# Patient Record
Sex: MALE | Race: WHITE | NOT HISPANIC OR LATINO | Employment: PART TIME | URBAN - METROPOLITAN AREA
[De-identification: names, ages, dates, MRNs, and addresses within clinical notes are randomized per-mention and may not be internally consistent; named-entity substitution may affect disease eponyms.]

---

## 2017-01-28 ENCOUNTER — APPOINTMENT (EMERGENCY)
Dept: RADIOLOGY | Facility: HOSPITAL | Age: 22
End: 2017-01-28
Payer: COMMERCIAL

## 2017-01-28 ENCOUNTER — GENERIC CONVERSION - ENCOUNTER (OUTPATIENT)
Dept: OTHER | Facility: OTHER | Age: 22
End: 2017-01-28

## 2017-01-28 ENCOUNTER — HOSPITAL ENCOUNTER (EMERGENCY)
Facility: HOSPITAL | Age: 22
Discharge: HOME | End: 2017-01-28
Attending: EMERGENCY MEDICINE
Payer: COMMERCIAL

## 2017-01-28 VITALS
HEART RATE: 78 BPM | WEIGHT: 129.8 LBS | SYSTOLIC BLOOD PRESSURE: 122 MMHG | RESPIRATION RATE: 18 BRPM | DIASTOLIC BLOOD PRESSURE: 64 MMHG | OXYGEN SATURATION: 100 % | TEMPERATURE: 98.1 F

## 2017-01-28 DIAGNOSIS — R00.2 INTERMITTENT PALPITATIONS: ICD-10-CM

## 2017-01-28 DIAGNOSIS — R00.2 HEART PALPITATIONS: Primary | ICD-10-CM

## 2017-01-28 LAB
ANION GAP SERPL CALCULATED.3IONS-SCNC: 8 MMOL/L (ref 4–13)
APTT PPP: 26 SECONDS (ref 24–36)
ATRIAL RATE: 118 BPM
BASOPHILS # BLD AUTO: 0.01 THOUSANDS/ΜL (ref 0–0.1)
BASOPHILS NFR BLD AUTO: 0 % (ref 0–1)
BUN SERPL-MCNC: 21 MG/DL (ref 5–25)
CALCIUM SERPL-MCNC: 9.2 MG/DL (ref 8.3–10.1)
CHLORIDE SERPL-SCNC: 106 MMOL/L (ref 100–108)
CO2 SERPL-SCNC: 28 MMOL/L (ref 21–32)
CREAT SERPL-MCNC: 1.11 MG/DL (ref 0.6–1.3)
DEPRECATED D DIMER PPP: <220 NG/ML (FEU) (ref 0–424)
EOSINOPHIL # BLD AUTO: 0.03 THOUSAND/ΜL (ref 0–0.61)
EOSINOPHIL NFR BLD AUTO: 1 % (ref 0–6)
ERYTHROCYTE [DISTWIDTH] IN BLOOD BY AUTOMATED COUNT: 13 % (ref 11.6–15.1)
GFR SERPL CREATININE-BSD FRML MDRD: >60 ML/MIN/1.73SQ M
GLUCOSE SERPL-MCNC: 125 MG/DL (ref 65–140)
HCT VFR BLD AUTO: 45.3 % (ref 36.5–49.3)
HGB BLD-MCNC: 15.9 G/DL (ref 12–17)
HOLD SPECIMEN: NORMAL
INR PPP: 0.96 (ref 0.86–1.16)
LYMPHOCYTES # BLD AUTO: 1.16 THOUSANDS/ΜL (ref 0.6–4.47)
LYMPHOCYTES NFR BLD AUTO: 28 % (ref 14–44)
MAGNESIUM SERPL-MCNC: 2.1 MG/DL (ref 1.6–2.6)
MCH RBC QN AUTO: 31 PG (ref 26.8–34.3)
MCHC RBC AUTO-ENTMCNC: 35.1 G/DL (ref 31.4–37.4)
MCV RBC AUTO: 88 FL (ref 82–98)
MONOCYTES # BLD AUTO: 0.33 THOUSAND/ΜL (ref 0.17–1.22)
MONOCYTES NFR BLD AUTO: 8 % (ref 4–12)
NEUTROPHILS # BLD AUTO: 2.57 THOUSANDS/ΜL (ref 1.85–7.62)
NEUTS SEG NFR BLD AUTO: 63 % (ref 43–75)
NRBC BLD AUTO-RTO: 0 /100 WBCS
P AXIS: 71 DEGREES
PLATELET # BLD AUTO: 156 THOUSANDS/UL (ref 149–390)
PMV BLD AUTO: 10.3 FL (ref 8.9–12.7)
POTASSIUM SERPL-SCNC: 3.9 MMOL/L (ref 3.5–5.3)
PR INTERVAL: 150 MS
PROTHROMBIN TIME: 12.9 SECONDS (ref 12–14.3)
QRS AXIS: 104 DEGREES
QRSD INTERVAL: 80 MS
QT INTERVAL: 296 MS
QTC INTERVAL: 414 MS
RBC # BLD AUTO: 5.13 MILLION/UL (ref 3.88–5.62)
SODIUM SERPL-SCNC: 142 MMOL/L (ref 136–145)
SPECIMEN SOURCE: NORMAL
T WAVE AXIS: 67 DEGREES
TROPONIN I BLD-MCNC: 0 NG/ML (ref 0–0.08)
TSH SERPL DL<=0.05 MIU/L-ACNC: 1.92 UIU/ML (ref 0.36–3.74)
VENTRICULAR RATE: 118 BPM
WBC # BLD AUTO: 4.1 THOUSAND/UL (ref 4.31–10.16)

## 2017-01-28 PROCEDURE — 85610 PROTHROMBIN TIME: CPT | Performed by: EMERGENCY MEDICINE

## 2017-01-28 PROCEDURE — 71020 HB CHEST X-RAY 2VW FRONTAL&LATL: CPT

## 2017-01-28 PROCEDURE — 83735 ASSAY OF MAGNESIUM: CPT | Performed by: EMERGENCY MEDICINE

## 2017-01-28 PROCEDURE — 93005 ELECTROCARDIOGRAM TRACING: CPT | Performed by: EMERGENCY MEDICINE

## 2017-01-28 PROCEDURE — 85025 COMPLETE CBC W/AUTO DIFF WBC: CPT | Performed by: EMERGENCY MEDICINE

## 2017-01-28 PROCEDURE — 84484 ASSAY OF TROPONIN QUANT: CPT

## 2017-01-28 PROCEDURE — 36415 COLL VENOUS BLD VENIPUNCTURE: CPT | Performed by: EMERGENCY MEDICINE

## 2017-01-28 PROCEDURE — 80048 BASIC METABOLIC PNL TOTAL CA: CPT | Performed by: EMERGENCY MEDICINE

## 2017-01-28 PROCEDURE — 85379 FIBRIN DEGRADATION QUANT: CPT | Performed by: EMERGENCY MEDICINE

## 2017-01-28 PROCEDURE — 84443 ASSAY THYROID STIM HORMONE: CPT | Performed by: EMERGENCY MEDICINE

## 2017-01-28 PROCEDURE — 96361 HYDRATE IV INFUSION ADD-ON: CPT

## 2017-01-28 PROCEDURE — 99285 EMERGENCY DEPT VISIT HI MDM: CPT

## 2017-01-28 PROCEDURE — 85730 THROMBOPLASTIN TIME PARTIAL: CPT | Performed by: EMERGENCY MEDICINE

## 2017-01-28 PROCEDURE — 96360 HYDRATION IV INFUSION INIT: CPT

## 2017-01-28 RX ADMIN — SODIUM CHLORIDE 1000 ML: 0.9 INJECTION, SOLUTION INTRAVENOUS at 10:33

## 2017-02-13 ENCOUNTER — ALLSCRIPTS OFFICE VISIT (OUTPATIENT)
Dept: OTHER | Facility: OTHER | Age: 22
End: 2017-02-13

## 2017-02-13 DIAGNOSIS — R00.2 PALPITATIONS: ICD-10-CM

## 2017-02-13 DIAGNOSIS — R94.01 ABNORMAL ELECTROENCEPHALOGRAM: ICD-10-CM

## 2017-02-13 DIAGNOSIS — M54.2 CERVICALGIA: ICD-10-CM

## 2017-02-16 ENCOUNTER — TRANSCRIBE ORDERS (OUTPATIENT)
Dept: ADMINISTRATIVE | Facility: HOSPITAL | Age: 22
End: 2017-02-16

## 2017-02-16 ENCOUNTER — APPOINTMENT (OUTPATIENT)
Dept: LAB | Facility: HOSPITAL | Age: 22
End: 2017-02-16
Attending: INTERNAL MEDICINE
Payer: COMMERCIAL

## 2017-02-16 DIAGNOSIS — R00.2 PALPITATIONS: ICD-10-CM

## 2017-02-16 DIAGNOSIS — M54.2 CERVICALGIA: ICD-10-CM

## 2017-02-16 LAB
T4 FREE SERPL-MCNC: 0.91 NG/DL (ref 0.76–1.46)
TSH SERPL DL<=0.05 MIU/L-ACNC: 1.61 UIU/ML (ref 0.36–3.74)

## 2017-02-16 PROCEDURE — 84443 ASSAY THYROID STIM HORMONE: CPT

## 2017-02-16 PROCEDURE — 36415 COLL VENOUS BLD VENIPUNCTURE: CPT

## 2017-02-16 PROCEDURE — 84439 ASSAY OF FREE THYROXINE: CPT

## 2017-02-16 PROCEDURE — 84479 ASSAY OF THYROID (T3 OR T4): CPT

## 2017-02-17 LAB — T3RU NFR SERPL: 31 % (ref 24–39)

## 2017-02-22 ENCOUNTER — GENERIC CONVERSION - ENCOUNTER (OUTPATIENT)
Dept: OTHER | Facility: OTHER | Age: 22
End: 2017-02-22

## 2017-02-24 ENCOUNTER — HOSPITAL ENCOUNTER (OUTPATIENT)
Dept: NON INVASIVE DIAGNOSTICS | Facility: HOSPITAL | Age: 22
Discharge: HOME | End: 2017-02-24
Attending: INTERNAL MEDICINE
Payer: COMMERCIAL

## 2017-02-24 ENCOUNTER — HOSPITAL ENCOUNTER (OUTPATIENT)
Dept: ULTRASOUND IMAGING | Facility: HOSPITAL | Age: 22
Discharge: HOME | End: 2017-02-24
Attending: INTERNAL MEDICINE
Payer: COMMERCIAL

## 2017-02-24 DIAGNOSIS — R00.2 PALPITATIONS: ICD-10-CM

## 2017-02-24 DIAGNOSIS — M54.2 CERVICALGIA: ICD-10-CM

## 2017-02-24 PROCEDURE — 93306 TTE W/DOPPLER COMPLETE: CPT

## 2017-02-24 PROCEDURE — 76536 US EXAM OF HEAD AND NECK: CPT

## 2017-02-24 PROCEDURE — 93225 XTRNL ECG REC<48 HRS REC: CPT

## 2017-02-24 PROCEDURE — 93226 XTRNL ECG REC<48 HR SCAN A/R: CPT

## 2017-02-27 ENCOUNTER — GENERIC CONVERSION - ENCOUNTER (OUTPATIENT)
Dept: OTHER | Facility: OTHER | Age: 22
End: 2017-02-27

## 2017-03-09 ENCOUNTER — ALLSCRIPTS OFFICE VISIT (OUTPATIENT)
Dept: OTHER | Facility: OTHER | Age: 22
End: 2017-03-09

## 2017-03-15 ENCOUNTER — TRANSCRIBE ORDERS (OUTPATIENT)
Dept: ADMINISTRATIVE | Facility: HOSPITAL | Age: 22
End: 2017-03-15

## 2017-03-15 ENCOUNTER — ALLSCRIPTS OFFICE VISIT (OUTPATIENT)
Dept: OTHER | Facility: OTHER | Age: 22
End: 2017-03-15

## 2017-03-27 ENCOUNTER — HOSPITAL ENCOUNTER (OUTPATIENT)
Dept: NON INVASIVE DIAGNOSTICS | Facility: CLINIC | Age: 22
Discharge: HOME | End: 2017-03-27
Payer: COMMERCIAL

## 2017-03-27 DIAGNOSIS — R94.01 ABNORMAL ELECTROENCEPHALOGRAM: ICD-10-CM

## 2017-03-27 PROCEDURE — 93017 CV STRESS TEST TRACING ONLY: CPT

## 2017-03-28 ENCOUNTER — HOSPITAL ENCOUNTER (OUTPATIENT)
Dept: RADIOLOGY | Facility: HOSPITAL | Age: 22
Discharge: HOME | End: 2017-03-28
Attending: INTERNAL MEDICINE
Payer: COMMERCIAL

## 2017-03-28 ENCOUNTER — TRANSCRIBE ORDERS (OUTPATIENT)
Dept: ADMINISTRATIVE | Facility: HOSPITAL | Age: 22
End: 2017-03-28

## 2017-03-28 ENCOUNTER — ALLSCRIPTS OFFICE VISIT (OUTPATIENT)
Dept: OTHER | Facility: OTHER | Age: 22
End: 2017-03-28

## 2017-03-28 DIAGNOSIS — M54.2 CERVICALGIA: ICD-10-CM

## 2017-03-28 DIAGNOSIS — S06.0X9A CONCUSSION WITH LOSS OF CONSCIOUSNESS: ICD-10-CM

## 2017-03-28 PROCEDURE — 70360 X-RAY EXAM OF NECK: CPT

## 2017-03-28 PROCEDURE — 70110 X-RAY EXAM OF JAW 4/> VIEWS: CPT

## 2017-03-28 PROCEDURE — 72052 X-RAY EXAM NECK SPINE 6/>VWS: CPT

## 2017-03-29 ENCOUNTER — GENERIC CONVERSION - ENCOUNTER (OUTPATIENT)
Dept: OTHER | Facility: OTHER | Age: 22
End: 2017-03-29

## 2017-03-30 LAB
CHEST PAIN STATEMENT: NORMAL
MAX DIASTOLIC BP: 62 MMHG
MAX HEART RATE: 193 BPM
MAX PREDICTED HEART RATE: 198 BPM
MAX. SYSTOLIC BP: 141 MMHG
PROTOCOL NAME: NORMAL
REASON FOR TERMINATION: NORMAL
TARGET HR FORMULA: NORMAL
TEST INDICATION: NORMAL
TIME IN EXERCISE PHASE: 950 S

## 2017-04-04 ENCOUNTER — HOSPITAL ENCOUNTER (OUTPATIENT)
Dept: CT IMAGING | Facility: HOSPITAL | Age: 22
Discharge: HOME | End: 2017-04-04
Attending: INTERNAL MEDICINE
Payer: COMMERCIAL

## 2017-04-04 DIAGNOSIS — S06.0X9A CONCUSSION WITH LOSS OF CONSCIOUSNESS: ICD-10-CM

## 2017-04-04 PROCEDURE — 70450 CT HEAD/BRAIN W/O DYE: CPT

## 2017-04-05 ENCOUNTER — GENERIC CONVERSION - ENCOUNTER (OUTPATIENT)
Dept: OTHER | Facility: OTHER | Age: 22
End: 2017-04-05

## 2017-05-02 DIAGNOSIS — S06.0X9A CONCUSSION WITH LOSS OF CONSCIOUSNESS: ICD-10-CM

## 2017-05-03 ENCOUNTER — APPOINTMENT (OUTPATIENT)
Dept: PHYSICAL THERAPY | Facility: CLINIC | Age: 22
End: 2017-05-03
Payer: COMMERCIAL

## 2017-05-03 DIAGNOSIS — S06.0X9A CONCUSSION WITH LOSS OF CONSCIOUSNESS: ICD-10-CM

## 2017-05-03 PROCEDURE — 97162 PT EVAL MOD COMPLEX 30 MIN: CPT

## 2017-05-08 ENCOUNTER — APPOINTMENT (OUTPATIENT)
Dept: PHYSICAL THERAPY | Facility: CLINIC | Age: 22
End: 2017-05-08
Payer: COMMERCIAL

## 2017-05-08 PROCEDURE — 97014 ELECTRIC STIMULATION THERAPY: CPT

## 2017-05-08 PROCEDURE — 97140 MANUAL THERAPY 1/> REGIONS: CPT

## 2017-05-08 PROCEDURE — 97110 THERAPEUTIC EXERCISES: CPT

## 2017-05-11 ENCOUNTER — APPOINTMENT (OUTPATIENT)
Dept: PHYSICAL THERAPY | Facility: CLINIC | Age: 22
End: 2017-05-11
Payer: COMMERCIAL

## 2017-05-11 PROCEDURE — 97110 THERAPEUTIC EXERCISES: CPT

## 2017-05-11 PROCEDURE — 97140 MANUAL THERAPY 1/> REGIONS: CPT

## 2017-05-11 PROCEDURE — 97014 ELECTRIC STIMULATION THERAPY: CPT

## 2017-05-15 ENCOUNTER — APPOINTMENT (OUTPATIENT)
Dept: PHYSICAL THERAPY | Facility: CLINIC | Age: 22
End: 2017-05-15
Payer: COMMERCIAL

## 2017-05-15 PROCEDURE — 97140 MANUAL THERAPY 1/> REGIONS: CPT

## 2017-05-15 PROCEDURE — 97014 ELECTRIC STIMULATION THERAPY: CPT

## 2017-05-16 ENCOUNTER — GENERIC CONVERSION - ENCOUNTER (OUTPATIENT)
Dept: OTHER | Facility: OTHER | Age: 22
End: 2017-05-16

## 2017-05-16 ENCOUNTER — APPOINTMENT (OUTPATIENT)
Dept: OCCUPATIONAL MEDICINE | Facility: CLINIC | Age: 22
End: 2017-05-16
Payer: OTHER MISCELLANEOUS

## 2017-05-16 PROCEDURE — 99203 OFFICE O/P NEW LOW 30 MIN: CPT

## 2017-05-18 ENCOUNTER — APPOINTMENT (OUTPATIENT)
Dept: PHYSICAL THERAPY | Facility: CLINIC | Age: 22
End: 2017-05-18
Payer: COMMERCIAL

## 2017-05-18 PROCEDURE — 97110 THERAPEUTIC EXERCISES: CPT

## 2017-05-18 PROCEDURE — 97140 MANUAL THERAPY 1/> REGIONS: CPT

## 2017-05-22 ENCOUNTER — APPOINTMENT (OUTPATIENT)
Dept: PHYSICAL THERAPY | Facility: CLINIC | Age: 22
End: 2017-05-22
Payer: COMMERCIAL

## 2017-05-22 PROCEDURE — 97140 MANUAL THERAPY 1/> REGIONS: CPT

## 2017-05-22 PROCEDURE — 97110 THERAPEUTIC EXERCISES: CPT

## 2017-05-24 ENCOUNTER — APPOINTMENT (OUTPATIENT)
Dept: PHYSICAL THERAPY | Facility: CLINIC | Age: 22
End: 2017-05-24
Payer: COMMERCIAL

## 2017-05-24 PROCEDURE — 97012 MECHANICAL TRACTION THERAPY: CPT

## 2017-05-24 PROCEDURE — 97140 MANUAL THERAPY 1/> REGIONS: CPT

## 2017-05-30 ENCOUNTER — APPOINTMENT (OUTPATIENT)
Dept: PHYSICAL THERAPY | Facility: CLINIC | Age: 22
End: 2017-05-30
Payer: COMMERCIAL

## 2017-05-30 PROCEDURE — 97110 THERAPEUTIC EXERCISES: CPT

## 2017-05-30 PROCEDURE — 97140 MANUAL THERAPY 1/> REGIONS: CPT

## 2017-05-31 ENCOUNTER — APPOINTMENT (OUTPATIENT)
Dept: PHYSICAL THERAPY | Facility: CLINIC | Age: 22
End: 2017-05-31
Payer: COMMERCIAL

## 2017-05-31 PROCEDURE — 97140 MANUAL THERAPY 1/> REGIONS: CPT

## 2017-05-31 PROCEDURE — 97010 HOT OR COLD PACKS THERAPY: CPT

## 2017-06-26 ENCOUNTER — GENERIC CONVERSION - ENCOUNTER (OUTPATIENT)
Dept: OTHER | Facility: OTHER | Age: 22
End: 2017-06-26

## 2017-07-31 ENCOUNTER — LAB CONVERSION - ENCOUNTER (OUTPATIENT)
Dept: OTHER | Facility: OTHER | Age: 22
End: 2017-07-31

## 2017-08-01 LAB
A/G RATIO (HISTORICAL): 2.1 (CALC) (ref 1–2.5)
ALBUMIN SERPL BCP-MCNC: 5 G/DL (ref 3.6–5.1)
ALP SERPL-CCNC: 63 U/L (ref 40–115)
AST SERPL W P-5'-P-CCNC: 18 U/L (ref 10–40)
BILIRUB SERPL-MCNC: 0.6 MG/DL (ref 0.2–1.2)
BUN SERPL-MCNC: 18 MG/DL (ref 7–25)
BUN/CREA RATIO (HISTORICAL): NORMAL (CALC) (ref 6–22)
CALCIUM SERPL-MCNC: 9.8 MG/DL (ref 8.6–10.3)
CHLORIDE SERPL-SCNC: 102 MMOL/L (ref 98–110)
CO2 SERPL-SCNC: 30 MMOL/L (ref 20–31)
CREAT SERPL-MCNC: 1.24 MG/DL (ref 0.6–1.35)
DEPRECATED RDW RBC AUTO: 12.3 % (ref 11–15)
EGFR AFRICAN AMERICAN (HISTORICAL): 95 ML/MIN/1.73M2
EGFR-AMERICAN CALC (HISTORICAL): 82 ML/MIN/1.73M2
GAMMA GLOBULIN (HISTORICAL): 2.4 G/DL (CALC) (ref 1.9–3.7)
GLUCOSE (HISTORICAL): 87 MG/DL (ref 65–99)
HCT VFR BLD AUTO: 47.4 % (ref 38.5–50)
HGB BLD-MCNC: 15.8 G/DL (ref 13.2–17.1)
LYME 18 KD IGG (HISTORICAL): REACTIVE
LYME 23 KD IGG (HISTORICAL): ABNORMAL
LYME 28 KD IGG (HISTORICAL): REACTIVE
LYME 30 KD IGG (HISTORICAL): REACTIVE
LYME 39 KD IGG (HISTORICAL): REACTIVE
LYME 41 KD IGG (HISTORICAL): REACTIVE
LYME 45 KD IGG (HISTORICAL): REACTIVE
LYME 58 KD IGG (HISTORICAL): REACTIVE
LYME 66 KD IGG (HISTORICAL): REACTIVE
LYME 93 KD IGG (HISTORICAL): REACTIVE
LYME IGG (HISTORICAL): POSITIVE
MCH RBC QN AUTO: 30 PG (ref 27–33)
MCHC RBC AUTO-ENTMCNC: 33.3 G/DL (ref 32–36)
MCV RBC AUTO: 90.1 FL (ref 80–100)
PLATELET # BLD AUTO: 186 THOUSAND/UL (ref 140–400)
PMV BLD AUTO: 10 FL (ref 7.5–12.5)
POTASSIUM SERPL-SCNC: 4.2 MMOL/L (ref 3.5–5.3)
RBC # BLD AUTO: 5.26 MILLION/UL (ref 4.2–5.8)
SODIUM SERPL-SCNC: 139 MMOL/L (ref 135–146)
TOTAL PROTEIN (HISTORICAL): 7.4 G/DL (ref 6.1–8.1)
TSH SERPL DL<=0.05 MIU/L-ACNC: 1.47 MIU/L (ref 0.4–4.5)
WBC # BLD AUTO: 7.1 THOUSAND/UL (ref 3.8–10.8)

## 2017-08-09 ENCOUNTER — ALLSCRIPTS OFFICE VISIT (OUTPATIENT)
Dept: OTHER | Facility: OTHER | Age: 22
End: 2017-08-09

## 2017-08-21 ENCOUNTER — ALLSCRIPTS OFFICE VISIT (OUTPATIENT)
Dept: OTHER | Facility: OTHER | Age: 22
End: 2017-08-21

## 2017-08-21 DIAGNOSIS — R59.0 LOCALIZED ENLARGED LYMPH NODES: ICD-10-CM

## 2017-08-22 ENCOUNTER — GENERIC CONVERSION - ENCOUNTER (OUTPATIENT)
Dept: OTHER | Facility: OTHER | Age: 22
End: 2017-08-22

## 2017-08-23 ENCOUNTER — GENERIC CONVERSION - ENCOUNTER (OUTPATIENT)
Dept: OTHER | Facility: OTHER | Age: 22
End: 2017-08-23

## 2017-08-24 ENCOUNTER — GENERIC CONVERSION - ENCOUNTER (OUTPATIENT)
Dept: OTHER | Facility: OTHER | Age: 22
End: 2017-08-24

## 2017-08-25 ENCOUNTER — HOSPITAL ENCOUNTER (OUTPATIENT)
Dept: CT IMAGING | Facility: HOSPITAL | Age: 22
Discharge: HOME | End: 2017-08-25
Attending: INTERNAL MEDICINE
Payer: COMMERCIAL

## 2017-08-25 DIAGNOSIS — R59.0 LOCALIZED ENLARGED LYMPH NODES: ICD-10-CM

## 2017-08-25 PROCEDURE — 70491 CT SOFT TISSUE NECK W/DYE: CPT

## 2017-08-25 RX ADMIN — IOHEXOL 85 ML: 350 INJECTION, SOLUTION INTRAVENOUS at 19:20

## 2017-08-28 ENCOUNTER — GENERIC CONVERSION - ENCOUNTER (OUTPATIENT)
Dept: OTHER | Facility: OTHER | Age: 22
End: 2017-08-28

## 2017-09-06 ENCOUNTER — ALLSCRIPTS OFFICE VISIT (OUTPATIENT)
Dept: OTHER | Facility: OTHER | Age: 22
End: 2017-09-06

## 2017-09-11 ENCOUNTER — ALLSCRIPTS OFFICE VISIT (OUTPATIENT)
Dept: OTHER | Facility: OTHER | Age: 22
End: 2017-09-11

## 2017-09-19 ENCOUNTER — GENERIC CONVERSION - ENCOUNTER (OUTPATIENT)
Dept: OTHER | Facility: OTHER | Age: 22
End: 2017-09-19

## 2017-10-23 ENCOUNTER — APPOINTMENT (OUTPATIENT)
Dept: LAB | Facility: CLINIC | Age: 22
End: 2017-10-23
Payer: COMMERCIAL

## 2017-10-23 ENCOUNTER — ALLSCRIPTS OFFICE VISIT (OUTPATIENT)
Dept: OTHER | Facility: OTHER | Age: 22
End: 2017-10-23

## 2017-10-23 DIAGNOSIS — A69.20 LYME DISEASE: ICD-10-CM

## 2017-10-23 DIAGNOSIS — M54.2 CERVICALGIA: ICD-10-CM

## 2017-10-23 DIAGNOSIS — H02.409 PTOSIS OF EYELID: ICD-10-CM

## 2017-10-23 PROCEDURE — 86617 LYME DISEASE ANTIBODY: CPT

## 2017-10-23 PROCEDURE — 86618 LYME DISEASE ANTIBODY: CPT

## 2017-10-23 PROCEDURE — 36415 COLL VENOUS BLD VENIPUNCTURE: CPT

## 2017-10-24 LAB
B BURGDOR IGG SER IA-ACNC: 6.01
B BURGDOR IGM SER IA-ACNC: 1.14

## 2017-10-25 LAB
B BURGDOR IGG PATRN SER IB-IMP: POSITIVE
B BURGDOR IGM PATRN SER IB-IMP: NEGATIVE
B BURGDOR18KD IGG SER QL IB: PRESENT
B BURGDOR23KD IGG SER QL IB: PRESENT
B BURGDOR23KD IGM SER QL IB: PRESENT
B BURGDOR28KD IGG SER QL IB: PRESENT
B BURGDOR30KD IGG SER QL IB: PRESENT
B BURGDOR39KD IGG SER QL IB: PRESENT
B BURGDOR39KD IGM SER QL IB: ABNORMAL
B BURGDOR41KD IGG SER QL IB: PRESENT
B BURGDOR41KD IGM SER QL IB: ABNORMAL
B BURGDOR45KD IGG SER QL IB: PRESENT
B BURGDOR58KD IGG SER QL IB: PRESENT
B BURGDOR66KD IGG SER QL IB: PRESENT
B BURGDOR93KD IGG SER QL IB: PRESENT

## 2017-10-26 ENCOUNTER — TRANSCRIBE ORDERS (OUTPATIENT)
Dept: ADMINISTRATIVE | Facility: HOSPITAL | Age: 22
End: 2017-10-26

## 2017-10-26 ENCOUNTER — GENERIC CONVERSION - ENCOUNTER (OUTPATIENT)
Dept: OTHER | Facility: OTHER | Age: 22
End: 2017-10-26

## 2017-10-26 DIAGNOSIS — M54.2 CERVICALGIA: Primary | ICD-10-CM

## 2017-10-26 DIAGNOSIS — H02.409 PTOSIS OF EYELID, UNSPECIFIED LATERALITY: ICD-10-CM

## 2017-10-26 NOTE — CONSULTS
Assessment  1  Occipital neuralgia of right side (723 8) (M54 81)   2  Allergic rhinitis, unspecified chronicity, unspecified seasonality, unspecified trigger   (477 9) (J30 9)   3  Encounter for preventive health examination (V70 0) (Z00 00)   4  Headache disorder (784 0) (R51)   5  Heart palpitations (785 1) (R00 2)   6  Neck pain on left side (723 1) (M54 2)    Plan  Headache disorder, Lyme disease, Occipital neuralgia of right side    · Follow-up visit in 2 months Evaluation and Treatment  Follow-up  Status: Complete   Done: 2017   Ordered; For: Headache disorder, Lyme disease, Occipital neuralgia of right side; Ordered By: Mauro Veliz Performed:  Due: 13TPX7929; Last Updated By: Emily Gage; 10/23/2017 4:03:05 PM  Headache disorder, Occipital neuralgia of right side    · Diclofenac Sodium 50 MG Oral Tablet Delayed Release; Take 1 tab at headache  onset, can repeat q8h TID  Max 3 days/week   Rx By: Mauro Veliz; Dispense: 0 Days ; #:30 Tablet Delayed Release; Refill: 2;For: Headache disorder, Occipital neuralgia of right side; MY = N; F[facility]ed To: [id number]  Lyme disease    · (1) LYME ANTIBODY PROFILE W/REFLEX TO WESTERN BLOT; Status:Resulted -  Requires Verification;   Done: 68RJQ3839 04:10PM   [id number]; Ordered; For:Lyme disease; Ordered By:Hailey Hendricks;  Occipital neuralgia of right side    · TiZANidine HCl - 4 MG Oral Tablet; Take 1 tab nightly x 5 day, then 1 5 tabs nightly   Rx By: Mauro Veliz; Dispense: 0 Days ; #:45 Tablet;  Refill: 4;For: Occipital neuralgia of right side; MY = N; [facility]axed To: [id number]    Discussion/Summary  Discussion Summary:   Occipital neuralgiform cephalgia and left sided neck pain since injury with non focal neurologic examAbortive treatment: DiclofenacPreventative: TizanidineWith his severe neck pain I would like to rule out a potential dissection- he has had MRI And MRA done at an outside network and does not know results not has it with him  Will obtain these, if needed after review, will order further neuroimaging  radicular pain/ dysesthesias- intermittentWill follow up neuroimaging after I obtain his results from outside network  recent significantly abnormal Lyme disease blood work s/p doxycycline per PCPWill obtain repeat Lyme testing    Counseling Documentation With Imm: The patient was counseled regarding  Medication SE Review and Pt Understands Tx: Possible side effects of new medications were reviewed with the patient/guardian today  The treatment plan was reviewed with the patient/guardian  The patient/guardian understands and agrees with the treatment plan   Headache [facility]:   The patient was counseled regarding;   Discussed side effects of all medications prescribed today to the patient in detail  Patient education was completed today and we also discussed precautions for rebound headaches  --    When patient has a moderate to severe headache, they should seek rest, initiate relaxation and apply cold compresses to the head  Also recommended to the patient :  1  Maintain regular sleep schedule -- 2  Limit over the counter medications  (No more than 3 times a week)  -- 3  Maintain headache diary  -- 4  Limit caffeine to 1-2 cups a day or less  -- 5  Avoid dietary trigger  (list given to the patient and reviewed with them)  -- 6  Patient is to have regular frequent meals to prevent headache onset  Stroke Patient Family Education [facility]:   Patient/Family was also educated regarding: Signs And Symptoms Of Stroke/Call 911  Chief Complaint  Chief Complaint Free Text Note Form: Patient presents for a consultation for sharp pain on right side of the head  History of Present Illness  HPI: Mr Rachid Johnson is a 25 yo male, seen in consultation for left neck pain and right stabbing head pain  States constant since January 2017  me this started after a work injury where he was injured lifting heavy lumber material, states the 16 foot pipes hit his neck with force and he had swelling which lasted for a week, states he was okay for a month and then started having severe neck pain and right side (opposite to his left neck injury and left neck pain) stabbing head pain radiating to the vertex  States constant since then  States has not been pain free  activity or bending down worsens the severity  no photophobia- unless it is extremely bright light such as sunlight or head lights  States no nausea or vomiting  at times feels that his right eye is droopy- noted by friends and family also, usually with worse headache  Denies lacrimation, rhinorrhea, facial flushing  cognitive issuessince this neck injury in January he has numbness and tingling lasting for 10-20 mins (not correlated with activity) in his left UE, rarely on the right higher heart rate secondary to this  States cardiology saw him and said no significant abnormalities  other concussion about seven years ago, where he was hit when he was paint-ballingtell me occasional light headedness after the injury (prior to metoprolol being started ), with near syncopal events with bending down, standing up, but states no syncopal events  If he sits down this sensation resolves  family history of paternal grandfather with stroke  family history migraines/headaches  family history (or personal history) intracranial bleeds or aneurysms  seizures      Neurology [facility]:   Headache: On a scale of 0-10, the pain severity is a 6  the headaches started at age 23  He experienced the following accidents/injury prior to onset of headaches: neck injury with heavy lumber material   Headaches are occurring daily  headaches are continuously present  Currently the pain is on the entire right side-- and-- in the occipital region  Warning(s) prior to headache include none  Usual headache is described as shooting and stabbing  Associated symptoms include with darkness-- and-- ptosis (right)   states cannot be alone in a quiet dark room due to his work, but-- no photophobia,-- no phonophobia,-- no tinnitus,-- no lacrimation,-- no sensitivity to smell,-- no flushing,-- no blurred vision,-- no loss of appetite,-- no nausea,-- no vomiting-- and-- no runny or stuffed up nose  Headaches are made worse by coughing,-- running or exercising-- and-- bending over, but-- not while sneezing,-- not while moving bowel,-- not while walking-- and-- not while climbing stairs  Headaches are triggered by not by fatigue,-- not stress/tension,-- not by changes in the weather,-- not eating certain foods,-- not with certain medication,-- not by coughing,-- not while oversleeping-- and-- not when lying down  Review of Systems  Neurological ROS:   Constitutional: no fever, no chills, no recent weight gain, no recent weight loss, no complaints of feeling tired, no changes in appetite  HEENT: as noted in HPI  Cardiovascular: as noted in HPI  Respiratory: as noted in HPI  Hematologic/Lymphatic:  no unusual bleeding, no tendency for easy bruising, no clotting skin or lumps  Neurological General: as noted in HPI  Neurological Mental Status: as noted in HPI  Neurological Cranial Nerves: as noted in HPI  Neurological Motor findings include: as noted in HPI  Neurological Coordination: as noted in HPI  Neurological Sensory: as noted in HPI  Neurological Gait: as noted in HPI  ROS Reviewed:   ROS reviewed  Active Problems  1  Allergic rhinitis, unspecified chronicity, unspecified seasonality, unspecified trigger   (477 9) (J30 9)   2  Cervical lymphadenopathy (785 6) (R59 0)   3  Headache disorder (784 0) (R51)   4  Heart palpitations (785 1) (R00 2)   5  Denied: History of Mental health disorder   6  Neck pain on left side (723 1) (M54 2)   7  No advance directives (V49 89) (Z78 9)   8  Occipital neuralgia of right side (723 8) (M54 81)   9  Denied: History of Substance abuse    Past Medical History  1   History of concussion (V15 52) (Z99 820)   2  Denied: History of Mental health disorder   3  Denied: History of Substance abuse  Active Problems And Past Medical History Reviewed: The active problems and past medical history were reviewed and updated today  Family History  Mother    1  No family history of mental disorder   2  Denied: Family history of Substance abuse in family  Father    3  No family history of mental disorder   4  Denied: Family history of Substance abuse in family  Family History Reviewed: The family history was reviewed and updated today  Social History   · Dental care, regularly   · Former smoker (V36 56) (P80 404)   · Lives with family   · Living Situation: Supportive and safe   · No advance directives (V49 89) (Z78 9)  Social History Reviewed: The social history was reviewed and updated today  Current Meds   1  Fluticasone Propionate 50 MCG/ACT Nasal Suspension; use 2 sprays in each nostril   once daily; Therapy: 27Wsg6283 to (Evaluate:06Sep2018)  Requested for: 25Bls1100; Last   Rx:99Hsq7017 Ordered   2  Metoprolol Succinate ER 25 MG Oral Tablet Extended Release 24 Hour; take 1 tablet by   mouth every day; Therapy: 20Sei5106 to (Evaluate:19Mar2018)  Requested for: 61Nbe2580; Last   Rx:42Pcg8291 Ordered  Medication List Reviewed: The medication list was reviewed and updated today  Allergies  1  No Known Drug Allergies    Vitals  Signs   Recorded: 16HCV4308 03:15PM   Heart Rate: 80  Respiration: 14  Systolic: 753  Diastolic: 70  Height: 5 ft 6 in  Weight: 135 lb   BMI Calculated: 21 79  BSA Calculated: 1 69  O2 Saturation: 97    Physical Exam    Constitutional   General Appearance: Appears appropriate for age, healthy, well developed, appropriately groomed and appropriately dressed    Eyes   Ophthalmoscopic examination: Vision is grossly normal  Gross visual field testing by confrontation shows no abnormalities  EOMI in both eyes  Conjunctivae clear   Eyelids normal palpebral fissures equal  Orbits exhibit normal position  No discharge from the eyes  PERRL  External inspection of ears and nose: Normal       Neck   Neck and thyroid: Abnormal  -- left neck pain and right occipital notch tenderness to mild palpation  Cardiovascular   Auscultation of heart: Rate is regular  Rhythm is regular  Peripheral vascular exam: Normal pulses throughout, no tenderness, erythema or swelling  Musculoskeletal   Gait and Station: Walks with normal gait  Tandem walk test is normal  Romberg's test is negative  Muscle strength: Normal strength throughout  Muscle tone: No atrophy, abnormal movements, flaccidity, cogwheeling or spasticity  Range of motion: Normal     Stability: Normal     Inspection of temporomandibular joint appears normal     Neurologic   Orientation to person, place, and time: Normal     Attention span and concentration: Normal thought process and attention span  Language: Names objects, able to repeat phrases and speaks spontaneously  Fund of knowledge: Normal vocabulary with appropriate knowledge of current events and past history  Sensation: Intact sensation to pinprick, temperature, vibration, and proprioception in all four extremities  Reflexes: DTR's are normal and symmetric bilaterally  Babinski's reflex is negative bilaterally  No pathologic ankle clonus  Coordination: Cerebellum function intact  No involuntary movement or psychomotor activity  Motor System: No pronator drift  Upper Extremities: Normal to inspection  No tenderness over the upper extremities bilaterally  No instability bilaterally  Strength: Motor strength is 5/5 bilaterally  Normal muscle tone bilaterally  Muscle bulk: Muscle bulk is normal bilaterally  Full ROM bilaterally  Lower Extremities: Normal to inspection and palpation  No tenderness of the lower extremities bilaterally  Exhibits no instability bilaterally  Strength: Motor strength is 5/5 bilaterally   Normal muscle tone bilaterally  Muscle Bulk: Muscle bulk is normal bilaterally  Full ROM bilaterally  Cranial Nerve Exam   II: Normal with no deficit  III,IV, VI: Normal with no deficit  V: Normal with no deficit  VII: Normal with no deficit  VIII: Normal with no deficit  IX: Normal with no deficit  X: Normal with no deficit  XI: Normal with no deficit  XII: Normal with no deficit  Recent and remote memory: Intact  Mood and affect: Normal        Results/Data  (1) LYME ANTIBODY PROFILE W/REFLEX TO WESTERN BLOT 23Oct2017 04:10PM Yogesh Hendricks [facility] Order Number: [id number]     Test Name Result Flag Reference   LYME IGG 6 01 H 0 00-0 79   POSITIVE(> or = 1 20)-Presence of Borrelia IgG antibodies  Current testing guidelines recommend that all positive samples be supplemented by further testing  Sample forwarded to reference lab for Western blot assay  LYME IGM 1 14 H 0 00-0 79   EQUIVOCAL (0 80-1 19) - Current testing guidelines recommend that all equivocal samples be supplemented by further testing  Sample forwarded to reference lab for Western blot assay         Future Appointments    Date/Time Provider Specialty Site   11/03/2017 01:45 PM Angie Butler DO Internal Medicine [facility] INTERNAL MED   12/29/2017 11:30 AM Judie Sweet, [facility]     Signatures   Electronically signed by : Elizabeth Najera MD; Oct 25 2017  6:47PM EST                       (Author)

## 2017-11-03 ENCOUNTER — GENERIC CONVERSION - ENCOUNTER (OUTPATIENT)
Dept: OTHER | Facility: OTHER | Age: 22
End: 2017-11-03

## 2017-11-03 ENCOUNTER — ALLSCRIPTS OFFICE VISIT (OUTPATIENT)
Dept: OTHER | Facility: OTHER | Age: 22
End: 2017-11-03

## 2017-11-09 ENCOUNTER — HOSPITAL ENCOUNTER (OUTPATIENT)
Dept: CT IMAGING | Facility: HOSPITAL | Age: 22
Discharge: HOME | End: 2017-11-09
Payer: COMMERCIAL

## 2017-11-09 DIAGNOSIS — M54.2 CERVICALGIA: ICD-10-CM

## 2017-11-09 DIAGNOSIS — H02.409 PTOSIS OF EYELID: ICD-10-CM

## 2017-11-09 PROCEDURE — 70496 CT ANGIOGRAPHY HEAD: CPT

## 2017-11-09 PROCEDURE — 70498 CT ANGIOGRAPHY NECK: CPT

## 2017-11-09 RX ADMIN — IOHEXOL 85 ML: 350 INJECTION, SOLUTION INTRAVENOUS at 13:30

## 2017-11-16 ENCOUNTER — GENERIC CONVERSION - ENCOUNTER (OUTPATIENT)
Dept: OTHER | Facility: OTHER | Age: 22
End: 2017-11-16

## 2017-11-28 ENCOUNTER — ALLSCRIPTS OFFICE VISIT (OUTPATIENT)
Dept: OTHER | Facility: OTHER | Age: 22
End: 2017-11-28

## 2017-12-14 ENCOUNTER — ALLSCRIPTS OFFICE VISIT (OUTPATIENT)
Dept: OTHER | Facility: OTHER | Age: 22
End: 2017-12-14

## 2017-12-20 NOTE — PROGRESS NOTES
Assessment  1  Lyme disease (478 81) (A69 20)  2  Postconcussive syndrome (310 2) (F07 81)    Plan   Occipital neuralgia of right side    · Start: Gabapentin 100 MG Oral Capsule; 1 tab qHS x 5 days, then 2 tabs qHS x 5 days,then 3 qHS  Rx By: Shona Holbrook; Dispense: 30 Days ; #:90 Capsule; Refill: 2;For: Occipital neuralgia of right side; MY = N; Verified Transmission to [facility]# [id number]; Last Updated By: System, SureScripts; 12/14/2017 9:54:36 AM   · Start: MethylPREDNISolone 4 MG Oral Tablet Therapy Pack (Medrol); take as directed onpackaging insert, with food, in AM  Rx By: Shona Holbrook; Dispense: 0 Days ; #:1 Tablet Therapy Pack; Refill: 0;For: Occipital neuralgia of right side; MY = N; Verified Transmission to [facility]# [id number]; Last Updated By: System, SureScripts; 12/14/2017 9:54:36 AM  Follow-up visit in 1 month Evaluation and Treatment  Follow-up  Status: Hold For - Scheduling  Requested for: 55LQM9966 Ordered; For: Lyme disease, Postconcussive syndrome;  Ordered By: Shona Holbrook  Performed:   Due: 74YZY5456   Discussion/Summary  Discussion Summary:   Will try gabapentin for headaches, ?occipital neuralgia and medrol dose pack to break cycle  will continue to follow him for this and lyme, will consider LP and repeat brain MRI moving forward if sxs do not improve with meds  will consider EMG and BW (autoimmune, etc)  pt getting new insurance in january which will cover more diagnostics and tests, therefore will order the additional tests at that time  The patient will follow up in 1-2 months or earlier if needed  The patient was encouraged to call in the meantime with any questions or concerns  The patient was told to call 911 or report to the nearest ER with any new or worsening neurological symptoms  He expressed understanding of the plan and was appreciative  Patient's Capacity to Self-Care: Patient is able to Self-Care     Medication SE Review and Pt Understands Tx: Possible side effects of new medications were reviewed with the patient/guardian today  The treatment plan was reviewed with the patient/guardian  The patient/guardian understands and agrees with the treatment plan   Patient Guardian understands agrees: The treatment plan was reviewed with the patient/guardian  The patient/guardian understands and agrees with the treatment plan      Chief Complaint  Chief Complaint Free Text Note Form: Patient presents for a follow up of rt sided head and left sided neck pain      History of Present Illness  HPI: Mr Crow Rouse is a 19 yo LH maleWho is following up in the Neurology office for head and neck pain status post head injuries (first on top of head in January 2016 and second in summer 2016 in the left neck/ jaw)  Since last seen the patient has tried tizanidine for his comfort and denies any improvement he  He continues to have head pain in the occipital regions bilaterally and radiating into the right parietal, temporal and frontal region  He denies eye pain, vision changes or numbness and tingling in the face or head  He reports pain in the left anterior neck region, around the left SCM  Denies neck weakness or radiating pain into the arms  He does sometimes feel a tension in the back of the neck, and âknots,â but no persistent spasm  Also report persistent palpitations and increased heart rate, intermittent but occurring daily  Entire cardiac eval unremarkable  States metoprolol helped, but he still gets breakthrough palpitations  Had infectious disease eval with no recs by them  He continues to have seropositive lyme disease despite being treated appropriately  Neuroburreliosis was not suspected by ID and LP was not recommended  Review of systems, Past medical history, Surgical history, Family history, Social history and Medication history were all reviewed today  Neurology HPI Mika Moon:  Headache: On a scale of 0-10, the pain severity is a 6  the headaches started at age 23    He experienced the following accidents/injury prior to onset of headaches: neck injury with heavy lumber material   Headaches are occurring daily  headaches are continuously present  Currently the pain is on the entire right side-- and-- in the occipital region  Warning(s) prior to headache include none  Usual headache is described as shooting and stabbing  Associated symptoms include with darkness-- and-- ptosis (right)  states cannot be alone in a quiet dark room due to his work, but-- no photophobia,-- no phonophobia,-- no tinnitus,-- no lacrimation,-- no sensitivity to smell,-- no flushing,-- no blurred vision,-- no loss of appetite,-- no nausea,-- no vomiting-- and-- no runny or stuffed up nose  Headaches are made worse by coughing,-- running or exercising-- and-- bending over, but-- not while sneezing,-- not while moving bowel,-- not while walking-- and-- not while climbing stairs  Headaches are triggered by not by fatigue,-- not stress/tension,-- not by changes in the weather,-- not eating certain foods,-- not with certain medication,-- not by coughing,-- not while oversleeping-- and-- not when lying down  Review of Systems  Neurological ROS:  Constitutional: no fever, no chills, no recent weight gain, no recent weight loss, no complaints of feeling tired, no changes in appetite  HEENT:  no sinus problems, not feeling congested, no blurred vision, no dryness of the eyes, no eye pain, no hearing loss, no tinnitus, no mouth sores, no sore throat, no hoarseness, no dysphagia, no masses, no bleeding  Cardiovascular:  no chest pain or pressure, no palpitations present, the heart rate was not rapid or irregular, no swelling in the arms or legs, no poor circulation  Respiratory:  no unusual or persistant cough, no shortness of breath with or without exertion  Gastrointestinal: changes in bowel habits    Genitourinary:  no incontinence, no feelings of urinary urgency, no increase in frequency, no urinary hesitancy, no dysuria, no hematuria  Musculoskeletal: head/neck/back pain  Integumentary  no masses, no rash, no skin lesions, no livedo reticularis  Psychiatric:  no anxiety, no depression, no mood swings, no psychiatric hospitalizations, no sleep problems  Endocrine  no unusual weight loss or gain, no excessive urination, no excessive thirst, no hair loss or gain, no hot or cold intolerance, no menstrual period change or irregularity, no loss of sexual ability or drive, no erection difficulty, no nipple discharge  Hematologic/Lymphatic:  no unusual bleeding, no tendency for easy bruising, no clotting skin or lumps  Neurological General:  no headache, no nausea or vomiting, no lightheadedness, no convulsions, no blackouts, no syncope, no trauma, no photopsia, no increased sleepiness, no trouble falling asleep, no snoring, no awakening at night  Neurological Mental Status:  no confusion, no mood swings, no alteration or loss of consciousness, no difficulty expressing/understanding speech, no memory problems  Neurological Cranial Nerves:  no blurry or double vision, no loss of vision, no face drooping, no facial numbness or weakness, no taste or smell loss/changes, no hearing loss or ringing, no vertigo or dizziness, no dysphagia, no slurred speech  Neurological Motor findings include:  no tremor, no twitching, no cramping(pre/post exercise), no atrophy  Neurological Coordination:  no unsteadiness, no vertigo or dizziness, no clumsiness, no problems reaching for objects  Neurological Sensory:  no numbness, no pain, no tingling, does not fall when eyes closed or taking a shower  Neurological Gait:  no difficulty walking, not falling to one side, no sensation of being pushed, has not had falls  ROS Reviewed:   ROS reviewed  Active Problems  1  Allergic rhinitis, unspecified chronicity, unspecified seasonality, unspecified trigger (477 9) (J30 9)  2  Headache disorder (784 0) (R51)  3   Heart palpitations (785 1) (R00 2)  4  Lyme disease (088 81) (A69 20)  5  Denied: History of Mental health disorder  6  Neck pain on left side (723 1) (M54 2)  7  No advance directives (V49 89) (Z78 9)  8  Occipital neuralgia of right side (723 8) (M54 81)  9  Ptosis of eyelid (374 30) (H02 409)  10  Denied: History of Substance abuse    Past Medical History  1  History of concussion (V15 52) (Z87 820)  2  Denied: History of Mental health disorder  3  Denied: History of Substance abuse    Family History  Mother   1  No family history of mental disorder  2  Denied: Family history of Substance abuse in family  Father   3  No family history of mental disorder  4  Denied: Family history of Substance abuse in family    Social History   · Dental care, regularly   · Former smoker (V15 82) (U38 644)   · Lives with family   · Living Situation: Supportive and safe   · No advance directives (V49 89) (Z78 9)    Current Meds  1  Fluticasone Propionate 50 MCG/ACT Nasal Suspension; use 2 sprays in each nostril once daily; Therapy: 05Oql7345 to (Evaluate:80Dyx3720)  Requested for: 72Bvv5631; Last Rx:86Xqd7299 Ordered  2  Metoprolol Succinate ER 25 MG Oral Tablet Extended Release 24 Hour; TAKE 1/2 TABLET DAILY; Therapy: 50Zxz1551 to (Evaluate:29Oct2018)  Requested for: 49CDO8802; Last Rx:03Nov2017 Ordered  3  TiZANidine HCl - 4 MG Oral Tablet; Take 1 tab nightly x 5 day, then 1 5 tabs nightly; Therapy: 97VAE0359 to (Last Rx:23Oct2017) Ordered    Allergies  1  No Known Drug Allergies    Vitals  Signs   Recorded: 35VHO5221 46:46YE   Systolic: 943  Diastolic: 62  Height: 5 ft 6 in  Weight: 139 lb   BMI Calculated: 22 44  BSA Calculated: 1 71    Physical Exam   Constitutional  General Appearance: Appears appropriate for age, healthy, well developed, appropriately groomed and appropriately dressed   Eyes  Ophthalmoscopic examination: Vision is grossly normal  Gross visual field testing by confrontation shows no abnormalities   EOMI in both eyes  Conjunctivae clear  Eyelids normal palpebral fissures equal  Orbits exhibit normal position  No discharge from the eyes  PERRL  External inspection of ears and nose: Normal      Neck  Neck and thyroid: Abnormal  -- left neck pain and right occipital notch tenderness to mild palpation  Cardiovascular  Auscultation of heart: Rate is regular  Rhythm is regular  Peripheral vascular exam: Normal pulses throughout, no tenderness, erythema or swelling  Musculoskeletal  Gait and Station: Walks with normal gait  Tandem walk test is normal  Romberg's test is negative  Muscle strength: Normal strength throughout  Muscle tone: No atrophy, abnormal movements, flaccidity, cogwheeling or spasticity  Range of motion: Normal     Stability: Normal     Inspection of temporomandibular joint appears normal    Neurologic  Orientation to person, place, and time: Normal    Attention span and concentration: Normal thought process and attention span  Language: Names objects, able to repeat phrases and speaks spontaneously  Fund of knowledge: Normal vocabulary with appropriate knowledge of current events and past history  Sensation: Intact sensation to pinprick, temperature, vibration, and proprioception in all four extremities  Reflexes: DTR's are normal and symmetric bilaterally  Babinski's reflex is negative bilaterally  No pathologic ankle clonus  Deep tendon reflexes: 1+ right ankle jerk-- and-- 1+ left ankle jerk2+ right biceps,-- 2+ left biceps,-- 2+ right brachioradialis,-- 2+ left brachioradialis,-- 2+ right patella-- and-- 2+ left patella  Coordination: Cerebellum function intact  No involuntary movement or psychomotor activity  Motor System: No pronator drift  Cranial Nerve Exam  II: Normal with no deficit  III,IV, VI: Normal with no deficit  V: Normal with no deficit  VII: Normal with no deficit  VIII: Normal with no deficit  IX: Normal with no deficit  X: Normal with no deficit     XI: Normal with no deficit  XII: Normal with no deficit  Recent and remote memory: Intact  Mood and affect: Normal        Future Appointments    Date/Time Provider Specialty Site   01/10/2018 10:15 AM Ya Alvarez  Internal Medicine [facility] INTERNAL MED   01/04/2018 09:45 AM Yony Lazo, [facility] Neurology  [id number]     Signatures   Electronically signed by :  Perry Dejesus, [facility]; Dec 14 2017  6:06PM EST                       (Author)    Electronically signed by : Michelle Bagley MD; Dec 19 2017  2:51PM EST                       (Author)    Electronically signed by : Michelle Bagley MD; Dec 19 2017  2:52PM EST                       (Author)

## 2018-01-04 ENCOUNTER — ALLSCRIPTS OFFICE VISIT (OUTPATIENT)
Dept: OTHER | Facility: OTHER | Age: 23
End: 2018-01-04

## 2018-01-04 ENCOUNTER — GENERIC CONVERSION - ENCOUNTER (OUTPATIENT)
Dept: OTHER | Facility: OTHER | Age: 23
End: 2018-01-04

## 2018-01-04 DIAGNOSIS — G43.709 CHRONIC MIGRAINE WITHOUT AURA WITHOUT STATUS MIGRAINOSUS, NOT INTRACTABLE: ICD-10-CM

## 2018-01-04 DIAGNOSIS — M54.81 OCCIPITAL NEURALGIA: ICD-10-CM

## 2018-01-04 DIAGNOSIS — A69.20 LYME DISEASE: ICD-10-CM

## 2018-01-10 ENCOUNTER — GENERIC CONVERSION - ENCOUNTER (OUTPATIENT)
Dept: OTHER | Facility: OTHER | Age: 23
End: 2018-01-10

## 2018-01-10 ENCOUNTER — ALLSCRIPTS OFFICE VISIT (OUTPATIENT)
Dept: OTHER | Facility: OTHER | Age: 23
End: 2018-01-10

## 2018-01-11 NOTE — MISCELLANEOUS
Message  Return to work or school:   Thiago Merchant is under my professional care   He was seen in my office on 08/21/2017   He is able to return to work on  08/23/2017 NO LIFTING HEAVIER THAN 25 LBS FOR 1 WK THEN MAY RESUME WITH NO LIMITATIONS            Signatures   Electronically signed by : MARTHA Patton ; Aug 24 2017  7:45AM EST

## 2018-01-11 NOTE — MISCELLANEOUS
Message  Return to work or school:   Ilia Mitchell is under my professional care  He was seen in my office on 08/22/2017   He is able to return to work on  immediately    He is able to perform activities of daily living without limitations  , He can perform work without limitations  This patient is released to return to normal duties at his job  [employer] DO [place]        Signatures   Electronically signed by : Nura Stallings DO; Aug 23 2017  5:08PM EST                       (Author)

## 2018-01-11 NOTE — RESULT NOTES
Verified Results  * CT HEAD WO CONTRAST [id number] 05:57PM Karlene Hanna Order Number: [id number]    - Patient Instructions: To schedule this appointment, please contact Central Scheduling at [phone]  Test Name Result Flag Reference   CT HEAD WO CONTRAST (Report)     CT BRAIN - WITHOUT CONTRAST     INDICATION: Pain in head for one month  COMPARISON: None  TECHNIQUE: CT examination of the brain was performed  In addition to axial images, coronal reformatted images were created and submitted for interpretation  Radiation dose length product (DLP) for this visit: 1064 57 mGy-cm   This examination, like all CT scans performed in the [facility], was performed utilizing techniques to minimize radiation dose exposure, including the use of    iterative reconstruction and automated exposure control  IMAGE QUALITY: Diagnostic  FINDINGS:      PARENCHYMA: No intracranial mass, mass effect or midline shift  No CT signs of acute infarction  There is no parenchymal hemorrhage  VENTRICLES AND EXTRA-AXIAL SPACES: Normal for patient's age  VISUALIZED ORBITS AND PARANASAL SINUSES: Unremarkable  CALVARIUM AND EXTRACRANIAL SOFT TISSUES:  Normal        IMPRESSION:     No acute intracranial abnormality         Workstation performed: [id number]     Signed by:   Sindhu 6DO   4/5/17

## 2018-01-12 VITALS
SYSTOLIC BLOOD PRESSURE: 128 MMHG | HEART RATE: 68 BPM | WEIGHT: 134.5 LBS | DIASTOLIC BLOOD PRESSURE: 78 MMHG | HEIGHT: 66 IN | BODY MASS INDEX: 21.62 KG/M2

## 2018-01-12 NOTE — PROGRESS NOTES
Assessment    1  Heart palpitations (785 1) (R00 2)    Discussion/Summary  Discussion Summary:   C/o of some symtoms of a one sided headache  tyllenol can help for headaches  Goals and Barriers: The patient has the current Goals: Above  The patent has the current Barriers: None  Medication SE Review and Pt Understands Tx: Possible side effects of new medications were reviewed with the patient/guardian today  Counseling Documentation With Imm: The patient was counseled regarding instructions for management  Self Referrals:   Self Referrals: No      Chief Complaint  Chief Complaint Free Text Note Form: Pt here for f/u today and to go over test results  Everything in chart  Has Card appt on 3/15/17  dk    nsr since last here  Chief Complaint Chronic Condition [facility]: Patient is here today for follow up of chronic conditions described in HPI  History of Present Illness  HPI: follow up of testing to eval palpitations and heart racing  Review of Systems  Complete-Male:   Constitutional: No fever or chills, feels well, no tiredness, no recent weight gain or weight loss  Eyes: No complaints of eye pain, no red eyes, no discharge from eyes, no itchy eyes  ENT: no complaints of earache, no hearing loss, no nosebleeds, no nasal discharge, no sore throat, no hoarseness  Cardiovascular: No complaints of slow heart rate, no fast heart rate, no chest pain, no palpitations, no leg claudication, no lower extremity  Respiratory: No complaints of shortness of breath, no wheezing, no cough, no SOB on exertion, no orthopnea or PND  Gastrointestinal: No complaints of abdominal pain, no constipation, no nausea or vomiting, no diarrhea or bloody stools  Genitourinary: No complaints of dysuria, no incontinence, no hesitancy, no nocturia, no genital lesion, no testicular pain  Musculoskeletal: No complaints of arthralgia, no myalgias, no joint swelling or stiffness, no limb pain or swelling  Integumentary: No complaints of skin rash or skin lesions, no itching, no skin wound, no dry skin  Neurological: No compliants of headache, no confusion, no convulsions, no numbness or tingling, no dizziness or fainting, no limb weakness, no difficulty walking  Psychiatric: Is not suicidal, no sleep disturbances, no anxiety or depression, no change in personality, no emotional problems  Endocrine: No complaints of proptosis, no hot flashes, no muscle weakness, no erectile dysfunction, no deepening of the voice, no feelings of weakness  Hematologic/Lymphatic: No complaints of swollen glands, no swollen glands in the neck, does not bleed easily, no easy bruising  Active Problems    1  Heart palpitations (785 1) (R00 2)   2  Neck pain (723 1) (M54 2)    Current Meds   1  No Reported Medications Recorded    Allergies    1  No Known Drug Allergies    Vitals  Vital Signs    Recorded: 61SSU4297 84:18PW   Systolic 325, LUE, Sitting   Diastolic 64, LUE, Sitting   Height 5 ft 6 in   Weight 135 lb    BMI Calculated 21 79   BSA Calculated 1 69     Physical Exam    Constitutional   General appearance: No acute distress, well appearing and well nourished  Eyes   Conjunctiva and lids: No swelling, erythema, or discharge  Pupils and irises: Equal, round and reactive to light  Ears, Nose, Mouth, and Throat   External inspection of ears and nose: Normal     Otoscopic examination: Tympanic membrance translucent with normal light reflex  Canals patent without erythema  Nasal mucosa, septum, and turbinates: Normal without edema or erythema  Oropharynx: Normal with no erythema, edema, exudate or lesions  Pulmonary   Respiratory effort: No increased work of breathing or signs of respiratory distress  Auscultation of lungs: Clear to auscultation, equal breath sounds bilaterally, no wheezes, no rales, no rhonci  Cardiovascular   Palpation of heart: Normal PMI, no thrills      Auscultation of heart: Normal rate and rhythm, normal S1 and S2, without murmurs  Examination of extremities for edema and/or varicosities: Normal     Carotid pulses: Normal     Abdomen   Abdomen: Non-tender, no masses  Liver and spleen: No hepatomegaly or splenomegaly  Lymphatic   Palpation of lymph nodes in neck: No lymphadenopathy  Musculoskeletal   Gait and station: Normal     Digits and nails: Normal without clubbing or cyanosis  Inspection/palpation of joints, bones, and muscles: Normal     Skin   Skin and subcutaneous tissue: Normal without rashes or lesions  Neurologic   Cranial nerves: Cranial nerves 2-12 intact  Reflexes: 2+ and symmetric  Sensation: No sensory loss  Psychiatric   Orientation to person, place and time: Normal     Mood and affect: Normal          Future Appointments    Date/Time Provider Specialty Site   03/15/2017 02:20 PM MARTHA Ha   Cardiology [address] [id number] [address]      Signatures   Electronically signed by : MARTHA Shaw ; Mar  9 2017  3:56PM EST                       (Author)

## 2018-01-13 VITALS
DIASTOLIC BLOOD PRESSURE: 72 MMHG | HEART RATE: 91 BPM | BODY MASS INDEX: 22.02 KG/M2 | HEIGHT: 66 IN | SYSTOLIC BLOOD PRESSURE: 110 MMHG | WEIGHT: 137.03 LBS

## 2018-01-13 VITALS
SYSTOLIC BLOOD PRESSURE: 140 MMHG | TEMPERATURE: 98.6 F | HEART RATE: 90 BPM | BODY MASS INDEX: 21.26 KG/M2 | WEIGHT: 132.31 LBS | HEIGHT: 66 IN | DIASTOLIC BLOOD PRESSURE: 82 MMHG

## 2018-01-13 VITALS
BODY MASS INDEX: 22.06 KG/M2 | SYSTOLIC BLOOD PRESSURE: 132 MMHG | HEIGHT: 66 IN | DIASTOLIC BLOOD PRESSURE: 70 MMHG | HEART RATE: 111 BPM | WEIGHT: 137.25 LBS

## 2018-01-13 VITALS
RESPIRATION RATE: 14 BRPM | BODY MASS INDEX: 21.69 KG/M2 | HEART RATE: 80 BPM | HEIGHT: 66 IN | OXYGEN SATURATION: 97 % | SYSTOLIC BLOOD PRESSURE: 110 MMHG | WEIGHT: 135 LBS | DIASTOLIC BLOOD PRESSURE: 70 MMHG

## 2018-01-13 NOTE — RESULT NOTES
Verified Results  XR NECK SOFT TISSUE [id number] 04:22PM Rene Laser   TW Order Number: [id number]     Test Name Result Flag Reference   XR NECK SOFT TISSUE (Report)     NECK SOFT TISSUE EXAMINATION     INDICATION: Generalized neck tightness and swelling     COMPARISON: None     VIEWS: Lateral     IMAGES: 1     FINDINGS:     The epiglottis and aryepiglottic folds are unremarkable in appearance  No radiopaque foreign bodies are appreciated  There are no osseous abnormalities  IMPRESSION:     Unremarkable study  Workstation performed: [id number]     Signed by:    Tadeo Thompson MD   3/29/17

## 2018-01-13 NOTE — RESULT NOTES
Verified Results  US THYROID [id number] 12:35PM Ti Fernandez Order Number: [id number]    - Patient Instructions: To schedule this appointment, please contact Central Scheduling at [phone]  Test Name Result Flag Reference   US THYROID (Report)     THYROID ULTRASOUND     INDICATION: M54 2: Cervicalgia  History taken directly from the electronic ordering system  COMPARISON: None  TECHNIQUE:  Ultrasound of the thyroid was performed with a high frequency linear transducer in transverse and sagittal planes including volumetric imaging sweeps as well as traditional still imaging technique  FINDINGS:   Normal homogeneous smooth echotexture  Right gland: 3 56 x 1 07 x 1 34 cm  No dominant nodules  Small 2 mm colloid cyst seen in the right lobe of no clinical significance  Left gland: 3 77 x 0 90 x 1 43 cm  No dominant nodules  Isthmus: The isthmus is 0 14 cm in AP dimension  IMPRESSION:      Normal thyroid ultrasound  No discrete or suspicious mass         Workstation performed: [id number]     Signed by:   Frandy Sanon MD   2/27/17

## 2018-01-14 VITALS
RESPIRATION RATE: 16 BRPM | BODY MASS INDEX: 21.53 KG/M2 | WEIGHT: 134 LBS | HEIGHT: 66 IN | DIASTOLIC BLOOD PRESSURE: 78 MMHG | SYSTOLIC BLOOD PRESSURE: 132 MMHG | HEART RATE: 84 BPM

## 2018-01-14 VITALS
DIASTOLIC BLOOD PRESSURE: 80 MMHG | BODY MASS INDEX: 21.69 KG/M2 | HEIGHT: 66 IN | WEIGHT: 135 LBS | SYSTOLIC BLOOD PRESSURE: 130 MMHG | HEART RATE: 80 BPM

## 2018-01-14 NOTE — RESULT NOTES
Verified Results  * CT SOFT TISSUE NECK W CONTRAST [id number] 06:41PM Hay Talbot Order Number: [id number]    - Patient Instructions: To schedule this appointment, please contact Central Scheduling at [phone]  Test Name Result Flag Reference   CT SOFT TISSUE NECK W CONTRAST (Report)     CT NECK WITH CONTRAST     INDICATION: Localized adenopathy     COMPARISON: CT of the brain 4/4/2017     TECHNIQUE: Contiguous 2 5 mm images were obtained through the neck after administration of intravenous contrast       Radiation dose length product (DLP) for this visit: 528 mGy-cm   This examination, like all CT scans performed in the [facility], was performed utilizing techniques to minimize radiation dose exposure, including the use of iterative    reconstruction and automated exposure control  IV Contrast: 85 mL of iohexol (OMNIPAQUE)        IMAGE QUALITY: Diagnostic  FINDINGS:     VISUALIZED BRAIN PARENCHYMA: No acute intracranial pathology of the visualized brain parenchyma  VISUALIZED ORBITS AND PARANASAL SINUSES: Normal      NASAL CAVITY AND NASOPHARYNX: Normal      SUPRAHYOID NECK: Normal oral cavity, tongue base, tonsillar fossa and epiglottis  Prevertebral soft tissues are normal  Parapharyngeal, , sublingual and submandibular spaces are normal      INFRAHYOID NECK: Aryepiglottic folds, laryngeal tissues, and piriform sinuses are normal         THYROID GLAND:   Normal      PAROTID AND SUBMANDIBULAR GLANDS: Normal      LYMPH NODES: No pathologic or enlarged adenopathy  VASCULAR STRUCTURES: Normal enhancement of the cervical vasculature  THORACIC INLET: Lung apices and upper mediastinum are unremarkable  BONY STRUCTURES: Normal        IMPRESSION:     Normal enhanced CT of the neck  Minor reactive adenopathy, none of which, based on size criteria is pathologic  Workstation performed: [id number]     Signed by:    Jannette Cole MD   8/28/17

## 2018-01-14 NOTE — MISCELLANEOUS
Message  I have received his MRI brain without contrast done 6/2017 (Injury) 1/2017  This did not show any significant structural abnormalities  Per radiology whom we received these records from, no other neuroimaging  Would like to obtain CTA head and neck for further evaluation especially due to right eye ptosis noted with severe headaches reported by friends and family as well (he did not have this on my exam today)  Will also refer to ID, as his repeat Lyme testing is significantly abnormal as well (despite recent antibiotic treatment per his primary care physician)  Called patient to notify above and obtained voicemail, will attempt again to contact patient  Hailey Eladio DO      Plan  Lyme disease    · (1) COMPREHENSIVE METABOLIC PANEL; Status:Active; Requested [facility]:[id number];   Neck pain on left side    · 1 - Aldea DO, [facility]  *  Status: Hold For - Scheduling   Requested for: [id number]  Care Summary provided  : Yes  Neck pain on left side, Ptosis of eyelid    · CTA HEAD AND NECK W WO CONTRAST; Status:Need Information - Financial  Authorization;  Requested EEN:[id number];     Signatures   Electronically signed by : [address], [place]; Oct 26 2017 10:54AM EST                       (Author)

## 2018-01-14 NOTE — MISCELLANEOUS
Message  Return to work or school:   Lynne May is under my professional care  He was seen in my office on 08/21/2017   He is able to return to work on  08/23/2017  25LB   LIFTING LIMITATIONS UNITL MONDAY 9/4/17 THEN RESUME WITH NO LIMITATIONS            Signatures   Electronically signed by : MARTHA Chávez ; Aug 24 2017  7:45AM EST

## 2018-01-16 NOTE — RESULT NOTES
Verified Results  (1) TSH [id number] 08:13AM Neris Ricci   TW Order Number: [id number]     Test Name Result Flag Reference   TSH 1 615 uIU/mL  0 358-3 740   - Patient Instructions: This bloodwork is non-fasting  Please drink two glasses of water morning of bloodwork  - Patient Instructions: This bloodwork is non-fasting  Please drink two glasses of water morning of bloodwork  Patients undergoing fluorescein dye angiography may retain small amounts of fluorescein in the body for 48-72 hours post procedure  Samples containing fluorescein can produce falsely depressed TSH values  If the patient had this procedure,a specimen should be resubmitted post fluorescein clearance

## 2018-01-17 NOTE — MISCELLANEOUS
Message  Return to work or school:      He is able to work with limitations (for next 10 days)  [employer] has a diagnosis of mild concussion, he is to be on light duty with no heavy lifting for the next 10 days          Signatures   Electronically signed by : Gosia Gomez, ; May 16 2017  2:19PM EST                       (/Recorder)

## 2018-01-18 NOTE — PROGRESS NOTES
History of Present Illness  HPI: Patient is a 54-year-old male referred to me for input regarding positive Lyme test  Patient states that he developed symptoms left anterior neck pain and right sided forehead pain in January 2017  This started after he sustained an injury at work where he was hit in the neck by piece of lumbar in December 2016  he felt fine until about 1 month later when he developed severe anterior left neck pain and stabbing right forehead pain which comes and goes  Sometimes it is so severe that it is associated with photophobia in his right eye  No nausea or vomiting  No fevers or chills  No URI symptoms  No numbness or tingling  No cognitive issues  She also developed episodes of daily palpitations for which she was seen by Cardiology and had extensive workup including a Holter monitor and echocardiogram, which were negative  He was started on metoprolol for his palpitations, but says he still gets she daily palpitations  No chest pain, Shortness of breath, or cough  Denies any rash or joint pain  Does not ever recall being bitten by a tick or having a circular lesion on his skin  Denies any posterior neck pain or neck stiffness  patient received treatment for out positive Lyme Western blot IgG with doxycycline for 4 weeks from August to September, but this did not provide any alleviation of the symptoms  Review of Systems  Complete-Male:   Constitutional: no fever and no chills  Eyes: no eye pain and eyes not red  ENT: no earache and no hearing loss  Cardiovascular: palpitations, but no chest pain and no extremity edema  Respiratory: no shortness of breath, no cough, no wheezing and no shortness of breath during exertion  Gastrointestinal: no abdominal pain, no nausea, no constipation and no diarrhea  Genitourinary: no dysuria  Musculoskeletal: no arthralgias, no joint swelling, no myalgias and no joint stiffness     Integumentary: no rashes, no itching, no skin lesions and no skin wound  Neurological: headache, but no numbness, no tingling, no confusion, no dizziness, no limb weakness, no convulsions, no fainting and no difficulty walking  Psychiatric: no anxiety  Endocrine: no muscle weakness  Hematologic/Lymphatic: no swollen glands  ROS Reviewed:   ROS reviewed  Active Problems    1  Allergic rhinitis, unspecified chronicity, unspecified seasonality, unspecified trigger   (477 9) (J30 9)   2  Headache disorder (784 0) (R51)   3  Heart palpitations (785 1) (R00 2)   4  Lyme disease (088 81) (A69 20)   5  Denied: History of Mental health disorder   6  Neck pain on left side (723 1) (M54 2)   7  No advance directives (V48 89) (Z78 9)   8  Occipital neuralgia of right side (723 8) (M54 81)   9  Ptosis of eyelid (374 30) (H02 409)   10  Denied: History of Substance abuse    Past Medical History    1  History of concussion (V15 52) (Z87 820)   2  Denied: History of Mental health disorder   3  Denied: History of Substance abuse  Active Problems And Past Medical History Reviewed: The active problems and past medical history were reviewed and updated today  Surgical History  Surgical History Reviewed: The surgical history was reviewed and updated today  Family History    1  No family history of mental disorder   2  Denied: Family history of Substance abuse in family    3  No family history of mental disorder   4  Denied: Family history of Substance abuse in family  Family History Reviewed: The family history was reviewed and updated today  Social History    · Dental care, regularly   · Former smoker (Q09 45) (N96 436)   · Lives with family   · Living Situation: Supportive and safe   · No advance directives (V41 89) (Z78 9)  Social History Reviewed: The social history was reviewed and updated today  Current Meds   1  Fluticasone Propionate 50 MCG/ACT Nasal Suspension; use 2 sprays in each nostril   once daily;    Therapy: 27UND4261 to (Evaluate:71Nqr6059)  Requested for: 18Vvs7324; Last   Rx:61Fom7369 Ordered   2  Metoprolol Succinate ER 25 MG Oral Tablet Extended Release 24 Hour; TAKE 1/2   TABLET DAILY; Therapy: 92Zle8583 to (Evaluate:29Oct2018)  Requested for: 18FTX5286; Last   Rx:03Nov2017 Ordered   3  TiZANidine HCl - 4 MG Oral Tablet; Take 1 tab nightly x 5 day, then 1 5 tabs nightly; Therapy: 54LFM6160 to (Last Rx:23Oct2017) Ordered  Medication List Reviewed: The medication list was reviewed and updated today  Allergies    1  No Known Drug Allergies    Vitals  Signs   Recorded: 54AIC3963 03:23PM   Temperature: 97 4 F  Heart Rate: 102  Respiration: 14  Systolic: 326  Diastolic: 66  Height: 5 ft 6 in  Weight: 135 lb 9 6 oz  BMI Calculated: 21 89  BSA Calculated: 1 7  O2 Saturation: 97    Physical Exam    Constitutional   General appearance: No acute distress, well appearing and well nourished  Eyes   Conjunctiva and lids: No swelling, erythema, or discharge  Ears, Nose, Mouth, and Throat   Nasal mucosa, septum, and turbinates: Normal without edema or erythema  Oropharynx: Normal with no erythema, edema, exudate or lesions  Pulmonary   Respiratory effort: No increased work of breathing or signs of respiratory distress  Auscultation of lungs: Clear to auscultation, equal breath sounds bilaterally, no wheezes, no rales, no rhonci  Cardiovascular   Auscultation of heart: Normal rate and rhythm, normal S1 and S2, without murmurs  Abdomen   Abdomen: Non-tender, no masses  Lymphatic   Palpation of lymph nodes in neck: No lymphadenopathy  Musculoskeletal   Gait and station: Normal     Digits and nails: Normal without clubbing or cyanosis  Inspection/palpation of joints, bones, and muscles: Normal     Skin   Skin and subcutaneous tissue: Normal without rashes or lesions  Psychiatric   Orientation to person, place and time: Normal     Mood and affect: Normal          Assessment    1   Lyme disease (192 66) (A69 20)   2  Heart palpitations (785 1) (R00 2)   3  Occipital neuralgia of right side (723 8) (M54 81)    Discussion/Summary  Discussion Summary:   18-year-old male with history of chronic left anterior neck pain and right forehead pain since January 2017 after sustaining a traumatic injury to his neck  1  Positive Lyme IgG  Testing for Lyme revealed a Western blot positive for IgG  Patient received 4 weeks of doxycycline from August to September 2017 with no improvement in his symptoms  I have a low suspicion that patient's symptoms are related to Lyme disease  He has no symptoms to suggest aseptic meningitis  He may have radiculitis, but even if this was related to Lyme, patient has already received an extended course of doxycycline for 4 weeks  No cranial nerve palsies on exam  Denies ever having erythema migrans, joint swelling or pain  I do not see an indication to retreat at this point  2  Left anterior neck and right forehead pain  Unclear etiology  CTA of head and neck and of soft tissue neck were normal  May be related to traumatic injury causing ongoing neuropathic pain  I have low suspicion that this is a sequela of Lyme disease, but even if it were, patient has received appropriate treatment  If there is any concern for neuroborreliosis, the next step would be a lumbar puncture to check for Lyme PCR, cell count, protein, glucose, culture  At this point, I do not see any abnormalities on physical exam and patient's symptoms are not suggestive of underlying Lyme disease, so I will hold off on LP  Advised patient to follow up with Neurology  3  Palpitations  Patient had a negative Holter monitor done earlier this year  He has been evaluated by Cardiology on two occasions and has been in normal sinus rhythm  Echocardiogram was negative  No signs of heart block on testing to suggest cardiac Lyme disease  Discussed assessment with patient and his father in detail         Counseling Documentation With Imm: The patient, patient's family was counseled regarding diagnostic results, instructions for management, prognosis, patient and family education, risks and benefits of treatment options, importance of compliance with treatment  Medication SE Review and Pt Understands Tx: The treatment plan was reviewed with the patient/guardian  The patient/guardian understands and agrees with the treatment plan      Future Appointments    Date/Time Provider Specialty Site   12/15/2017 01:30 PM Juany Warren DO Internal Medicine [facility] INTERNAL MED   12/28/2017 09:30 AM Juany Warren DO Internal Medicine [facility] INTERNAL MED   12/29/2017 11:30 AM Antwon Ryan, [facility] Neurology  [id number]     Signatures   Electronically signed by :  Morelia Packer DO; Nov 28 2017  4:23PM EST                       (Author)

## 2018-01-18 NOTE — RESULT NOTES
Verified Results  ECHO COMPLETE WITH CONTRAST IF INDICATED [id number] 12:36PM Ja Shi Order Number: [id number]    - Patient Instructions: To schedule this appointment, please contact Central Scheduling at [phone]  Test Name Result Flag Reference   ECHO COMPLETE WITH CONTRAST IF INDICATED (Report)     [address] [facility] [address] [facility]   [phone]     Transthoracic Echocardiogram   2D, M-mode, Doppler, and Color Doppler     Study date: 2017     Patient: Faiza Wilkerson   MR number: [id number]   Account number: [de-identified]   : 1995   Age: 24 years   Gender: Male   Status: Outpatient   Location: Echo lab   Height: 66 in   Weight: 132 lb   BP: 140/ 82 mmHg     Indications: Palpitations  Diagnoses: R00 2 - Palpitations     Sonographer: Marcelle Quinteros BS, RCS   Primary Physician: Odalis Hanna MD   Referring Physician: Odalis Hanna MD   Group: [facility]   Interpreting Physician: Kailash Garcia MD     SUMMARY     LEFT VENTRICLE:   Systolic function was normal  Ejection fraction was estimated to be 65 %  There were no regional wall motion abnormalities  MITRAL VALVE:   There was trace regurgitation  TRICUSPID VALVE:   There was trace regurgitation  Pulmonary artery systolic pressure was within the normal range  HISTORY: PRIOR HISTORY: Patient has no history of cardiovascular disease  PROCEDURE: The procedure was performed in the echo lab  This was a routine study  The transthoracic approach was used  The study included complete 2D imaging, M-mode, complete spectral Doppler, and color Doppler  Images were obtained from   the parasternal, apical, subcostal, and suprasternal notch acoustic windows  Image quality was good  LEFT VENTRICLE: Size was normal  Systolic function was normal  Ejection fraction was estimated to be 65 %  There were no regional wall motion abnormalities  Wall thickness was normal  DOPPLER: Left ventricular diastolic function parameters   were normal      RIGHT VENTRICLE: The size was normal  Systolic function was normal  Wall thickness was normal      LEFT ATRIUM: Size was normal      RIGHT ATRIUM: Size was normal      MITRAL VALVE: Valve structure was normal  There was normal leaflet separation  DOPPLER: The transmitral velocity was within the normal range  There was no evidence for stenosis  There was trace regurgitation  AORTIC VALVE: The valve was trileaflet  Leaflets exhibited normal thickness and normal cuspal separation  DOPPLER: Transaortic velocity was within the normal range  There was no evidence for stenosis  There was no significant   regurgitation  TRICUSPID VALVE: The valve structure was normal  There was normal leaflet separation  DOPPLER: The transtricuspid velocity was within the normal range  There was no evidence for stenosis  There was trace regurgitation  Pulmonary artery   systolic pressure was within the normal range  PULMONIC VALVE: Leaflets exhibited normal thickness, no calcification, and normal cuspal separation  DOPPLER: The transpulmonic velocity was within the normal range  There was no significant regurgitation  PERICARDIUM: There was no pericardial effusion  The pericardium was normal in appearance  AORTA: The root exhibited normal size  SYSTEMIC VEINS: IVC: The inferior vena cava was normal in size  PULMONARY VEINS: DOPPLER: Doppler flow pattern was normal in the pulmonary vein(s)       SYSTEM MEASUREMENT TABLES     2D   %FS: 33 82 %   Ao Diam: 2 68 cm   EDV(Teich): 94 83 ml   EF(Teich): 62 78 %   ESV(Cube): 27 28 ml   ESV(Teich): 35 3 ml   IVSd: 0 76 cm   LA Area: 14 29 cm2   LA Diam: 3 06 cm   LVEDV MOD A4C: 87 38 ml   LVEF MOD A4C: 60 14 %   LVESV MOD A4C: 34 83 ml   LVIDd: 4 55 cm   LVIDs: 3 01 cm   LVLd A4C: 7 84 cm   LVLs A4C: 6 18 cm   LVOT Diam: 1 97 cm   LVPWd: 0 91 cm   RA Area: 13 35 cm2   RV Diam : 3 47 cm   SV MOD A4C: 52 55 ml   SV(Cube): 66 85 ml   SV(Teich): 59 53 ml     CW   TR Vmax: 2 11 m/s   TR maxP 75 mmHg     MM   TAPSE: 2 04 cm     PW   E': 0 15 m/s   E/E': 5 49   MV A Hadley: 0 74 m/s   MV Dec Scurry: 4 1 m/s2   MV DecT: 206 54 ms   MV E Hadley: 0 85 m/s   MV E/A Ratio: 1 14     [employer] Accredited Echocardiography Laboratory     Prepared and electronically signed by     Carmen Russell MD   Signed [id number] 15:49:31

## 2018-01-22 VITALS
WEIGHT: 135 LBS | BODY MASS INDEX: 21.69 KG/M2 | HEIGHT: 66 IN | DIASTOLIC BLOOD PRESSURE: 64 MMHG | SYSTOLIC BLOOD PRESSURE: 122 MMHG

## 2018-01-22 VITALS
TEMPERATURE: 97.4 F | HEART RATE: 102 BPM | HEIGHT: 66 IN | DIASTOLIC BLOOD PRESSURE: 66 MMHG | OXYGEN SATURATION: 97 % | WEIGHT: 135.6 LBS | RESPIRATION RATE: 14 BRPM | BODY MASS INDEX: 21.79 KG/M2 | SYSTOLIC BLOOD PRESSURE: 104 MMHG

## 2018-01-22 VITALS
DIASTOLIC BLOOD PRESSURE: 70 MMHG | HEART RATE: 64 BPM | WEIGHT: 137 LBS | HEIGHT: 66 IN | SYSTOLIC BLOOD PRESSURE: 110 MMHG | TEMPERATURE: 96.8 F | BODY MASS INDEX: 22.02 KG/M2

## 2018-01-22 VITALS
BODY MASS INDEX: 21.75 KG/M2 | DIASTOLIC BLOOD PRESSURE: 88 MMHG | TEMPERATURE: 97.6 F | HEIGHT: 66 IN | SYSTOLIC BLOOD PRESSURE: 130 MMHG | HEART RATE: 80 BPM | WEIGHT: 135.31 LBS

## 2018-01-22 VITALS
WEIGHT: 139 LBS | BODY MASS INDEX: 22.34 KG/M2 | HEIGHT: 66 IN | DIASTOLIC BLOOD PRESSURE: 62 MMHG | SYSTOLIC BLOOD PRESSURE: 118 MMHG

## 2018-01-22 VITALS
BODY MASS INDEX: 21.86 KG/M2 | SYSTOLIC BLOOD PRESSURE: 90 MMHG | HEIGHT: 66 IN | DIASTOLIC BLOOD PRESSURE: 58 MMHG | WEIGHT: 136 LBS | HEART RATE: 78 BPM

## 2018-01-23 NOTE — PROGRESS NOTES
Assessment   1  Chronic migraine (346 70) (G43 709)   2  Lyme disease (088 81) (A69 20)   3  Neck pain on left side (723 1) (M54 2)   4  Occipital neuralgia of right side (723 8) (M54 81)   5  Postconcussive syndrome (310 2) (F07 81)    Plan    Chronic migraine    · Indomethacin 25 MG Oral Capsule; 1-2 capsule TID prn headache with food and    water  Do not take with advil   Rx By: Germania Landin; Dispense: 0 Days ; #:20 Capsule; Refill: 1;For: Chronic migraine; MY = N; Verified Transmission to [facility]/PHARMACY# [id number]; Last Updated By: System, [employer]; 1/4/2018 10:41:22 AM  Chronic migraine, Lyme disease    · (1) LYME ANTIBODY PROFILE W/REFLEX TO WESTERN BLOT; Status:Active; Requested    DET:[id number]; Perform:[facility]; MCA:[id number];HAEXOOM; For:Chronic migraine, Lyme disease; Ordered By:Miriam Crews;      Follow-up visit in 2 months Evaluation and Treatment  Follow-up  Status: Hold For - Scheduling  Requested for: 82DLD3412     Ordered; For: Chronic migraine, Lyme disease, Occipital neuralgia of right side, Postconcussive syndrome;  Ordered By: Germania Landin  Performed:   Due: 20FTV6703      Occipital neuralgia of right side (723 8) (M54 81)               Discussion/Summary   Discussion Summary:    Gabapentin was initially helpful for the headaches and sleep, but now it may be wearing off or is less effective  He is agreeable to trying nortriptyline is at this time which may be more helpful for sleep and muscle relaxation  The etiology of his left anterior head neck and chest pain is likely myofascial, and we will consider another soft tissue MRI after discussion with attending  I will need to call Infectious Disease; I have ordered repeat Lyme blood work to assess IgM  At the onset of an occipital neuralgia episode he will try indomethacin with food  Common side effects of all medications were reviewed in detail today     patient was encouraged to call the office with any questions or concerns  1-2 months  Patient's Capacity to Self-Care: Patient is able to Self-Care  Medication SE Review and Pt Understands Tx: Possible side effects of new medications were reviewed with the patient/guardian today  The treatment plan was reviewed with the patient/guardian  The patient/guardian understands and agrees with the treatment plan    Patient Guardian understands agrees: The treatment plan was reviewed with the patient/guardian  The patient/guardian understands and agrees with the treatment plan      Chief Complaint   Chief Complaint Free Text Note Form: Patient presents for a postconcussive syndrome  History of Present Illness   HPI: Mr Rosario Reyes is a 21 yo LH male who is following up in the Neurology office for head and neck pain status post head injuries (first on top of head in January 2016 and second in summer 2016 in the left neck/ jaw)  father is present today  last seen the patient has tried tizanidine for his comfort and denies any improvements  Felt gabapentin helped initially with headaches and sleep, but now may be wearing off    continues to have head pain in the occipital regions bilaterally and radiating into the right parietal, temporal and frontal region  He denies eye pain, vision changes or numbness and tingling in the face or head  He reports pain in the left anterior neck region, around the left SCM, and radiating into the left chest region  Denies neck weakness or radiating pain into the arms  He does sometimes feel a tension in the back of the neck, and âknots,â but no persistent spasm  report persistent palpitations and increased heart rate, intermittent but occurring daily  Entire cardiac eval unremarkable  States metoprolol helped, but he still gets breakthrough palpitations  infectious disease eval with no recs by them  He continues to have seropositive lyme disease despite being treated appropriately   Neuroborreliosis was not suspected by ID and LP was not recommended  MRI wo 6/6/17 unremarkable  MRI wo 6/6/17 Straightening of the upper cervical spine secondary to positioning or muscle spasm no compression deformity or spondylolisthesis mild disc desiccation from C2-C3 through C5-C6     10/23/2017: Positive Lyme IgG and IgM  7/31/2017 also shows positive Lyme disease antibody testing  of systems, Past medical history, Surgical history, Family history, Social history and Medication history were all reviewed today  Neurology HPI Miguel Wells:      Headache: On a scale of 0-10, the pain severity is a 6  the headaches started at age 23  He experienced the following accidents/injury prior to onset of headaches: neck injury with heavy lumber material   Headaches are occurring daily  headaches are continuously present  Currently the pain is on the entire right side-- and-- in the occipital region  Warning(s) prior to headache include none  Usual headache is described as shooting and stabbing  Associated symptoms include with darkness-- and-- ptosis (right)  states cannot be alone in a quiet dark room due to his work, but-- no photophobia,-- no phonophobia,-- no tinnitus,-- no lacrimation,-- no sensitivity to smell,-- no flushing,-- no blurred vision,-- no loss of appetite,-- no nausea,-- no vomiting-- and-- no runny or stuffed up nose  Headaches are made worse by coughing,-- running or exercising-- and-- bending over, but-- not while sneezing,-- not while moving bowel,-- not while walking-- and-- not while climbing stairs  Headaches are triggered by not by fatigue,-- not stress/tension,-- not by changes in the weather,-- not eating certain foods,-- not with certain medication,-- not by coughing,-- not while oversleeping-- and-- not when lying down        Review of Systems   Neurological ROS:      Constitutional: no fever, no chills, no recent weight gain, no recent weight loss, no complaints of feeling tired, no changes in appetite  HEENT:  no sinus problems, not feeling congested, no blurred vision, no dryness of the eyes, no eye pain, no hearing loss, no tinnitus, no mouth sores, no sore throat, no hoarseness, no dysphagia, no masses, no bleeding  Cardiovascular: chest pain or pressure-- and-- palpitations present   Respiratory:  no unusual or persistant cough, no shortness of breath with or without exertion  Gastrointestinal:  no nausea, no vomiting, no diarrhea, no abdominal pain, no changes in bowel habits, no melena, no loss of bowel control  Genitourinary:  no incontinence, no feelings of urinary urgency, no increase in frequency, no urinary hesitancy, no dysuria, no hematuria  Musculoskeletal:  no arthralgias, no myalgias, no immobility or loss of function, no head/neck/back pain, no pain while walking  Integumentary  no masses, no rash, no skin lesions, no livedo reticularis  Endocrine  no unusual weight loss or gain, no excessive urination, no excessive thirst, no hair loss or gain, no hot or cold intolerance, no menstrual period change or irregularity, no loss of sexual ability or drive, no erection difficulty, no nipple discharge  Hematologic/Lymphatic:  no unusual bleeding, no tendency for easy bruising, no clotting skin or lumps  Neurological General: headache,-- increased sleepiness,-- trouble falling asleep-- and-- waking up at night  Neurological Mental Status:  no confusion, no mood swings, no alteration or loss of consciousness, no difficulty expressing/understanding speech, no memory problems  Neurological Cranial Nerves: vertigo or dizziness  Neurological Motor findings include:  no tremor, no twitching, no cramping(pre/post exercise), no atrophy  Neurological Coordination: balance difficulties  Neurological Sensory:  no numbness, no pain, no tingling, does not fall when eyes closed or taking a shower        Neurological Gait:  no difficulty walking, not falling to one side, no sensation of being pushed, has not had falls  ROS Reviewed:    ROS reviewed  Active Problems    1  Allergic rhinitis, unspecified chronicity, unspecified seasonality, unspecified trigger     (477 9) (J30 9)   2  Heart palpitations (785 1) (R00 2)   3  Lyme disease (088 81) (A69 20)   4  Denied: History of Mental health disorder   5  No advance directives (V49 89) (Z78 9)   6  Denied: History of Substance abuse      Neck pain on left side (723 1) (M54 2)             Headache disorder (784 0) (R51)             Occipital neuralgia of right side (723 8) (M54 81)             Ptosis of eyelid (374 30) (H02 409)        - transient- with severe headaches             Postconcussive syndrome (310 2) (F07 81)               Past Medical History   1  History of concussion (V15 52) (Z87 820)   2  Denied: History of Mental health disorder   3  Denied: History of Substance abuse    Family History   Mother    1  No family history of mental disorder   2  Denied: Family history of Substance abuse in family  Father    3  No family history of mental disorder   4  Denied: Family history of Substance abuse in family    Social History    · Dental care, regularly   · Former smoker (V15 82) (Z34 317)   · Lives with family   · Living Situation: Supportive and safe   · No advance directives (V49 89) (Z78 9)    Current Meds    1  Fluticasone Propionate 50 MCG/ACT Nasal Suspension; use 2 sprays in each nostril     once daily; Therapy: 14Qju2580 to (Evaluate:64Rnq6159)  Requested for: 45Mjf1956; Last     Rx:91Mmy3092 Ordered   2  Metoprolol Succinate ER 25 MG Oral Tablet Extended Release 24 Hour; TAKE 1/2     TABLET DAILY; Therapy: 61Ydu5988 to (Evaluate:29Oct2018)  Requested for: 66HGX5565; Last     Rx:03Nov2017 Ordered    Allergies   1   No Known Drug Allergies    Vitals   Signs   Recorded: 30NVT1255 10:05AM   Heart Rate: 78  Respiration: 14  Systolic: 394  Diastolic: 68  Height: 5 ft 6 in  Weight: 139 lb   BMI Calculated: 22 44  BSA Calculated: 1 71  O2 Saturation: 97    Physical Exam        Constitutional      General Appearance: Appears appropriate for age, healthy, well developed, appropriately groomed and appropriately dressed           Neck      Neck and thyroid: Abnormal  -- left neck pain and right occipital notch tenderness to mild palpation  Musculoskeletal      Gait and Station: Walks with normal gait  Tandem walk test is normal  Romberg's test is negative  Gait evaluation demonstrated a normal gait  Muscle strength: Normal strength throughout  Muscle tone: No atrophy, abnormal movements, flaccidity, cogwheeling or spasticity  Range of motion: Normal      Stability: Normal          Neurologic      Mental Status: Mood is normal  Affect is normal  Memory is intact  (Concentration) No apparent agnosia present  Thought process appears clear and appropriate  -- (pleasant)      Attention span and concentration: Normal thought process and attention span  Language: Names objects, able to repeat phrases and speaks spontaneously  Fund of knowledge: Normal vocabulary with appropriate knowledge of current events and past history  Sensation: Intact sensation to pinprick, temperature, vibration, and proprioception in all four extremities  Reflexes: DTR's are normal and symmetric bilaterally  Babinski's reflex is negative bilaterally  No pathologic ankle clonus  Deep tendon reflexes: 1+ right ankle jerk-- and-- 1+ left ankle jerk2+ right biceps,-- 2+ left biceps,-- 2+ right brachioradialis,-- 2+ left brachioradialis,-- 2+ right patella-- and-- 2+ left patella  Coordination: Cerebellum function intact  No involuntary movement or psychomotor activity  Motor System: No pronator drift  Cranial Nerve Exam      II: Normal with no deficit  III,IV, VI: Normal with no deficit  V: Normal with no deficit  VII: Normal with no deficit  VIII: Normal with no deficit  IX: Normal with no deficit  X: Normal with no deficit  XI: Normal with no deficit  XII: Normal with no deficit  Recent and remote memory: Intact  Mood and affect: Normal        Attending Note   Collaborating Physician Note: Collaborating Note: I supervised the Advanced Practitioner-- and-- I agree with the Advanced Practitioner note  Future Appointments      Date/Time Provider Specialty Site   02/22/2018 08:30 AM Debra Ortiz DO Internal Medicine [facility] INTERNAL MED   02/28/2018 11:15 AM Azeem Fuller [facility]     Signatures    Electronically signed by :  Abebe Hartman [facility]; Jan 7 2018  3:46PM EST                       (Author)     Electronically signed by : Leida Hughes MD; Jan 23 2018  4:06PM EST                       (Author)

## 2018-01-24 ENCOUNTER — TRANSCRIBE ORDERS (OUTPATIENT)
Dept: FAMILY MEDICINE CLINIC | Facility: HOSPITAL | Age: 23
End: 2018-01-24

## 2018-01-24 VITALS
HEIGHT: 66 IN | SYSTOLIC BLOOD PRESSURE: 134 MMHG | WEIGHT: 135 LBS | HEART RATE: 108 BPM | RESPIRATION RATE: 16 BRPM | BODY MASS INDEX: 21.69 KG/M2 | DIASTOLIC BLOOD PRESSURE: 78 MMHG

## 2018-01-24 VITALS
SYSTOLIC BLOOD PRESSURE: 110 MMHG | BODY MASS INDEX: 22.34 KG/M2 | DIASTOLIC BLOOD PRESSURE: 68 MMHG | HEART RATE: 78 BPM | RESPIRATION RATE: 14 BRPM | HEIGHT: 66 IN | OXYGEN SATURATION: 97 % | WEIGHT: 139 LBS

## 2018-01-24 DIAGNOSIS — M54.81 OCCIPITAL NEURALGIA OF RIGHT SIDE: Primary | ICD-10-CM

## 2018-01-29 ENCOUNTER — TELEPHONE (OUTPATIENT)
Dept: NEUROLOGY | Facility: CLINIC | Age: 23
End: 2018-01-29

## 2018-01-29 DIAGNOSIS — S09.90XS HEAD INJURY, SEQUELA: ICD-10-CM

## 2018-01-29 DIAGNOSIS — M54.81 OCCIPITAL NEURALGIA, UNSPECIFIED LATERALITY: ICD-10-CM

## 2018-01-29 DIAGNOSIS — A69.20 ACUTE LYME DISEASE: Primary | ICD-10-CM

## 2018-01-29 NOTE — TELEPHONE ENCOUNTER
See "medication problem,"    I am not sure if he means indocin or nortriptyline not effective  Please ask if he completed bw for lyme disease test yet, as discussed in office  Thanks

## 2018-01-31 NOTE — TELEPHONE ENCOUNTER
Patient reports he does not feel as though either medication is helping  He notices no difference with being on the nortriptyline for almost a month and the indocin is not beneficial when he takes it   He reports labs were completed almost 3 weeks ago through [address] [place] [address]  He is also questioning if you were able to review the MRI he dropped off on Monday

## 2018-01-31 NOTE — TELEPHONE ENCOUNTER
I am sorry we did not get that blood work forwarded to us  Mei Rutherford- could you please find this lab harish lyme test for me? Thanks

## 2018-02-02 NOTE — TELEPHONE ENCOUNTER
Lyme still positive in terms of Igg and Igm  Dr Deshawn Aden to [place] repeat brain MRI W and WO contrast to assess for CSF lyme  We may need to order LP  We will be in touch after the MRI  Also please reschedule him with DR VELA and cancel with me on 2/20/18  Just transfer his appt from my schedule to Dr Bryan Esparza  Thanks

## 2018-02-06 NOTE — TELEPHONE ENCOUNTER
Patient made aware  Please enter order for MRI and have  contact him  Did you want the patient seen at the same time on the same day with Dr Suzie Rivero? Please have  switch this over when the call to schedule MRI

## 2018-02-07 NOTE — TELEPHONE ENCOUNTER
Please call patient and schedule MRI Brain  Please also try to reschedule his apt with Dr VELA same day and time if she can   If not reschedule closer to th eappt date

## 2018-02-07 NOTE — TELEPHONE ENCOUNTER
Please schedule jarod mri  Please reschedule with Dr Oneal Medina same day/ time if available, if not can reschedule on closest day  Thanks

## 2018-02-07 NOTE — TELEPHONE ENCOUNTER
Spoke with patient  The MRI is scheduled for 2-21-18 at 8:30pm at [facility]   The pt  Is also scheduled with Dr Gabrielle Gonzalez on 3-13-18 at 11:30am   The pt  Did have questions concerning a spinal tap

## 2018-02-21 ENCOUNTER — HOSPITAL ENCOUNTER (OUTPATIENT)
Dept: MRI IMAGING | Facility: HOSPITAL | Age: 23
Discharge: HOME | End: 2018-02-21
Payer: COMMERCIAL

## 2018-02-21 DIAGNOSIS — S09.90XS HEAD INJURY, SEQUELA: ICD-10-CM

## 2018-02-21 DIAGNOSIS — A69.20 ACUTE LYME DISEASE: ICD-10-CM

## 2018-02-21 DIAGNOSIS — M54.81 OCCIPITAL NEURALGIA, UNSPECIFIED LATERALITY: ICD-10-CM

## 2018-02-21 PROCEDURE — 70553 MRI BRAIN STEM W/O & W/DYE: CPT

## 2018-02-21 PROCEDURE — A9585 GADOBUTROL INJECTION: HCPCS | Performed by: PHYSICIAN ASSISTANT

## 2018-02-21 RX ADMIN — GADOBUTROL 6 ML: 604.72 INJECTION INTRAVENOUS at 07:35

## 2018-02-22 ENCOUNTER — OFFICE VISIT (OUTPATIENT)
Dept: FAMILY MEDICINE CLINIC | Facility: HOSPITAL | Age: 23
End: 2018-02-22
Payer: COMMERCIAL

## 2018-02-22 VITALS
HEIGHT: 66 IN | WEIGHT: 136 LBS | OXYGEN SATURATION: 95 % | SYSTOLIC BLOOD PRESSURE: 110 MMHG | DIASTOLIC BLOOD PRESSURE: 78 MMHG | BODY MASS INDEX: 21.86 KG/M2 | HEART RATE: 99 BPM

## 2018-02-22 DIAGNOSIS — R51.9 HEADACHE DISORDER: Primary | ICD-10-CM

## 2018-02-22 DIAGNOSIS — J30.89 CHRONIC NON-SEASONAL ALLERGIC RHINITIS, UNSPECIFIED TRIGGER: ICD-10-CM

## 2018-02-22 DIAGNOSIS — F07.81 POSTCONCUSSIVE SYNDROME: ICD-10-CM

## 2018-02-22 DIAGNOSIS — A69.20 LYME DISEASE: ICD-10-CM

## 2018-02-22 PROBLEM — J30.9 ALLERGIC RHINITIS: Status: ACTIVE | Noted: 2017-09-11

## 2018-02-22 PROBLEM — R00.2 PALPITATION: Chronic | Status: ACTIVE | Noted: 2018-02-22

## 2018-02-22 PROBLEM — M54.81 OCCIPITAL NEURALGIA OF RIGHT SIDE: Status: ACTIVE | Noted: 2017-09-19

## 2018-02-22 PROBLEM — H02.409 PTOSIS OF EYELID: Status: ACTIVE | Noted: 2017-10-26

## 2018-02-22 PROBLEM — IMO0002 CHRONIC MIGRAINE: Status: ACTIVE | Noted: 2018-01-04

## 2018-02-22 PROCEDURE — 99214 OFFICE O/P EST MOD 30 MIN: CPT | Performed by: INTERNAL MEDICINE

## 2018-02-22 RX ORDER — INDOMETHACIN 25 MG/1
25 CAPSULE ORAL DAILY
COMMUNITY
Start: 2018-01-04 | End: 2018-03-13 | Stop reason: ALTCHOICE

## 2018-02-22 RX ORDER — NORTRIPTYLINE HYDROCHLORIDE 10 MG/1
10 CAPSULE ORAL DAILY
COMMUNITY
Start: 2018-01-04 | End: 2018-03-13 | Stop reason: ALTCHOICE

## 2018-02-22 RX ORDER — METOPROLOL SUCCINATE 25 MG/1
0.5 TABLET, EXTENDED RELEASE ORAL DAILY
COMMUNITY
Start: 2017-08-21 | End: 2018-07-19

## 2018-02-22 NOTE — ASSESSMENT & PLAN NOTE
Had recent pulse rates to 120 at rest-lasted 10-15 minutes- denies  Feeling anxious  Had walked to mailbox before that without symptoms     on low dose metoprolol-25 mg- 1/2 tab dialy- whole tab had dropped bp in past   Previously see n by cardiology and had holter

## 2018-02-22 NOTE — ASSESSMENT & PLAN NOTE
Had mri of brain yesterday- reading is normal- some consideration on their notes for possible spinal tap for lyme if indicated

## 2018-02-22 NOTE — PATIENT INSTRUCTIONS
followup with neurology about mri and possible spinal tap need   if you have palpitations again- call and will consult with cardiology- had month long event monitor

## 2018-02-22 NOTE — PROGRESS NOTES
Assessment/Plan:    Headache disorder  Had mri of brain yesterday- reading is normal- some consideration on their notes for possible spinal tap for lyme if indicated    Allergic rhinitis  No recent flare of symptoms- uses prn antihistamines    Palpitation  Had recent pulse rates to 120 at rest-lasted 10-15 minutes- denies  Feeling anxious  Had walked to mailbox before that without symptoms  on low dose metoprolol-25 mg- 1/2 tab dialy- whole tab had dropped bp in past   Previously see n by cardiology and had holter    Postconcussive syndrome  Still some concentration problems with constant pain  Reports fairly good sleep 7 hours    Lyme disease  Had seen infectious disease  Nov 2017         Problem List Items Addressed This Visit        Respiratory    Allergic rhinitis     No recent flare of symptoms- uses prn antihistamines            Nervous and Auditory    Postconcussive syndrome     Still some concentration problems with constant pain  Reports fairly good sleep 7 hours         Relevant Medications    nortriptyline (PAMELOR) 10 mg capsule       Other    Lyme disease (Chronic)     Had seen infectious disease  Nov 2017         Headache disorder - Primary     Had mri of brain yesterday- reading is normal- some consideration on their notes for possible spinal tap for lyme if indicated         Relevant Medications    indomethacin (INDOCIN) 25 mg capsule    metoprolol succinate (TOPROL-XL) 25 mg 24 hr tablet    nortriptyline (PAMELOR) 10 mg capsule            Subjective:      Patient ID: Vilma Alberto  is a 20 y o  male  1 ongoing issues with  Headache, facial pain and occipital pain- being seen also by neurology- working part time- works 16- 20 hours per week- has applied for social security- worse symptoms with heavy lifting or bending over  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 so unable to do this job-- had tried for a couple of months but unsuccessful     Other issues see vianca notes above        The following portions of the patient's history were reviewed and updated as appropriate: past family history, past social history and past surgical history  Review of Systems   Constitutional: Negative for chills and fever  Cardiovascular: Positive for palpitations  All other systems reviewed and are negative  Objective:      /78 (BP Location: Left arm, Patient Position: Sitting, Cuff Size: Adult)   Pulse 99   Ht 5' 6" (1 676 m)   Wt 61 7 kg (136 lb)   SpO2 95%   BMI 21 95 kg/m²          Physical Exam   Constitutional: He appears well-developed and well-nourished  HENT:   Head: Normocephalic and atraumatic  Eyes: Right eye exhibits no discharge  Left eye exhibits no discharge  No scleral icterus  Neck: Normal range of motion  No JVD present  Cardiovascular: Exam reveals no friction rub  No murmur heard  Pulmonary/Chest: No stridor  No respiratory distress  He has no wheezes  Abdominal: Soft  Bowel sounds are normal    Musculoskeletal: He exhibits no edema  Having some tenderness in anterior left neck   Neurological: He is alert  Tenderness at right occipital base and edge of left lower jaw up to side of left ear- no rashes or erythema   Skin: Skin is warm and dry  No erythema  Nursing note and vitals reviewed

## 2018-03-13 ENCOUNTER — OFFICE VISIT (OUTPATIENT)
Dept: NEUROLOGY | Facility: CLINIC | Age: 23
End: 2018-03-13
Payer: COMMERCIAL

## 2018-03-13 VITALS
HEART RATE: 80 BPM | HEIGHT: 66 IN | SYSTOLIC BLOOD PRESSURE: 110 MMHG | BODY MASS INDEX: 21.86 KG/M2 | DIASTOLIC BLOOD PRESSURE: 80 MMHG | WEIGHT: 136 LBS | RESPIRATION RATE: 14 BRPM

## 2018-03-13 DIAGNOSIS — G44.86 CERVICOGENIC HEADACHE: Primary | ICD-10-CM

## 2018-03-13 DIAGNOSIS — M54.81 OCCIPITAL NEURALGIA OF RIGHT SIDE: ICD-10-CM

## 2018-03-13 PROCEDURE — 99214 OFFICE O/P EST MOD 30 MIN: CPT | Performed by: PSYCHIATRY & NEUROLOGY

## 2018-03-13 RX ORDER — KETOROLAC TROMETHAMINE 10 MG/1
TABLET, FILM COATED ORAL
Qty: 10 TABLET | Refills: 1 | Status: SHIPPED | OUTPATIENT
Start: 2018-03-13 | End: 2018-07-19

## 2018-03-13 RX ORDER — DULOXETIN HYDROCHLORIDE 20 MG/1
CAPSULE, DELAYED RELEASE ORAL
Qty: 60 CAPSULE | Refills: 5 | Status: SHIPPED | OUTPATIENT
Start: 2018-03-13 | End: 2018-07-19

## 2018-03-13 NOTE — PROGRESS NOTES
Patient ID: Jeremiah Jasso  is a 20 y o  male  Assessment/Plan:    No problem-specific Assessment & Plan notes found for this encounter  Diagnoses and all orders for this visit:    Cervicogenic headache- since work injury to neck  -     ketorolac (TORADOL) 10 mg tablet; Take 1 tabs at onset of severe headache, can repeat X1 in 6-8 hours  Take with food/milk/antacid  -     DULoxetine (CYMBALTA) 20 mg capsule; Take 1 tab qhs x 1 week, then 1 tab BID- increase as tolerated  -     CT spine cervical wo contrast; Future  - CTA head/neck and soft tissue neck negative    Occipital neuralgia of right side  -     DULoxetine (CYMBALTA) 20 mg capsule; Take 1 tab qhs x 1 week, then 1 tab BID- increase as tolerated  -     CT spine cervical wo contrast; Future    Reviewed MRI brain w/w/o contrast in lieu of history Lyme disease s/p antibiotic treatment- MRI brain was negative with no enhancement and neurologic exam non focal thus do not suspect CNS lyme at this time  He has seen ID and cleared from their standpoint as well  Subjective:    HPI     Mr Gabby Rivers is seen in follow up for right occipital neuralgia and left sided neck pain since 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  He tells me he still has the pain daily- with some days worse than the other  States today 5-6/10 and yesterday 8/10  States if he is resting it will drop down to a moderate severity  No photophobia, phonophobia, nausea, emesis, vertigo, vision or speech or cognition changes with headache/neck pain  States activity of any type makes it worse- states heavy lifting makes it worse  States he lifts in excess of a 100 lbs  He continues to work with no weakness or sensory loss noted  States his hearing in his left ear is more muffled- has seen an ENT with no problems noted per them  Medications tried and failed: tizanidine, nortriptyline, gabapentin- initially helped but wore off- no a/e  He has failed indocin as well      He is trying olive leaf extract per his chiropractor and he tells me this helps with his neck pain  He states the severity of his pain is reduced since starting this  He is also taking Magnesium now  States anxiety stable  States heart palpitations stable on metoprolol    The following portions of the patient's history were reviewed and updated as appropriate: allergies, current medications, past family history, past medical history, past social history, past surgical history and problem list          Objective:    Blood pressure 110/80, pulse 80, resp  rate 14, height 5' 6" (1 676 m), weight 61 7 kg (136 lb)  Physical Exam   Constitutional: He appears well-developed and well-nourished  HENT:   Head: Normocephalic and atraumatic  Eyes: Conjunctivae and EOM are normal  Pupils are equal, round, and reactive to light  Neck: Normal range of motion  Neck supple  Cardiovascular: Normal rate and regular rhythm  Pulmonary/Chest: Effort normal    Musculoskeletal: Normal range of motion  Neurological: He has normal strength and normal reflexes  Gait and coordination normal    Nursing note and vitals reviewed  Neurological Exam    Mental Status  The patient is alert and oriented to person, place, time, and situation  His recent and remote memory are normal  He has no dysarthria  He is able to name object, read and repeat  He has normal attention span and concentration  He follows multi-step commands  He has a normal fund of knowledge  Cranial Nerves    CN II: The patient's visual acuity and visual fields are normal   CN III, IV, VI: The patient's pupils are equally round and reactive to light and ocular movements are normal   CN V: The patient has normal facial sensation  CN VII:  The patient has symmetric facial movement  CN VIII:  The patient's hearing is normal   CN IX, X: The patient has symmetric palate movement and normal gag reflex    CN XI: The patient's shoulder shrug strength is normal   CN XII: The patient's tongue is midline without atrophy or fasciculations  Motor  The patient has normal muscle bulk throughout  His overall muscle tone is normal throughout  His strength is 5/5 throughout all four extremities  Sensory  The patient's sensation is normal in all four extremities to light touch, temperature and vibration  He has no right-sided and no left-sided hemispatial neglect  Reflexes  Deep tendon reflexes are 2+ and symmetric in all four extremities with downgoing toes bilaterally  Gait and Coordination  The patient has normal gait and station  He has normal tandem gait  Romberg's sign is negative  He has normal coordination bilaterally  ROS:    Review of Systems   Constitutional: Negative  HENT: Negative  Eyes: Negative  Respiratory: Negative  Cardiovascular: Negative  Gastrointestinal: Negative  Endocrine: Negative  Genitourinary: Negative  Musculoskeletal: Negative  Skin: Negative  Allergic/Immunologic: Negative  Neurological: Positive for numbness and headaches  Hematological: Negative  Psychiatric/Behavioral: Negative

## 2018-04-17 ENCOUNTER — TELEPHONE (OUTPATIENT)
Dept: FAMILY MEDICINE CLINIC | Facility: HOSPITAL | Age: 23
End: 2018-04-17

## 2018-04-19 ENCOUNTER — TELEPHONE (OUTPATIENT)
Dept: NEUROLOGY | Facility: CLINIC | Age: 23
End: 2018-04-19

## 2018-04-19 NOTE — TELEPHONE ENCOUNTER
Patient is requesting a work note with restrictions on how many hours he works  States his job has him working 10 hour shifts on the weekends, and is causing increased neck and head pain  States he is working 3/4 weekends per month  States his job says that since there is no work note they are able to force patient to work long hours on the weekends  He says he only works 4-5 hours at a time during the week and he is okay with this  Requesting for note to have him only work 4-5 hours at a time max       He stated he asked his PCP and they told him to contact neurology for request

## 2018-04-19 NOTE — LETTER
April 24, 2018    Patient: Wilhelminia Osler  YOB: 1995   Date of Visit: 4/19/2018       To Whom It May Concern: It is my medical opinion that La Gage may return to light duty immediately with the following restrictions: he may work for up to or fewer than 5 hours in a 24 hour period  If you have any questions or concerns, please don't hesitate to call           Sincerely,        Paurl Roger RN    CC: No Recipients

## 2018-04-19 NOTE — TELEPHONE ENCOUNTER
Okay to generate letter working 4-5 hours maximum at a time in a day  Please generate the letter and Hilario Cueto okay to sign, if needed CC me and I can sign as well      Thanks

## 2018-04-19 NOTE — LETTER
April 24, 2018     Patient: Bere Corral  YOB: 1995   Date of Visit: 4/19/2018       To Whom It May Concern: It is my medical opinion that Mak Hernandez may return to light duty immediately with the following restrictions: he may work for up to or fewer than 5 hours in a 24 hour period  If you have any questions or concerns, please don't hesitate to call  Sincerely,        Miracle Crews PA-C    CC: No Recipients

## 2018-04-23 NOTE — TELEPHONE ENCOUNTER
Patient called, inquiring about the letter for work, he is requesting to  letter at [place] office, please call him when he can

## 2018-04-24 NOTE — TELEPHONE ENCOUNTER
Printed & signed, letter placed up front for pt to  at the [facility]

## 2018-04-24 NOTE — TELEPHONE ENCOUNTER
Called patient to inform him the letter is ready  States he will  tomorrow at [facility]

## 2018-04-25 NOTE — TELEPHONE ENCOUNTER
Pt  Picked up work note at the front window at the formerly [facility] location  Pt  Signed copy of photo id with date and time of   It was then sent to centralized faxing

## 2018-04-27 NOTE — TELEPHONE ENCOUNTER
mc from pt's [employer] called to clarify if pt is ok to lift and drive  ok to give vebal to employeer      [phone]

## 2018-06-27 ENCOUNTER — OFFICE VISIT (OUTPATIENT)
Dept: FAMILY MEDICINE CLINIC | Facility: HOSPITAL | Age: 23
End: 2018-06-27
Payer: COMMERCIAL

## 2018-06-27 VITALS
OXYGEN SATURATION: 99 % | WEIGHT: 138.5 LBS | BODY MASS INDEX: 22.26 KG/M2 | SYSTOLIC BLOOD PRESSURE: 120 MMHG | HEART RATE: 78 BPM | DIASTOLIC BLOOD PRESSURE: 70 MMHG | TEMPERATURE: 96.4 F | HEIGHT: 66 IN

## 2018-06-27 DIAGNOSIS — M54.81 OCCIPITAL NEURALGIA OF RIGHT SIDE: ICD-10-CM

## 2018-06-27 DIAGNOSIS — IMO0002 CHRONIC MIGRAINE: Primary | ICD-10-CM

## 2018-06-27 DIAGNOSIS — F07.81 POSTCONCUSSIVE SYNDROME: ICD-10-CM

## 2018-06-27 PROCEDURE — 99214 OFFICE O/P EST MOD 30 MIN: CPT | Performed by: INTERNAL MEDICINE

## 2018-06-27 NOTE — PATIENT INSTRUCTIONS
See neurology   see pain management - Dr Gail Gonzalez  Continue daily stretches for neck  And shoulders

## 2018-06-27 NOTE — PROGRESS NOTES
Assessment/Plan:  Await repeat neurology evaluation- consider for cognitive therapy given perisistent symptoms of memory, concentration issues  Will refer back to Dr Karen Murphy - pain management for ? Of trigger  point injections  Consider seeking  opportunities at [facility] for training  Problem List Items Addressed This Visit        Cardiovascular and Mediastinum    Chronic migraine - Primary     Has occasional vision issues- wears sunglasses to drive which has helped somewhat  Feels chronic puffy feeling on left ear and top of head on left and extending to neck on left  To see neurology - Dr Enid Suazo from [facility]-again  next month to discuss            Nervous and Auditory    Postconcussive syndrome     Having some memory issues - needs to be reminded at times of what he was to do next- no real change in past year  Will take pictures of things to remind him of things to do  Never had cognitive therapy  Other    Occipital neuralgia of right side     Being followed by neurology for this                 Subjective:      Patient ID: Radha Rodriguez  is a 20 y o  male    1  Post concussion- hx of 2 head related injuries at work- initially hit by material then saw stars- had second injury where he hit head on bar  Now only working 4 hours per day and feels fatigued with that  He was denied by work and ssi disability  Still feels foggy in head and had pain intermittently severe- may last all day at times  Did not notice help with cymbalta and toradol but no real change after 2 1/2 months of treatment so he stopped the medication  Had used Tylenol inpast but no real help  The following portions of the patient's history were reviewed and updated as appropriate: allergies, current medications and problem list      Review of Systems   Constitutional: Positive for fatigue  Negative for fever  Neurological: Positive for tremors          Feels shaky at times- had some difficulty writing a some times - is left handed   All other systems reviewed and are negative  Objective:      Current Outpatient Prescriptions:     metoprolol succinate (TOPROL-XL) 25 mg 24 hr tablet, Take 0 5 tablets by mouth daily, Disp: , Rfl:     DULoxetine (CYMBALTA) 20 mg capsule, Take 1 tab qhs x 1 week, then 1 tab BID- increase as tolerated, Disp: 60 capsule, Rfl: 5    ketorolac (TORADOL) 10 mg tablet, Take 1 tabs at onset of severe headache, can repeat X1 in 6-8 hours  Take with food/milk/antacid , Disp: 10 tablet, Rfl: 1         Physical Exam   Constitutional: He appears well-developed and well-nourished  HENT:   Head: Normocephalic and atraumatic  Right Ear: External ear normal    Left Ear: External ear normal    Mouth/Throat: Oropharynx is clear and moist  No oropharyngeal exudate  Eyes: EOM are normal  Pupils are equal, round, and reactive to light  Right eye exhibits no discharge  Left eye exhibits no discharge  Neck: Neck supple  Some trigger point tenderness on right occipital base and left shoulder and clavicular region   Cardiovascular: Normal rate and regular rhythm  Exam reveals no gallop  No murmur heard  Pulmonary/Chest: Effort normal and breath sounds normal  No respiratory distress  He has no wheezes  Abdominal: Bowel sounds are normal    Musculoskeletal: He exhibits no edema or deformity  Neurological: He is alert  No cranial nerve deficit  He exhibits normal muscle tone  Skin: Skin is warm and dry  Nursing note and vitals reviewed

## 2018-06-27 NOTE — ASSESSMENT & PLAN NOTE
Having some memory issues - needs to be reminded at times of what he was to do next- no real change in past year  Will take pictures of things to remind him of things to do  Never had cognitive therapy

## 2018-06-27 NOTE — ASSESSMENT & PLAN NOTE
Has occasional vision issues- wears sunglasses to drive which has helped somewhat  Feels chronic puffy feeling on left ear and top of head on left and extending to neck on left   To see neurology - Dr Dang Link from [facility] [place] campus-again  next month to discuss

## 2018-07-11 ENCOUNTER — TRANSCRIBE ORDERS (OUTPATIENT)
Dept: NEUROLOGY | Facility: CLINIC | Age: 23
End: 2018-07-11

## 2018-07-11 DIAGNOSIS — R51.9 HEADACHE: Primary | ICD-10-CM

## 2018-07-11 DIAGNOSIS — M54.81 OCCIPITAL NEURALGIA: ICD-10-CM

## 2018-07-13 ENCOUNTER — OFFICE VISIT (OUTPATIENT)
Dept: NEUROLOGY | Facility: CLINIC | Age: 23
End: 2018-07-13
Payer: COMMERCIAL

## 2018-07-13 VITALS
SYSTOLIC BLOOD PRESSURE: 110 MMHG | DIASTOLIC BLOOD PRESSURE: 68 MMHG | HEIGHT: 66 IN | BODY MASS INDEX: 21.83 KG/M2 | RESPIRATION RATE: 16 BRPM | WEIGHT: 135.8 LBS | HEART RATE: 98 BPM

## 2018-07-13 DIAGNOSIS — A69.20 LYME DISEASE: ICD-10-CM

## 2018-07-13 DIAGNOSIS — R51.9 HEADACHE: ICD-10-CM

## 2018-07-13 DIAGNOSIS — M54.81 OCCIPITAL NEURALGIA: ICD-10-CM

## 2018-07-13 DIAGNOSIS — M79.18 MYOFASCIAL MUSCLE PAIN: Primary | ICD-10-CM

## 2018-07-13 PROCEDURE — 99213 OFFICE O/P EST LOW 20 MIN: CPT | Performed by: PHYSICIAN ASSISTANT

## 2018-07-13 RX ORDER — BACLOFEN 10 MG/1
TABLET ORAL
Qty: 30 TABLET | Refills: 2 | Status: SHIPPED | OUTPATIENT
Start: 2018-07-13 | End: 2018-09-25 | Stop reason: SDDI

## 2018-07-13 NOTE — PROGRESS NOTES
Patient ID: Noemy Dyer  is a 20 y o  male  Assessment/Plan:    No problem-specific Assessment & Plan notes found for this encounter  Diagnoses and all orders for this visit:    Myofascial muscle pain  -     baclofen 10 mg tablet; 1 qhs and up to TID as tolerated  -     Ambulatory referral to Physical Medicine Rehab; Future  -     Ambulatory referral to Infectious Disease; Future    Headache  -     Ambulatory referral to Neurology    Occipital neuralgia  -     Ambulatory referral to Neurology    Lyme disease  -     Ambulatory referral to Infectious Disease; Future          Negative for CNS Lyme, cleared by ID  He agreed to try baclofen, TPIs with Dr Gunjan Henderson  ?RSD, if this is the case we would not want him to have TPIs  Pt would like a second ID opinion  He plans to contact ID at [facility] since he has some connections there  Subjective:    HPI    Junie Jones  Presents for neurological f/u for headaches and myofascial pain s/p work related injury  He is currently interviewing for a different position, he is hoping the new job will not necessitate excessive lifting or lifting over 100 lbs  Pain is right occipital region: ?occipital neuralgia and left sided neck, which radiates into the left SCM and left anterior chest  Reports sometimes SOB or heavy breathing when lying flat  This is transient, goes away after a few seconds- minutes  Denies speech changes  Denies dysphagia  Work injury occurred with 16 foot lumber pipes; hit to the chin and whiplash injury of the neck, neck whipped backwards or extended  He tells me he still has the pain daily- with some days worse than the other  On average pain is 5-6/10  States if he is resting it will drop down to a moderate severity  No photophobia, phonophobia, nausea, emesis, vertigo, vision or speech or cognition changes with headache/neck pain  States activity of any type makes it worse- states heavy lifting makes it worse  States he lifts in excess of a 100 lbs  He continues to work with no weakness or sensory loss noted  States his hearing in his left ear is more muffled- has seen an ENT with no problems noted per them  Medications tried and failed: tizanidine, nortriptyline, gabapentin- initially helped but wore off- no a/e  He has failed indocin as well      He is trying olive leaf extract per his chiropractor and he tells me this helps with his neck pain  He states the severity of his pain is reduced since starting this  He is also taking Magnesium now  States anxiety stable  States heart palpitations stable on metoprolol  +Lyme found but no CNS Lyme per ID and cleared by ID   ---    The following portions of the patient's history were reviewed and updated as appropriate: He  has a past medical history of concussion; Hypertension; and Postconcussive syndrome (12/14/2017)  He   Patient Active Problem List    Diagnosis Date Noted    Myofascial muscle pain 07/13/2018    Palpitation 02/22/2018    Chronic migraine 01/04/2018    Postconcussive syndrome 12/14/2017    Ptosis of eyelid 10/26/2017    Occipital neuralgia of right side 09/19/2017    Headache disorder 09/19/2017    Allergic rhinitis 09/11/2017    Lyme disease 08/01/2017     He  has a past surgical history that includes Dental surgery  His family history includes COPD in his father  He  reports that he has quit smoking  His smoking use included Cigars  He has quit using smokeless tobacco  He reports that he does not drink alcohol or use drugs  Current Outpatient Prescriptions   Medication Sig Dispense Refill    baclofen 10 mg tablet 1 qhs and up to TID as tolerated 30 tablet 2     No current facility-administered medications for this visit  No current outpatient prescriptions on file prior to visit  No current facility-administered medications on file prior to visit  He has No Known Allergies            Objective:    Blood pressure 110/68, pulse 98, resp  rate 16, height 5' 6" (1 676 m), weight 61 6 kg (135 lb 12 8 oz)  Physical Exam   Constitutional: He is oriented to person, place, and time  He appears well-developed and well-nourished  HENT:   Head: Normocephalic and atraumatic  Neck: Normal range of motion  Neck supple  ttp left neck, ?cervical paraspinals and cervicis splenius region, b/l suboccipital regions   Musculoskeletal: Normal range of motion  5/5 t/o  No pronator drift  Neurological: He is alert and oriented to person, place, and time  He displays normal reflexes  No cranial nerve deficit  Coordination normal    Temperature sens is intact in all 4 extremities  Gait is steady  Psychiatric: He has a normal mood and affect  His behavior is normal  Judgment and thought content normal    Nursing note and vitals reviewed  Neurological Exam      ROS:    Review of Systems   Constitutional: Positive for fatigue  Negative for appetite change and fever  HENT: Negative  Negative for hearing loss, tinnitus, trouble swallowing and voice change  Eyes: Negative  Negative for photophobia and pain  Respiratory: Positive for shortness of breath  Cardiovascular: Positive for palpitations  Gastrointestinal: Negative  Negative for nausea and vomiting  Endocrine: Negative  Negative for cold intolerance and heat intolerance  Genitourinary: Negative  Negative for dysuria, frequency and urgency  Musculoskeletal: Positive for neck pain  Negative for myalgias  Skin: Negative  Negative for rash  Neurological: Positive for dizziness, light-headedness, numbness (Left arm and leg) and headaches  Negative for tremors, seizures, syncope, facial asymmetry, speech difficulty and weakness  Hematological: Negative  Does not bruise/bleed easily  Psychiatric/Behavioral: Positive for confusion and sleep disturbance  Negative for hallucinations       Review of systems, Past medical history, Surgical history, Family history, Social history and Medication history were reviewed and otherwise unremarkable from a neurological perspective

## 2018-07-18 ENCOUNTER — TRANSCRIBE ORDERS (OUTPATIENT)
Dept: FAMILY MEDICINE CLINIC | Facility: HOSPITAL | Age: 23
End: 2018-07-18

## 2018-07-18 DIAGNOSIS — M79.10 MYALGIA: Primary | ICD-10-CM

## 2018-07-19 ENCOUNTER — OFFICE VISIT (OUTPATIENT)
Dept: NEUROLOGY | Facility: CLINIC | Age: 23
End: 2018-07-19
Payer: COMMERCIAL

## 2018-07-19 VITALS
HEIGHT: 66 IN | RESPIRATION RATE: 12 BRPM | WEIGHT: 136.3 LBS | BODY MASS INDEX: 21.9 KG/M2 | DIASTOLIC BLOOD PRESSURE: 66 MMHG | SYSTOLIC BLOOD PRESSURE: 117 MMHG | HEART RATE: 73 BPM

## 2018-07-19 DIAGNOSIS — M79.18 MYOFASCIAL MUSCLE PAIN: Primary | ICD-10-CM

## 2018-07-19 PROCEDURE — 20553 NJX 1/MLT TRIGGER POINTS 3/>: CPT | Performed by: PHYSICAL MEDICINE & REHABILITATION

## 2018-07-19 RX ORDER — BUPIVACAINE HYDROCHLORIDE 2.5 MG/ML
5 INJECTION, SOLUTION INFILTRATION; PERINEURAL ONCE
Status: COMPLETED | OUTPATIENT
Start: 2018-07-19 | End: 2018-07-19

## 2018-07-19 RX ADMIN — BUPIVACAINE HYDROCHLORIDE 5 ML: 2.5 INJECTION, SOLUTION INFILTRATION; PERINEURAL at 12:40

## 2018-07-19 NOTE — PROGRESS NOTES
Inj Trigger Point Single/Multiple     Date/Time 7/19/2018 1:27 PM     Performed by  Itzel Whatley     Authorized by Itzel Whatley       Consent: Verbal consent obtained  Written consent obtained  Consent given by: patient  Patient understanding: patient states understanding of the procedure being performed  Patient consent: the patient's understanding of the procedure matches consent given  Site marked: the operative site was marked  Patient identity confirmed: verbally with patient      Preparation: Patient was prepped and draped in the usual sterile fashion  Site preparation: Betadine    Local anesthesia used: no     Anesthesia   Local anesthesia used: no     Sedation   Patient sedated: no      Specimen: no    Culture: no   Procedure Details   Procedure Notes: Procedure Note:     Procedure: Trigger point injections    Indication:  Right cervical myofascial pain syndrome, left sternocleidomastoid strain       Informed consent was obtained, risks/benefits of procedure and medication were reviewed with the patient, and consent signed by patient prior to procedure and placed in the chart        Trigger and tender points were palpated, identified, and marked in the right upper cervical paraspinal muscle, right trapezius, left sternocleidomastoid    Patient's skin was prepped and cleaned with Betadine and alcohol  Using a 27 gauge 1 inch needle, 0 5cc of 0 25% Marcaine was injected into each of 3 trigger/tender points in the posterior right neck marked muscles identified, and using a 27 gauge 0 5 in needle 0 25 cc of 0 25% Marcaine was injected into 3 areas carefully marked in the left sternocleidomastoid   No complications were experienced and patient tolerated procedure well        Post injection immediate reduction of pain: 40 %     Post procedure instructions were provided with patient understanding                 Briefly this is a 48-year-old right-handed male with past medical history significant for previous concussion, post concussive syndrome, headaches who presents today as a referral from Neurology for evaluation of his chronic posterior right-sided neck pain and left-sided anterior neck pain radiating into the chest   Imaging from last year and 2017 reviewed  Patient has undergone multiple treatments including physical therapy, topical creams, stretching, medications most recently baclofen without much relief  He has had an extensive cardiac workup through his primary care physician as well as his cardiologist additionally without abnormal findings  /66 (BP Location: Left arm, Patient Position: Sitting, Cuff Size: Standard)   Pulse 73   Resp 12   Ht 5' 6" (1 676 m)   Wt 61 8 kg (136 lb 4 8 oz)   BMI 22 00 kg/m²      Focused physical examination:  Cervical range of motion is functional however provocative of pain in rotation as well as lateral bending    Palpation of the right upper trapezius, upper paraspinal musculature cervical located at the cervical occipital junction with exact pain reproduction  Palpation of the left sternocleidomastoid mid muscle reproduces patient's exact pain    Assessment:  Myofascial pain syndrome, cervical and probable left sternocleidomastoid strain/trigger point:  -patient interested in trigger point injections today after verbal as well as written consent and explanation of procedure -trigger points were administered today see procedure note above  -return to clinic in 2 months for evaluation

## 2018-07-19 NOTE — PROGRESS NOTES
Physical Medicine & Rehabilitation New Patient Evaluation  Larry Ramirez  20 y o  male    Referred by:       ASSESSMENT/PLAN:     ***    There are no diagnoses linked to this encounter        Procedure Note:     Procedure: Trigger point injections    Indication: Cervical/Thoracic/Lumbar myofascial pain syndrome       Informed consent was obtained, risks/benefits of procedure and medication were reviewed with the patient, and consent signed by patient prior to procedure and placed in the chart        Trigger and tender points were palpated, identified, and marked (exact location and exact muscles)    Patient's skin was prepped and cleaned with (alcohol, povidone, chlorhexidine)   Using a 25 gauge __ inch needle, 0 5cc of 0 25% Marcaine/ 1% Lidocaine / Saline was injected into each of __ trigger/tender points marked in the muscles identified     No complications were experienced and patient tolerated procedure well        Post injection immediate reduction of pain: __ %     Post procedure instructions were provided with patient understanding  *I have spent *** minutes with {Patient /Family:04309} today in which greater than 50% of this time was spent in counseling/coordination of care regarding {AMB Counseling Topics:6000659969}  HPI:   Larry Ramirez  20 y o  male {RIGHT/LEFT:20294} handed, with  has a past medical history of concussion; Hypertension; and Postconcussive syndrome (12/14/2017)  Old records were reviewed personally  Patient reports pain left sided chest wall palpable pain  Symptoms     Expanded Social History:  Patient lives with {GSliveswith:06351} in a [address] [place] [address] Highway Type:[address]} with *** steps to enter  Works as a  and heavy lifting, part time currently  Driving:  Yes       Function:   Current Level of Function:   {GSFUNCTION:66727}    Prior to level of function:     Functional Goals: ***      SUBJECTIVE:  Patient presents today in the office with chief complaint of ***         Review of Systems:     Review of Systems     OBJECTIVE:   There were no vitals taken for this visit      Physical Exam  Physical Exam  Musculoskeletal: Passive, Active range of motion functional x 4   ***    Neuro:  AAOx3  CNII-XII grossly intact  Sensation to light touch intact  Reflexes:  Coordination:  Gait:   Motor Exam:   Right Left Site Right Left Site     S Ab:  Shoulder Abductors   HF:  Hip Flexors     EF:  Elbow Flexors   H Ab: Hip Abductors     EE:  Elbow Extensors   KF: Knee Flexors     WE:  Wrist Extensors   KE:  Knee Extensors     FF:  Finger Flexors   DR:  Dorsi Flexors     HI:  Hand Intrinsics   EHL: Ext Trevino Longus        PF:  Plantar Flexors       Imaging: I personally reviewed pertinent imaging  Past Medical History:   Diagnosis Date    Hx of concussion     last assessed 3/28/17    Hypertension     Postconcussive syndrome 12/14/2017       Patient Active Problem List    Diagnosis Date Noted    Myofascial muscle pain 07/13/2018    Palpitation 02/22/2018    Chronic migraine 01/04/2018    Postconcussive syndrome 12/14/2017    Ptosis of eyelid 10/26/2017    Occipital neuralgia of right side 09/19/2017    Headache disorder 09/19/2017    Allergic rhinitis 09/11/2017    Lyme disease 08/01/2017       Past Surgical History:   Procedure Laterality Date    DENTAL SURGERY         Family History   Problem Relation Age of Onset    COPD Father        Social History     No Known Allergies      Current Outpatient Prescriptions:     baclofen 10 mg tablet, 1 qhs and up to TID as tolerated, Disp: 30 tablet, Rfl: 2    DULoxetine (CYMBALTA) 20 mg capsule, Take 1 tab qhs x 1 week, then 1 tab BID- increase as tolerated, Disp: 60 capsule, Rfl: 5    ketorolac (TORADOL) 10 mg tablet, Take 1 tabs at onset of severe headache, can repeat X1 in 6-8 hours   Take with food/milk/antacid , Disp: 10 tablet, Rfl: 1    metoprolol succinate (TOPROL-XL) 25 mg 24 hr tablet, Take 0 5 tablets by mouth daily, Disp: , Rfl:

## 2018-07-30 ENCOUNTER — TELEPHONE (OUTPATIENT)
Dept: NEUROLOGY | Facility: CLINIC | Age: 23
End: 2018-07-30

## 2018-07-30 NOTE — TELEPHONE ENCOUNTER
Pt called letting you know that trigger point injections "did not help at all  I'm still having pain in left side of my neck and back of my head"  Not any worse but not better   Pls advise         [phone]

## 2018-07-31 NOTE — TELEPHONE ENCOUNTER
Returned phone call -  Patient continues to have the same pain characteristics with no improvement from trigger point injections  I advised patient to see Cardiology again for chest wall pain symptoms since it has been 1 year, then he should contact pain management for possible block of intercostals or ANGELO C-spine for chest/neck pain respectively  Patient verbalized understanding  We briefly discussed role of Botox injections for possible cervical dystonia with either Dr Chanda Duron or Dr Pedro Pablo Moeller    At this time patient would like to pursue Cardiology/Pain management appointments       -Dr Cedillo Centers

## 2018-07-31 NOTE — TELEPHONE ENCOUNTER
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

## 2018-08-02 NOTE — TELEPHONE ENCOUNTER
Patient called back asking for recommendations  Reviewed below notes, verbalizes understanding  Will follow up with pain management and cardiology

## 2018-08-02 NOTE — TELEPHONE ENCOUNTER
Ok can hold baclofen  Since TPIs and baclofen were not helpful, it may not be a muscle spasm problem  PLease ask him to follow Dr Servando Martinez as noted in this message as next step and keep in touch  Thanks Dr Evelena Eisenmenger!

## 2018-08-20 ENCOUNTER — TELEPHONE (OUTPATIENT)
Dept: NEUROLOGY | Facility: CLINIC | Age: 23
End: 2018-08-20

## 2018-08-20 NOTE — TELEPHONE ENCOUNTER
ILEANA received    This was faxed to [facility] by

## 2018-08-20 NOTE — TELEPHONE ENCOUNTER
Pt called and states that he has been trying to get records faxed to [facility] and [facility] for about a month  He spoke to [facility] and they have not received RAJESH  He will refax rajesh request to alt fax today around 3pm   He will also fax again to [facility]  He had a different fax number for [facility] than what I am aware of  Unsure if that number is correct or not    Will fax to [phone]

## 2018-08-30 ENCOUNTER — TELEPHONE (OUTPATIENT)
Dept: FAMILY MEDICINE CLINIC | Facility: HOSPITAL | Age: 23
End: 2018-08-30

## 2018-08-30 DIAGNOSIS — R68.89 SUSPECTED LYME DISEASE: Primary | ICD-10-CM

## 2018-08-31 LAB
B BURGDOR IGG PATRN SER IB-IMP: POSITIVE
B BURGDOR IGG+IGM SER-ACNC: 2.79 ISR (ref 0–0.9)
B BURGDOR IGM PATRN SER IB-IMP: NEGATIVE
B BURGDOR18KD IGG SER QL IB: PRESENT
B BURGDOR23KD IGG SER QL IB: ABNORMAL
B BURGDOR23KD IGM SER QL IB: PRESENT
B BURGDOR28KD IGG SER QL IB: PRESENT
B BURGDOR30KD IGG SER QL IB: PRESENT
B BURGDOR39KD IGG SER QL IB: PRESENT
B BURGDOR39KD IGM SER QL IB: ABNORMAL
B BURGDOR41KD IGG SER QL IB: PRESENT
B BURGDOR41KD IGM SER QL IB: ABNORMAL
B BURGDOR45KD IGG SER QL IB: PRESENT
B BURGDOR58KD IGG SER QL IB: PRESENT
B BURGDOR66KD IGG SER QL IB: ABNORMAL
B BURGDOR93KD IGG SER QL IB: PRESENT

## 2018-09-04 ENCOUNTER — TELEPHONE (OUTPATIENT)
Dept: FAMILY MEDICINE CLINIC | Facility: HOSPITAL | Age: 23
End: 2018-09-04

## 2018-09-13 ENCOUNTER — OFFICE VISIT (OUTPATIENT)
Dept: NEUROLOGY | Facility: CLINIC | Age: 23
End: 2018-09-13
Payer: COMMERCIAL

## 2018-09-13 VITALS
HEART RATE: 66 BPM | BODY MASS INDEX: 21.76 KG/M2 | WEIGHT: 135.4 LBS | SYSTOLIC BLOOD PRESSURE: 120 MMHG | DIASTOLIC BLOOD PRESSURE: 80 MMHG | HEIGHT: 66 IN

## 2018-09-13 DIAGNOSIS — R06.02 SHORTNESS OF BREATH: ICD-10-CM

## 2018-09-13 DIAGNOSIS — R22.1 NECK SWELLING: ICD-10-CM

## 2018-09-13 DIAGNOSIS — A69.20 LYME DISEASE: Primary | Chronic | ICD-10-CM

## 2018-09-13 DIAGNOSIS — H02.401 PTOSIS OF RIGHT EYELID: ICD-10-CM

## 2018-09-13 DIAGNOSIS — M79.18 MYOFASCIAL MUSCLE PAIN: ICD-10-CM

## 2018-09-13 DIAGNOSIS — M54.81 OCCIPITAL NEURALGIA OF RIGHT SIDE: ICD-10-CM

## 2018-09-13 DIAGNOSIS — R29.2 REFLEX ASYMMETRY: ICD-10-CM

## 2018-09-13 PROCEDURE — 99215 OFFICE O/P EST HI 40 MIN: CPT | Performed by: PHYSICIAN ASSISTANT

## 2018-09-13 RX ORDER — INFLUENZA A VIRUS A/MICHIGAN/45/2015 X-275 (H1N1) ANTIGEN (FORMALDEHYDE INACTIVATED), INFLUENZA A VIRUS A/HONG KONG/4801/2014 X-263B (H3N2) ANTIGEN (FORMALDEHYDE INACTIVATED), INFLUENZA B VIRUS B/PHUKET/3073/2013 ANTIGEN (FORMALDEHYDE INACTIVATED), AND INFLUENZA B VIRUS B/BRISBANE/60/2008 ANTIGEN (FORMALDEHYDE INACTIVATED) 15; 15; 15; 15 UG/.5ML; UG/.5ML; UG/.5ML; UG/.5ML
INJECTION, SUSPENSION INTRAMUSCULAR
Refills: 0 | COMMUNITY
Start: 2018-09-09 | End: 2018-09-25 | Stop reason: SDDI

## 2018-09-13 RX ORDER — PREGABALIN 75 MG/1
CAPSULE ORAL
Qty: 60 CAPSULE | Refills: 2 | Status: SHIPPED | OUTPATIENT
Start: 2018-09-13 | End: 2018-09-25 | Stop reason: SDDI

## 2018-09-13 NOTE — PROGRESS NOTES
Schedule pt for ct of chest abdomen and pelvis with contrast to r/o adenopathy- as per recommendation of neurology evaluation

## 2018-09-13 NOTE — PROGRESS NOTES
Patient ID: Stanford Correa  is a 20 y o  male  Assessment/Plan:    No problem-specific Assessment & Plan notes found for this encounter  Diagnoses and all orders for this visit:    Lyme disease    Myofascial muscle pain  -     pregabalin (LYRICA) 75 mg capsule; 1 tab qhs x 1 week, then BID if tolerated  Occipital neuralgia of right side  -     MRI cervical spine wo contrast; Future  -     pregabalin (LYRICA) 75 mg capsule; 1 tab qhs x 1 week, then BID if tolerated  Reflex asymmetry  -     MRI cervical spine wo contrast; Future  -     CTA head and neck w wo contrast; Future    Ptosis of right eyelid  -     Acetylcholine receptor, modulating; Future  -     Acetylcholine receptor, blocking; Future  -     Acetylcholine receptor, binding; Future  -     MUSK Antibody; Future  -     HORTENCIA Screen w/ Reflex to Titer/Pattern; Future  -     C-reactive protein; Future  -     Sedimentation rate, automated; Future  -     CBC and Platelet; Future  -     Comprehensive metabolic panel; Future  -     CTA head and neck w wo contrast; Future  -     Acetylcholine receptor, modulating  -     Acetylcholine receptor, blocking  -     Acetylcholine receptor, binding  -     MUSK Antibody  -     C-reactive protein  -     Sedimentation rate, automated  -     CBC and Platelet  -     Comprehensive metabolic panel    Neck swelling  -     MRI cervical spine wo contrast; Future  -     CTA head and neck w wo contrast; Future    Shortness of breath    Other orders  -     FLUZONE QUADRIVALENT 0 5 ML ANSELMO; TO BE ADMINISTERED BY PHARMACIST FOR IMMUNIZATION           R/o MG with sxs of SOB, chest pain, mild dysphagia, and I asked him to RT to PCP to make sure no other concerns ie  ?need of CT chest/ abdomen in light of the sxs  ?CRPS, will continue with Dr Dalton Black for repeat injections if appropriate per her and/ or botox injections   We could see him, but also suggested he see Dr Nacho Tate PM specialist who would do these as well     Considering left neck significant pain and swelling and ear pain, as noted in exam, I have ordered repeat CTA head and neck r/o dissection  Cervical MRI to ID cause for neck numbness, reflex asymetry, neck pain, as well as to r/o cord compression or degenerative process  No concern for myelopathy on exam nor subjectively  He has second opinion with [place] [facility] re: lyme infection  Last lyme test +Igg with multiple protein bands  Subjective:    HPI    Yanira Dietz  presents for neurological f/u for headaches and neck pain s/p work related injury      He is currently interviewing for a different position, he is hoping the new job will not necessitate excessive lifting or lifting over 100 lbs      Pain is right occipital region: ?occipital neuralgia and left sided neck, which radiates into the left SCM and left anterior chest  Reports sometimes SOB or heavy breathing when lying flat  This is transient, goes away after a few seconds- minutes  Denies speech changes  There is minimal dysphagia at times, and a sensation warmth when he swallows liquids  He feels that the symptoms are "internal" and not necessarily muscular or superficial    Last night he felt numbness at the C7 region over the vertebrae, and it is persistent today  He denies arm weakness were dropping things  He sometimes has numbness and tingling in the hands but not very frequent  He is most concerned about the  Left anterior neck swelling around the left SCM and this radiates up into the left ear and sometimes down into the left chest right below the clavicle  There is swelling, pain, sometimes spasm, and sometimes a sharp and shooting pain in this region  Overall slightly worse since it started  Trigger point injections did not significantly improve this pain    He has tried several medications as noted in the chart including indomethacin, muscle relaxants, Cymbalta, Toradol, nortriptyline and gabapentin, all which did not improve the pain        To review, he had a 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; hit to the chin and whiplash injury of the neck, neck whipped backwards or extended      He tells me he still has the pain daily- with some days worse than the other  On average pain is 5-6/10, and can reach a 7-8/10 on a daily basis for several hours, and worsens with activity such as lifting or moving the head or neck too much  States if he is resting it will drop down to a moderate severity  He denies photophobia, phonophobia, nausea, emesis, vertigo, vision or speech or cognition changes with headache/neck pain  States activity of any type makes it worse- states heavy lifting makes it worse  States he lifts in excess of a 100 lbs  He continues to work with no weakness or sensory loss noted  States his hearing in his left ear is more muffled- has seen an ENT with no problems noted per them  Medications tried and failed: tizanidine, nortriptyline, gabapentin- initially helped but wore off- no a/e, Cymbalta, Toradol, indomethacin, baclofen     He is trying olive leaf extract per his chiropractor and he tells me this helps with his neck pain  He states the severity of his pain is reduced since starting this  He is also taking Magnesium now  States anxiety stable  States heart palpitations stable on metoprolol      +Lyme found but no CNS Lyme per ID and cleared by ID  He will be seeing infectious disease for a 2nd opinion at [facility]   ---    The following portions of the patient's history were reviewed and updated as appropriate:   He  has a past medical history of concussion; Hypertension; and Postconcussive syndrome (12/14/2017)    He   Patient Active Problem List    Diagnosis Date Noted    Reflex asymmetry 09/13/2018    Neck swelling 09/13/2018    Myofascial muscle pain 07/13/2018    Palpitation 02/22/2018    Chronic migraine 01/04/2018    Postconcussive syndrome 12/14/2017    Ptosis of eyelid 10/26/2017    Occipital neuralgia of right side 09/19/2017    Headache disorder 09/19/2017    Allergic rhinitis 09/11/2017    Lyme disease 08/01/2017     He  has a past surgical history that includes Dental surgery  His family history includes COPD in his father  He  reports that he has quit smoking  His smoking use included Cigars  He has quit using smokeless tobacco  He reports that he does not drink alcohol or use drugs  Current Outpatient Prescriptions   Medication Sig Dispense Refill    FLUZONE QUADRIVALENT 0 5 ML ANSELMO TO BE ADMINISTERED BY PHARMACIST FOR IMMUNIZATION  0    baclofen 10 mg tablet 1 qhs and up to TID as tolerated (Patient not taking: Reported on 9/13/2018 ) 30 tablet 2    pregabalin (LYRICA) 75 mg capsule 1 tab qhs x 1 week, then BID if tolerated  60 capsule 2     No current facility-administered medications for this visit  Current Outpatient Prescriptions on File Prior to Visit   Medication Sig    baclofen 10 mg tablet 1 qhs and up to TID as tolerated (Patient not taking: Reported on 9/13/2018 )     No current facility-administered medications on file prior to visit  He has No Known Allergies            Objective:    Blood pressure 120/80, pulse 66, height 5' 6" (1 676 m), weight 61 4 kg (135 lb 6 4 oz)  Physical Exam    Neurological Exam  Vital signs reviewed  Well developed, well nourished  Head: Normocephalic, atraumatic  Neck: Neck flexors 5/5, exquisite tenderness to palpation of the left SCM and left anterior neck as well as the left clavicular region more midline just below the clavicle  There appears to be increased bulk and possibly swelling to a minimal degree on the left anterior neck but not the chest   He does not have tenderness to palpation of the cervical paraspinal muscles, suboccipital region or cervical vertebrae    CN 2-12: intact and symmetric, including EOMs which are normal b/l and PERRL  Fundi b/l are normal to crude ophthalmological examination  MSK: 5/5 t/o, except some mild weakness in the left finger abduction  ROM normal x all 4 extr  Sensation: Inact to LT x4 extr  Reflexes: 1+ in the left upper extremity at the biceps, otherwise 2+ throughout  Coordination: Nml x4 extr  Gait: Steady normal gait  ROS:    Review of Systems   Constitutional: Negative  Negative for appetite change and fever  HENT: Positive for trouble swallowing (Little bit)  Negative for hearing loss, tinnitus and voice change  Eyes: Negative  Negative for photophobia and pain  Respiratory: Positive for shortness of breath  Cardiovascular: Positive for palpitations  Gastrointestinal: Negative  Negative for nausea and vomiting  Endocrine: Negative  Negative for cold intolerance and heat intolerance  Genitourinary: Negative  Negative for dysuria, frequency and urgency  Musculoskeletal: Positive for neck pain  Negative for myalgias  Skin: Negative  Negative for rash  Neurological: Positive for dizziness (here and there), light-headedness (sometimes) and headaches  Negative for tremors, seizures, syncope, facial asymmetry, speech difficulty, weakness and numbness  Hematological: Negative  Does not bruise/bleed easily  Psychiatric/Behavioral: Positive for sleep disturbance (trouble staying asleep and staying asleep)  Negative for confusion and hallucinations  Review of systems, Past medical history, Surgical history, Family history, Social history and Medication history were reviewed and otherwise unremarkable from a neurological perspective

## 2018-09-18 ENCOUNTER — TRANSCRIBE ORDERS (OUTPATIENT)
Dept: FAMILY MEDICINE CLINIC | Facility: HOSPITAL | Age: 23
End: 2018-09-18

## 2018-09-18 DIAGNOSIS — R06.02 SHORTNESS OF BREATH: Primary | ICD-10-CM

## 2018-09-19 ENCOUNTER — TELEPHONE (OUTPATIENT)
Dept: NEUROLOGY | Facility: CLINIC | Age: 23
End: 2018-09-19

## 2018-09-20 ENCOUNTER — TELEPHONE (OUTPATIENT)
Dept: NEUROLOGY | Facility: CLINIC | Age: 23
End: 2018-09-20

## 2018-09-20 NOTE — TELEPHONE ENCOUNTER
Received fax from [facility], [place] for lyrica denied due to diagnosis not being covered

## 2018-09-22 LAB
ACHR BIND AB SER-SCNC: <0.03 NMOL/L (ref 0–0.24)
ACHR BLOCK AB/ACHR TOTAL SFR SER: 18 % (ref 0–25)
ACHR MOD AB/ACHR TOTAL SFR SER: <12 % (ref 0–20)
ALBUMIN SERPL-MCNC: 4.7 G/DL (ref 3.5–5.5)
ALBUMIN/GLOB SERPL: 2.1 {RATIO} (ref 1.2–2.2)
ALP SERPL-CCNC: 72 IU/L (ref 39–117)
ALT SERPL-CCNC: 15 IU/L (ref 0–44)
ANA NUCLEOLAR TITR SER: NORMAL {TITER}
ANA TITR SER IF: POSITIVE {TITER}
AST SERPL-CCNC: 21 IU/L (ref 0–40)
BILIRUB SERPL-MCNC: 0.3 MG/DL (ref 0–1.2)
BUN SERPL-MCNC: 14 MG/DL (ref 6–20)
BUN/CREAT SERPL: 13 (ref 9–20)
CALCIUM SERPL-MCNC: 9.6 MG/DL (ref 8.7–10.2)
CHLORIDE SERPL-SCNC: 100 MMOL/L (ref 96–106)
CO2 SERPL-SCNC: 25 MMOL/L (ref 20–29)
CREAT SERPL-MCNC: 1.05 MG/DL (ref 0.76–1.27)
CRP SERPL-MCNC: 0.3 MG/L (ref 0–4.9)
ERYTHROCYTE [DISTWIDTH] IN BLOOD BY AUTOMATED COUNT: 13.4 % (ref 12.3–15.4)
ERYTHROCYTE [SEDIMENTATION RATE] IN BLOOD BY WESTERGREN METHOD: 2 MM/HR (ref 0–15)
GLOBULIN SER-MCNC: 2.2 G/DL (ref 1.5–4.5)
GLUCOSE SERPL-MCNC: 85 MG/DL (ref 65–99)
HCT VFR BLD AUTO: 47.6 % (ref 37.5–51)
HGB BLD-MCNC: 15.9 G/DL (ref 13–17.7)
MCH RBC QN AUTO: 30.2 PG (ref 26.6–33)
MCHC RBC AUTO-ENTMCNC: 33.4 G/DL (ref 31.5–35.7)
MCV RBC AUTO: 90 FL (ref 79–97)
MUSK AB SER IA-ACNC: <1 U/ML
PLATELET # BLD AUTO: 155 X10E3/UL (ref 150–379)
POTASSIUM SERPL-SCNC: 4.6 MMOL/L (ref 3.5–5.2)
PROT SERPL-MCNC: 6.9 G/DL (ref 6–8.5)
RBC # BLD AUTO: 5.27 X10E6/UL (ref 4.14–5.8)
SL AMB EGFR AFRICAN AMERICAN: 115 ML/MIN/1.73
SL AMB EGFR NON AFRICAN AMERICAN: 100 ML/MIN/1.73
SL AMB NOTE:: NORMAL
SODIUM SERPL-SCNC: 142 MMOL/L (ref 134–144)
WBC # BLD AUTO: 4 X10E3/UL (ref 3.4–10.8)

## 2018-09-25 ENCOUNTER — OFFICE VISIT (OUTPATIENT)
Dept: CARDIOLOGY CLINIC | Facility: CLINIC | Age: 23
End: 2018-09-25
Payer: COMMERCIAL

## 2018-09-25 VITALS
BODY MASS INDEX: 21.95 KG/M2 | HEART RATE: 69 BPM | WEIGHT: 136.6 LBS | SYSTOLIC BLOOD PRESSURE: 102 MMHG | DIASTOLIC BLOOD PRESSURE: 60 MMHG | HEIGHT: 66 IN

## 2018-09-25 DIAGNOSIS — R06.02 SHORTNESS OF BREATH: ICD-10-CM

## 2018-09-25 PROCEDURE — 93000 ELECTROCARDIOGRAM COMPLETE: CPT | Performed by: INTERNAL MEDICINE

## 2018-09-25 PROCEDURE — 99213 OFFICE O/P EST LOW 20 MIN: CPT | Performed by: INTERNAL MEDICINE

## 2018-09-25 RX ORDER — DIPHENOXYLATE HYDROCHLORIDE AND ATROPINE SULFATE 2.5; .025 MG/1; MG/1
1 TABLET ORAL DAILY
COMMUNITY

## 2018-09-25 NOTE — PROGRESS NOTES
Cardiology Follow Up    Yanira Dietz   1995  [id number]  [address] [facility] [address] [address]  Shortness of breath  Ambulatory referral to Cardiology    POCT ECG       Discussion: Full evaluation in the past disclosed no evidence of cardiac pathology  His symptoms continue and her not cardiac in nature  I reassured him  No further workup is indicated  He can return as needed  Cardiovascular History:   History detailed disclosed constant chest pain and constant dyspnea, present for years  He was evaluated for this one year ago by Dr Lakeisha Lira  Echo was normal   There is no evidence of structural heart disease  Treadmill test was normal   He was able exercise for 15 minutes  Holter monitor showed no significant abnormalities  His physical exam is presently entirely benign  I reassured him that his symptoms were not cardiac in etiology  No other workup is recorded at the present time  Patient Active Problem List   Diagnosis    Occipital neuralgia of right side    Headache disorder    Lyme disease    Postconcussive syndrome    Ptosis of eyelid    Chronic migraine    Allergic rhinitis    Palpitation    Myofascial muscle pain    Reflex asymmetry    Neck swelling     Past Medical History:   Diagnosis Date    Hx of concussion     last assessed 3/28/17    Hypertension     Postconcussive syndrome 12/14/2017     Social History     Social History    Marital status: Single     Spouse name: N/A    Number of children: N/A    Years of education: N/A     Occupational History    Not on file       Social History Main Topics    Smoking status: Former Smoker     Types: Cigars    Smokeless tobacco: Former User      Comment: quit 2 yr    Alcohol use No    Drug use: No    Sexual activity: Not on file     Other Topics Concern    Not on file     Social History Narrative    Dental care regularly    Lives with family - Mom and Dad    Living situation - supportive and safe    No advance directives      Family History   Problem Relation Age of Onset    COPD Father      Past Surgical History:   Procedure Laterality Date    DENTAL SURGERY         Current Outpatient Prescriptions:     multivitamin (THERAGRAN) TABS, Take 1 tablet by mouth daily, Disp: , Rfl:   No Known Allergies    Labs:  Office Visit on 09/13/2018   Component Date Value    AChR Modulating Abs 09/13/2018 <12     AChR Blocking Abs, Serum 09/13/2018 18     AChR Binding Ab, Serum 09/13/2018 <0 03     MUSK ANTIBODY 09/13/2018 <1 0     C-Reactive Protein, Quant 09/13/2018 0 3     SL AMB SED RATE BY MODIF* 09/13/2018 2     White Blood Cell Count 09/13/2018 4 0     Red Blood Cell Count 09/13/2018 5 27     Hemoglobin 09/13/2018 15 9     HCT 09/13/2018 47 6     MCV 09/13/2018 90     MCH 09/13/2018 30 2     MCHC 09/13/2018 33 4     RDW 09/13/2018 13 4     Platelet Count 95/01/6845 155     Glucose 09/13/2018 85     BUN 09/13/2018 14     Creatinine 09/13/2018 1 05     eGFR Non African American 09/13/2018 100     SL AMB EGFR  AMER* 09/13/2018 115     SL AMB BUN/CREATININE RA* 09/13/2018 13     Sodium 09/13/2018 142     SL AMB POTASSIUM 09/13/2018 4 6     Chloride 09/13/2018 100     CO2 09/13/2018 25     CALCIUM 09/13/2018 9 6     SL AMB PROTEIN, TOTAL 09/13/2018 6 9     Albumin 09/13/2018 4 7     Globulin, Total 09/13/2018 2 2     SL AMB ALBUMIN/GLOBULIN * 09/13/2018 2 1     TOTAL BILIRUBIN 09/13/2018 0 3     Alk Phos Isoenzymes 09/13/2018 72     SL AMB AST 09/13/2018 21     SL AMB ALT 09/13/2018 15     Antinuclear Antibodies, * 09/13/2018 Positive*    Nucleolar Pattern 09/13/2018 1:80     Note: 09/13/2018 Comment    Orders Only on 08/30/2018   Component Date Value    Lyme IgG/IgM Ab 08/30/2018 2 79*    Lyme 93 kD IgG 08/30/2018 Present*    Lyme 66 kD IgG 08/30/2018 Absent  Lyme 58 kD IgG 08/30/2018 Present*    Lyme 45 kD IgG 08/30/2018 Present*    Lyme 41 kD IgG 08/30/2018 Present*    Lyme 39 kD IgG 08/30/2018 Present*    Lyme 30 kD IgG 08/30/2018 Present*    Lyme 28 kD IgG 08/30/2018 Present*    Lyme 23 kD IgG 08/30/2018 Absent     Lyme 18 kD IgG 08/30/2018 Present*    Lyme IgG WB Interp  08/30/2018 Positive*    Lyme 41 kD IgM 08/30/2018 Absent     Lyme 39 kD IgM 08/30/2018 Absent     Lyme 23 kD IgM 08/30/2018 Present*    Lyme IgM WB Interp  08/30/2018 Negative      Imaging: No results found  Review of Systems:  ROS    Physical Exam:  Physical Exam   Constitutional: He is oriented to person, place, and time  He appears well-developed and well-nourished  No distress  HENT:   Head: Normocephalic and atraumatic  Eyes: Conjunctivae and EOM are normal  No scleral icterus  Neck: Normal range of motion  Neck supple  No JVD present  No tracheal deviation present  Cardiovascular: Normal rate, regular rhythm, normal heart sounds and intact distal pulses  Exam reveals no gallop and no friction rub  No murmur heard  Pulmonary/Chest: Effort normal and breath sounds normal  No stridor  No respiratory distress  He has no wheezes  He has no rales  He exhibits no tenderness  Abdominal: Soft  Bowel sounds are normal  He exhibits no distension  There is no tenderness  Musculoskeletal: Normal range of motion  He exhibits no edema or tenderness  Neurological: He is alert and oriented to person, place, and time  No cranial nerve deficit  Coordination normal    Skin: Skin is warm and dry  He is not diaphoretic  No erythema  Psychiatric: He has a normal mood and affect  His behavior is normal  Judgment and thought content normal    Vitals reviewed        Alonzo Tony MD

## 2018-09-27 ENCOUNTER — APPOINTMENT (OUTPATIENT)
Dept: CT IMAGING | Facility: HOSPITAL | Age: 23
End: 2018-09-27
Payer: COMMERCIAL

## 2018-09-27 ENCOUNTER — HOSPITAL ENCOUNTER (OUTPATIENT)
Dept: MRI IMAGING | Facility: HOSPITAL | Age: 23
Discharge: HOME | End: 2018-09-27
Payer: COMMERCIAL

## 2018-09-27 DIAGNOSIS — M54.81 OCCIPITAL NEURALGIA OF RIGHT SIDE: ICD-10-CM

## 2018-09-27 DIAGNOSIS — R22.1 NECK SWELLING: ICD-10-CM

## 2018-09-27 DIAGNOSIS — R29.2 REFLEX ASYMMETRY: ICD-10-CM

## 2018-09-27 PROCEDURE — 72141 MRI NECK SPINE W/O DYE: CPT

## 2018-10-01 ENCOUNTER — TELEPHONE (OUTPATIENT)
Dept: NEUROLOGY | Facility: CLINIC | Age: 23
End: 2018-10-01

## 2018-10-01 DIAGNOSIS — R76.8 ANA POSITIVE: Primary | ICD-10-CM

## 2018-10-01 NOTE — TELEPHONE ENCOUNTER
Called and spoke to pt and advised him of results  He is willing to see rheum   Please place referral

## 2018-10-01 NOTE — TELEPHONE ENCOUNTER
Please tell him positive ERENDIRA, however this is non specific and needs work up by rheumatologist  Would he mind going to see one to better interpret these results? Thanks very much  Terrence Keller- could you please mail him these last labs- just the erendira and RAQUEL? Thanks

## 2018-10-05 ENCOUNTER — OFFICE VISIT (OUTPATIENT)
Dept: FAMILY MEDICINE CLINIC | Facility: HOSPITAL | Age: 23
End: 2018-10-05
Payer: COMMERCIAL

## 2018-10-05 VITALS
TEMPERATURE: 96.6 F | WEIGHT: 135.5 LBS | SYSTOLIC BLOOD PRESSURE: 114 MMHG | OXYGEN SATURATION: 98 % | BODY MASS INDEX: 21.78 KG/M2 | HEART RATE: 88 BPM | DIASTOLIC BLOOD PRESSURE: 80 MMHG | HEIGHT: 66 IN

## 2018-10-05 DIAGNOSIS — R76.8 POSITIVE ANA (ANTINUCLEAR ANTIBODY): ICD-10-CM

## 2018-10-05 DIAGNOSIS — H02.401 PTOSIS OF RIGHT EYELID: ICD-10-CM

## 2018-10-05 DIAGNOSIS — IMO0002 CHRONIC MIGRAINE: Primary | ICD-10-CM

## 2018-10-05 DIAGNOSIS — M79.18 MYOFASCIAL MUSCLE PAIN: ICD-10-CM

## 2018-10-05 DIAGNOSIS — R06.02 SHORTNESS OF BREATH: ICD-10-CM

## 2018-10-05 PROCEDURE — 99214 OFFICE O/P EST MOD 30 MIN: CPT | Performed by: INTERNAL MEDICINE

## 2018-10-05 NOTE — PROGRESS NOTES
Assessment/Plan:             Problem List Items Addressed This Visit        Cardiovascular and Mediastinum    Chronic migraine - Primary     Working 12 hours per week, having daily headache and now more generalized headache which has been recently worse( in both frontal region and extending to back of head)            Other    Myofascial muscle pain     Seen by neurology and they had tired gabapentin- no real improvement- now they wanted to try Lyrica but his insurance denied it and he is awaiting appeal  Also his insurance only approved an mri  Had additional labs done  He is awaiting recall for appt with Dr Marely Denton pain management also  Has increased pain in left side of neck- worse after working with lifting and pulling at job  Shortness of breath     Has some issues on a daily basis         Positive HORTENCIA (antinuclear antibody)     Will have pt seen by rheumatology                 Subjective:      Patient ID: Mark Dang  is a 20 y o  male    1  Ongoing neck and headache issues  Seeing neurology- as above- had mri of c spine with some disc bulging and straightening of cervical lordosis        The following portions of the patient's history were reviewed and updated as appropriate: allergies, current medications and problem list      Review of Systems   Constitutional: Positive for fatigue  Gastrointestinal:        Feels like turkey gets stuck when swallowing  No problems swallowing liquids  No known triggers but  has some waves of feeling very ill- not syncopal but feels wobbly and waves of  nausea and has to have a BM  No major change in BM overall     Neurological:        Left neck pain is worst region of pain         Objective:      Current Outpatient Prescriptions:     multivitamin (THERAGRAN) TABS, Take 1 tablet by mouth daily, Disp: , Rfl:     Blood pressure 114/80, pulse 88, temperature (!) 96 6 °F (35 9 °C), temperature source Tympanic, height 5' 6" (1 676 m), weight 61 5 kg (135 lb 8 oz), SpO2 98 %  Physical Exam   Constitutional: He is oriented to person, place, and time  He appears well-developed and well-nourished  HENT:   Head: Normocephalic  Right Ear: External ear normal    Left Ear: External ear normal    Eyes: Conjunctivae are normal  Right eye exhibits no discharge  Left eye exhibits no discharge  Neck: No JVD present  Tenderness left anterior neck no masses   Cardiovascular: Normal rate and regular rhythm  No murmur heard  Pulmonary/Chest: Effort normal and breath sounds normal  No respiratory distress  He has no wheezes  Abdominal: Soft  Bowel sounds are normal  He exhibits no distension  There is no tenderness  Musculoskeletal: He exhibits tenderness  Tightness of paravertebral muscles right lateral neck  Neurological: He is alert and oriented to person, place, and time  Skin: No rash noted  No erythema  Nursing note and vitals reviewed

## 2018-10-05 NOTE — ASSESSMENT & PLAN NOTE
Working 12 hours per week, having daily headache and now more generalized headache which has been recently worse( in both frontal region and extending to back of head)

## 2018-10-05 NOTE — ASSESSMENT & PLAN NOTE
Seen by neurology and they had tired gabapentin- no real improvement- now they wanted to try Lyrica but his insurance denied it and he is awaiting appeal  Also his insurance only approved an mri  Had additional labs done  He is awaiting recall for appt with Dr Kathi Sever pain management also  Has increased pain in left side of neck- worse after working with lifting and pulling at job

## 2018-10-22 DIAGNOSIS — M54.81 OCCIPITAL NEURALGIA, UNSPECIFIED LATERALITY: Primary | ICD-10-CM

## 2018-10-22 DIAGNOSIS — R29.2 ABNORMAL REFLEX: ICD-10-CM

## 2018-10-22 DIAGNOSIS — R22.1 NECK MASS: ICD-10-CM

## 2018-10-24 ENCOUNTER — TRANSCRIBE ORDERS (OUTPATIENT)
Dept: FAMILY MEDICINE CLINIC | Facility: HOSPITAL | Age: 23
End: 2018-10-24

## 2018-10-24 DIAGNOSIS — G44.221 CHRONIC TENSION-TYPE HEADACHE, INTRACTABLE: Primary | ICD-10-CM

## 2018-10-24 DIAGNOSIS — M54.2 CERVICALGIA: ICD-10-CM

## 2018-11-01 ENCOUNTER — OFFICE VISIT (OUTPATIENT)
Dept: RHEUMATOLOGY | Facility: CLINIC | Age: 23
End: 2018-11-01
Payer: COMMERCIAL

## 2018-11-01 VITALS
DIASTOLIC BLOOD PRESSURE: 80 MMHG | BODY MASS INDEX: 21.89 KG/M2 | SYSTOLIC BLOOD PRESSURE: 129 MMHG | HEART RATE: 109 BPM | WEIGHT: 136.2 LBS | HEIGHT: 66 IN

## 2018-11-01 DIAGNOSIS — H02.401 PTOSIS OF RIGHT EYELID: ICD-10-CM

## 2018-11-01 DIAGNOSIS — R76.8 POSITIVE ANA (ANTINUCLEAR ANTIBODY): ICD-10-CM

## 2018-11-01 DIAGNOSIS — R10.84 GENERALIZED ABDOMINAL PAIN: ICD-10-CM

## 2018-11-01 DIAGNOSIS — R06.02 SHORTNESS OF BREATH: Primary | ICD-10-CM

## 2018-11-01 DIAGNOSIS — M79.18 MYOFASCIAL MUSCLE PAIN: ICD-10-CM

## 2018-11-01 PROCEDURE — 99205 OFFICE O/P NEW HI 60 MIN: CPT | Performed by: INTERNAL MEDICINE

## 2018-11-01 NOTE — PROGRESS NOTES
Assessment and Plan:   Mr Martinez Hudson is a 43-year-old  male with history significant for Lyme disease, who presents for further evaluation of a positive HORTENCIA  Kristy Shyla presents today with multiple complaints related to a heightened sensation of pain, along with a positive HORTENCIA, but without any additional clinical findings to suggest an underlying autoimmune disease  - I would like to obtain the below mentioned labs to further evaluate, as well as obtain a repeat chest x-ray in view of persistent shortness of breath, to see if there has been any change compared to 2017  There is also mention that his primary care physician may perform additional testing including a CT chest, abdomen and pelvis  - In view of alteration of his bowel movements associated with abdominal discomfort, I discussed a referral to Gastroenterology to see if he may benefit from further testing   - There does not appear to be significant evidence of myofascial symptoms on his physical examination today either, and in addition he has not responded to a trial of multiple medications including muscle relaxants, NSAIDs, gabapentin, Cymbalta and nortriptyline  - I also wonder if he may need to see a Lyme specialist, to see if his symptoms could be related  - I will see him back in the office in 3-4 weeks to discuss his test results  Plan:  Diagnoses and all orders for this visit:    Shortness of breath  -     XR chest pa & lateral; Future    Myofascial muscle pain  -     Ambulatory referral to Rheumatology    Positive HORTENCIA (antinuclear antibody)  -     Ambulatory referral to Rheumatology  -     Angiotensin converting enzyme; Future  -     Anti-DNA antibody, double-stranded; Future  -     Anti-Geetha 1 Antibody; Future  -     Anti-scleroderma antibody; Future  -     Beta-2 glycoprotein antibodies; Future  -     C3 complement; Future  -     C4 complement; Future  -     Cardiolipin antibody;  Future  -     Celiac Disease Antibody Profile; Future  -     Centromere Antibody; Future  -     CK; Future  -     C-reactive protein; Future  -     Cyclic citrul peptide antibody, IgG; Future  -     DRVVT; Future  -     Ferritin; Future  -     Histone Antibody; Future  -     Nuclear antigen antibody; Future  -     Protein / creatinine ratio, urine  -     Sedimentation rate, automated; Future  -     Sjogren's Antibodies; Future  -     Urinalysis with microscopic  -     Vitamin D 25 hydroxy; Future  -     Hepatitis B core antibody, total; Future  -     Hepatitis B core antibody, IgM; Future  -     Hepatitis B surface antibody; Future  -     Hepatitis B surface antigen; Future  -     Hepatitis C antibody; Future  -     HIV 1/2 Antigen/Antibody,Fourth Generation W/Rfl; Future  -     Rheumatoid Arthritis Factor; Future    Ptosis of right eyelid  -     Ambulatory referral to Rheumatology    Generalized abdominal pain  -     Ambulatory referral to Gastroenterology; Future      Activities as tolerated    Diet: low carb/low fat, more greens/vegetables, adequate hydration  Exercise: try to maintain a low impact exercise regimen as much as possible  Walk for 30 minutes a day for at least 3 days a week    Encouraged to maintain good sleep hygiene  Continue other medications as prescribed by PCP and other specialists        RTC in 4 weeks  HPI  Mr Tarik Juan is a 79-year-old  male with history significant for Lyme disease, who presents for further evaluation of a positive HORTENCIA  Patient states his initial symptoms started about 2 years ago with neck pain that radiates up into his head  He mentions due to occupational exposure he has had multiple injuries to his neck, but states the symptoms started 2 months following the injury and has persisted since then  He states that he appreciates a swelling of the muscle located on the left side of his neck    The pain has been present constantly with slight variation in intensity during the day, but he mostly rates it at 6 to 7/10 in intensity  He feels there has been a slight progression in his overall symptoms  He has also had trigger point injections which did not have a significant benefit  He has also been seen by Neurology on multiple occasions for the same complaints and has undergone extensive testing, including ultrasound of his thyroid, CT head, CT soft tissue neck, CTA head and neck, MRI brain and MRI cervical spine which have all been unremarkable, except for nonspecific straightening of the cervical lordosis and mild spondylosis noted on cervical spine imaging  He also underwent testing for myasthenia gravis which was unremarkable  He has been tried on several medications including indomethacin, muscle relaxants, Cymbalta, Toradol, nortriptyline and gabapentin which did not help  Neurology also planned to start him on Lyrica but this was not approved by his insurance  In addition to the above-mentioned complaints he also reports chest pain for which he has undergone evaluation by Cardiology, including an echo and stress test which were normal   The etiology of his symptoms were not felt to be related to a cardiac origin  The chest pain is associated with shortness of breath, which can occur both with activities and at rest   He denies fevers, chills, unintentional weight loss, night sweats, cough or hemoptysis  He is unable to describe the character of the pain, but does not feel it similar to reflux symptoms  He does also report an occasional difficulty swallowing of dry foods  Over the same period of time he has also had a sensation of abdominal discomfort which appears to be generalized, and states that on occasion it is associated with an urge to defecate, which temporarily relieves the symptoms  He has also noticed a completely white stool on a few rare occasions  On average though he does have regular bowel movements without any mention of blood    He denies any yellowing of his skin or eyes, itching or dark urine  Asthma fevers, chills, night sweats, unintentional weight loss, seizures, inflammatory eye disease, sicca symptoms, photosensitivity, psoriasis, skin tightening/thickening, GERD, mouth or nose ulcers, recurrent sinus infections, cough, hemoptysis, swollen glands, pleuritic chest pain, vomiting, blood in stools, blood clots, muscle weakness, numbness/tingling, joint pains/swelling, low back pain, Raynaud's or family history of autoimmune disease  He does report an occasional skin rash which was previously diagnosed as eczema, and for which she uses topical ointments  He reports the rash appears more prominently during the winter and has no association with sun exposure  Lyme testing in August 2018 was positive for IgG, with negative IgM  He has been evaluated by Infectious Disease and it is not felt that his symptoms are related to prior Lyme exposure  HORTENCIA was also checked and was found to be positive at 1:80 nucleolar pattern  CBC, CMP, TSH, ESR and CRP were normal     The following portions of the patient's history were reviewed and updated as appropriate: allergies, current medications, past family history, past medical history, past social history, past surgical history and problem list       Review of Systems  Constitutional: Negative for weight change, fevers, chills, night sweats  Positive for fatigue  ENT/Mouth: Negative for ear pain, nasal congestion, sinus pain, hoarseness, sore throat, rhinorrhea, swallowing difficulty  Positive for hearing loss  Eyes: Negative for pain, redness, discharge  Positive for visual disturbances  Cardiovascular: Negative for palpitations  Positive for chest pain and shortness of breath  Respiratory: Negative for cough, sputum, wheezing  Gastrointestinal: Negative for vomiting, constipation, heartburn  Positive for nausea, diarrhea and abdominal pain  Genitourinary: Negative for dysuria, urinary frequency, hematuria  Musculoskeletal:  Negative for joint pain and swelling  Skin: Negative for color changes  Positive for rash  Neuro: Negative for weakness, numbness, tingling, loss of consciousness  Positive for headache and dizziness  Psych: Negative for anxiety, depression  Heme/Lymph: Negative for easy bruising, bleeding, lymphadenopathy  Past Medical History:   Diagnosis Date    Dizziness     Frequent headaches     Heart burn     Hx of concussion     last assessed 3/28/17    Hypertension     Palpitations     Postconcussive syndrome 12/14/2017    SOB (shortness of breath)     Swelling     Weakness        Past Surgical History:   Procedure Laterality Date    DENTAL SURGERY         Social History     Social History    Marital status: Single     Spouse name: N/A    Number of children: N/A    Years of education: N/A     Occupational History    Not on file       Social History Main Topics    Smoking status: Former Smoker     Types: Cigars    Smokeless tobacco: Former User      Comment: quit 2 yr    Alcohol use No    Drug use: No    Sexual activity: Not on file     Other Topics Concern    Not on file     Social History Narrative    Dental care regularly    Lives with family - Mom and Dad    Living situation - supportive and safe    No advance directives       Family History   Problem Relation Age of Onset    COPD Father     Hypertension Father     Stroke Father     Hyperlipidemia Father     Hypertension Maternal Grandfather     Cancer Paternal Grandfather     Hyperlipidemia Paternal Grandfather     Hypertension Paternal Uncle     Stroke Paternal Uncle        No Known Allergies      Current Outpatient Prescriptions:     multivitamin (THERAGRAN) TABS, Take 1 tablet by mouth daily, Disp: , Rfl:       Objective:    Vitals:    11/01/18 1109   BP: 129/80   BP Location: Left arm   Patient Position: Sitting   Cuff Size: Adult   Pulse: (!) 109   Weight: 61 8 kg (136 lb 3 2 oz)   Height: 5' 5 5" (1 664 m)       Physical Exam  General: Well appearing, well nourished, in no distress  Oriented x 3, normal mood and affect  Ambulating without difficulty  Skin: Good turgor, no unusual bruising or prominent lesions  He has keratosis pilaris bilaterally on upper arms  He also has a small circular dry area present on his right upper arm, with a few smaller areas on his right forearm  He has a slight bumpy appearing rash in his flank region, which appears as keratosis pilaris  Hair: Normal texture and distribution  Nails: Normal color, no deformities  HEENT:  Head: Normocephalic, atraumatic  Eyes: Conjunctiva clear, sclera non-icteric, EOM intact  Nose: No external lesions, mucosa non-inflamed  Mouth: Mucous membranes moist, no mucosal lesions  Neck: Supple, thyroid non-enlarged and non-tender  He has pain on range of motion in all directions, with limitation noted in forward flexion  Bilateral upper cervical lymphadenopathy present which is tender  Heart: Regular rate and rhythm, no murmur or gallop  Lungs: Clear to auscultation, no crackles or wheezing  Abdomen: Soft, non-tender, non-distended, bowel sounds normal    Extremities: No amputations or deformities, cyanosis, edema  Musculoskeletal:  No joint swelling or tenderness appreciated  Range of motion is full in all joints  Negative fibromyalgia tender points  No myofascial tenderness present  Neurologic: Alert and oriented  No focal neurological deficits appreciated  Strength 5/5 in all extremities  Psychiatric: Normal mood and affect  Herbert Dancer, M D    Rheumatology

## 2018-11-06 LAB
25(OH)D3+25(OH)D2 SERPL-MCNC: 38.1 NG/ML (ref 30–100)
ACE SERPL-CCNC: 25 U/L (ref 14–82)
APPEARANCE UR: CLEAR
B2 GLYCOPROT1 IGA SER-ACNC: <9 GPI IGA UNITS (ref 0–25)
B2 GLYCOPROT1 IGG SER-ACNC: <9 GPI IGG UNITS (ref 0–20)
B2 GLYCOPROT1 IGM SER-ACNC: <9 GPI IGM UNITS (ref 0–32)
BACTERIA URNS QL MICRO: NORMAL
BILIRUB UR QL STRIP: NEGATIVE
C3 SERPL-MCNC: 106 MG/DL (ref 82–167)
C4 SERPL-MCNC: 18 MG/DL (ref 14–44)
CARDIOLIPIN IGA SER IA-ACNC: <9 APL U/ML (ref 0–11)
CARDIOLIPIN IGG SER IA-ACNC: <9 GPL U/ML (ref 0–14)
CARDIOLIPIN IGM SER IA-ACNC: <9 MPL U/ML (ref 0–12)
CCP IGA+IGG SERPL IA-ACNC: 7 UNITS (ref 0–19)
CENTROMERE B AB SER-ACNC: <0.2 AI (ref 0–0.9)
CK SERPL-CCNC: 124 U/L (ref 24–204)
COLOR UR: YELLOW
CREAT UR-MCNC: 40.5 MG/DL
CRP SERPL-MCNC: <0.3 MG/L (ref 0–4.9)
DSDNA AB SER-ACNC: <1 IU/ML (ref 0–9)
ENA JO1 AB SER-ACNC: <0.2 AI (ref 0–0.9)
ENA RNP AB SER-ACNC: <0.2 AI (ref 0–0.9)
ENA SCL70 AB SER-ACNC: <0.2 AI (ref 0–0.9)
ENA SM AB SER-ACNC: <0.2 AI (ref 0–0.9)
ENA SS-A AB SER-ACNC: <0.2 AI (ref 0–0.9)
ENA SS-B AB SER-ACNC: <0.2 AI (ref 0–0.9)
ENDOMYSIUM IGA SER QL: NEGATIVE
EPI CELLS #/AREA URNS HPF: NORMAL /HPF
ERYTHROCYTE [SEDIMENTATION RATE] IN BLOOD BY WESTERGREN METHOD: 2 MM/HR (ref 0–15)
FERRITIN SERPL-MCNC: 171 NG/ML (ref 30–400)
GLIADIN PEPTIDE IGA SER-ACNC: 4 UNITS (ref 0–19)
GLIADIN PEPTIDE IGG SER-ACNC: 2 UNITS (ref 0–19)
GLUCOSE UR QL: NEGATIVE
HBV CORE AB SERPL QL IA: NEGATIVE
HBV CORE IGM SERPL QL IA: NEGATIVE
HBV SURFACE AB SER-ACNC: 8.4 MIU/ML
HBV SURFACE AG SERPL QL IA: NEGATIVE
HCV AB S/CO SERPL IA: <0.1 S/CO RATIO (ref 0–0.9)
HGB UR QL STRIP: NEGATIVE
HISTONE IGG SER IA-ACNC: 0.5 UNITS (ref 0–0.9)
HIV 1+2 AB+HIV1 P24 AG SERPL QL IA: NON REACTIVE
IGA SERPL-MCNC: 220 MG/DL (ref 90–386)
KETONES UR QL STRIP: NEGATIVE
LEUKOCYTE ESTERASE UR QL STRIP: NEGATIVE
MICRO URNS: NORMAL
MICRO URNS: NORMAL
NITRITE UR QL STRIP: NEGATIVE
PH UR STRIP: 6.5 [PH] (ref 5–7.5)
PROT UR QL STRIP: NEGATIVE
PROT UR-MCNC: <4 MG/DL
PROT/CREAT UR: ABNORMAL MG/G CREAT (ref 0–200)
RBC #/AREA URNS HPF: NORMAL /HPF
RHEUMATOID FACT SERPL-ACNC: <10 IU/ML (ref 0–13.9)
SP GR UR: 1.01 (ref 1–1.03)
TTG IGA SER-ACNC: 9 U/ML (ref 0–3)
TTG IGG SER-ACNC: <2 U/ML (ref 0–5)
UROBILINOGEN UR STRIP-ACNC: 0.2 EU/DL (ref 0.2–1)
WBC #/AREA URNS HPF: NORMAL /HPF

## 2018-11-14 ENCOUNTER — OFFICE VISIT (OUTPATIENT)
Dept: NEUROLOGY | Facility: CLINIC | Age: 23
End: 2018-11-14
Payer: COMMERCIAL

## 2018-11-14 VITALS
HEIGHT: 66 IN | BODY MASS INDEX: 22.14 KG/M2 | DIASTOLIC BLOOD PRESSURE: 82 MMHG | HEART RATE: 65 BPM | SYSTOLIC BLOOD PRESSURE: 120 MMHG | WEIGHT: 137.8 LBS

## 2018-11-14 DIAGNOSIS — G44.221 CHRONIC TENSION-TYPE HEADACHE, INTRACTABLE: ICD-10-CM

## 2018-11-14 DIAGNOSIS — M54.2 CERVICALGIA: ICD-10-CM

## 2018-11-14 DIAGNOSIS — M47.22 OSTEOARTHRITIS OF SPINE WITH RADICULOPATHY, CERVICAL REGION: Primary | ICD-10-CM

## 2018-11-14 DIAGNOSIS — M54.81 OCCIPITAL NEURALGIA OF RIGHT SIDE: ICD-10-CM

## 2018-11-14 DIAGNOSIS — F07.81 POSTCONCUSSIVE SYNDROME: ICD-10-CM

## 2018-11-14 DIAGNOSIS — IMO0002 CHRONIC MIGRAINE: ICD-10-CM

## 2018-11-14 DIAGNOSIS — R76.8 POSITIVE ANA (ANTINUCLEAR ANTIBODY): ICD-10-CM

## 2018-11-14 DIAGNOSIS — A69.20 LYME DISEASE: Chronic | ICD-10-CM

## 2018-11-14 DIAGNOSIS — M79.18 MYOFASCIAL MUSCLE PAIN: ICD-10-CM

## 2018-11-14 PROBLEM — R22.1 NECK SWELLING: Status: RESOLVED | Noted: 2018-09-13 | Resolved: 2018-11-14

## 2018-11-14 PROBLEM — R29.2: Status: RESOLVED | Noted: 2018-09-13 | Resolved: 2018-11-14

## 2018-11-14 PROBLEM — R51.9 HEADACHE DISORDER: Status: RESOLVED | Noted: 2017-09-19 | Resolved: 2018-11-14

## 2018-11-14 PROCEDURE — 99215 OFFICE O/P EST HI 40 MIN: CPT | Performed by: PHYSICIAN ASSISTANT

## 2018-11-14 RX ORDER — METHOCARBAMOL 500 MG/1
TABLET, FILM COATED ORAL
Qty: 30 TABLET | Refills: 0 | Status: SHIPPED | OUTPATIENT
Start: 2018-11-14 | End: 2019-03-20 | Stop reason: CLARIF

## 2018-11-14 NOTE — PATIENT INSTRUCTIONS
Magnesium  coq10  Dr Chris Gimenez- discuss botox if the injection does not help, discuss cervical MRI and ask about ablation or epidural?  MRI chest versus XR?

## 2018-11-27 ENCOUNTER — OFFICE VISIT (OUTPATIENT)
Dept: FAMILY MEDICINE CLINIC | Facility: HOSPITAL | Age: 23
End: 2018-11-27
Payer: COMMERCIAL

## 2018-11-27 ENCOUNTER — HOSPITAL ENCOUNTER (OUTPATIENT)
Dept: RADIOLOGY | Facility: HOSPITAL | Age: 23
Discharge: HOME | End: 2018-11-27
Attending: INTERNAL MEDICINE
Payer: COMMERCIAL

## 2018-11-27 ENCOUNTER — TELEPHONE (OUTPATIENT)
Dept: FAMILY MEDICINE CLINIC | Facility: HOSPITAL | Age: 23
End: 2018-11-27

## 2018-11-27 VITALS
WEIGHT: 138.5 LBS | BODY MASS INDEX: 22.26 KG/M2 | HEIGHT: 66 IN | OXYGEN SATURATION: 98 % | TEMPERATURE: 97.8 F | DIASTOLIC BLOOD PRESSURE: 62 MMHG | SYSTOLIC BLOOD PRESSURE: 110 MMHG | HEART RATE: 104 BPM

## 2018-11-27 DIAGNOSIS — J04.2 ACUTE LARYNGOTRACHEITIS: ICD-10-CM

## 2018-11-27 DIAGNOSIS — R53.1 WEAKNESS: ICD-10-CM

## 2018-11-27 DIAGNOSIS — J01.90 ACUTE SINUSITIS: Primary | ICD-10-CM

## 2018-11-27 PROCEDURE — 3008F BODY MASS INDEX DOCD: CPT | Performed by: INTERNAL MEDICINE

## 2018-11-27 PROCEDURE — 99213 OFFICE O/P EST LOW 20 MIN: CPT | Performed by: INTERNAL MEDICINE

## 2018-11-27 PROCEDURE — 71046 X-RAY EXAM CHEST 2 VIEWS: CPT

## 2018-11-27 PROCEDURE — 1036F TOBACCO NON-USER: CPT | Performed by: INTERNAL MEDICINE

## 2018-11-27 RX ORDER — CEFUROXIME AXETIL 500 MG/1
500 TABLET ORAL EVERY 12 HOURS SCHEDULED
Qty: 14 TABLET | Refills: 0 | Status: SHIPPED | OUTPATIENT
Start: 2018-11-27 | End: 2018-12-04

## 2018-11-27 NOTE — PROGRESS NOTES
Assessment/Plan:             Problem List Items Addressed This Visit     None      Visit Diagnoses     Acute sinusitis    -  Primary    Weakness                Subjective:      Patient ID: Evin Bolaños  is a 24 y o  male    1  Cough and congestion- started  4 days ago after visiting with o ill grandmother with some sorethroat and sinus congestion- now has  Cough and thick green drainage at times  Began with discolored sputum in past 24 times  Had temp to 100 yesterday  Feeling very weak and  tired        The following portions of the patient's history were reviewed and updated as appropriate: allergies, current medications and problem list      Review of Systems   Constitutional: Positive for fatigue  HENT: Positive for postnasal drip and sore throat  Respiratory: Positive for cough  Neurological: Positive for weakness  All other systems reviewed and are negative  Objective:      Current Outpatient Prescriptions:     methocarbamol (ROBAXIN) 500 mg tablet, 1 tab qhs and up to TID if tolerated PRN headache, Disp: 30 tablet, Rfl: 0    multivitamin (THERAGRAN) TABS, Take 1 tablet by mouth daily, Disp: , Rfl:     Blood pressure 110/62, pulse 104, temperature 97 8 °F (36 6 °C), temperature source Tympanic, height 5' 6" (1 676 m), weight 62 8 kg (138 lb 8 oz), SpO2 98 %  Physical Exam   Constitutional: He is oriented to person, place, and time  He appears distressed  Ill appearing with intermittent cough   HENT:   Head: Normocephalic  Right Ear: External ear normal    Mouth/Throat: Oropharyngeal exudate present  Left tm dull  Frontal and maxillary sinus tenderness bilaterally   Neck:   Minimal anterior cervical lymophadenopathy   Cardiovascular: Normal rate and regular rhythm  Exam reveals no friction rub  No murmur heard  Pulmonary/Chest: He has wheezes  End exp wheezes on right   Abdominal: Soft  Bowel sounds are normal  He exhibits no distension  There is no tenderness  Lymphadenopathy:     He has cervical adenopathy  Neurological: He is alert and oriented to person, place, and time  Nursing note and vitals reviewed

## 2018-12-02 ENCOUNTER — TELEPHONE (OUTPATIENT)
Dept: OTHER | Facility: OTHER | Age: 23
End: 2018-12-02

## 2018-12-02 NOTE — TELEPHONE ENCOUNTER
Patient needs insurance referral for his Rheumatology appointment scheduled tomorrow 15  3 2018 with Dr Huber Gray  Please call back tomorrow

## 2018-12-03 ENCOUNTER — OFFICE VISIT (OUTPATIENT)
Dept: RHEUMATOLOGY | Facility: CLINIC | Age: 23
End: 2018-12-03
Payer: COMMERCIAL

## 2018-12-03 VITALS
DIASTOLIC BLOOD PRESSURE: 72 MMHG | HEART RATE: 98 BPM | WEIGHT: 138.8 LBS | HEIGHT: 66 IN | SYSTOLIC BLOOD PRESSURE: 109 MMHG | BODY MASS INDEX: 22.31 KG/M2

## 2018-12-03 DIAGNOSIS — R76.8 POSITIVE ANA (ANTINUCLEAR ANTIBODY): Primary | ICD-10-CM

## 2018-12-03 DIAGNOSIS — R89.4 ABNORMAL CELIAC ANTIBODY PANEL: ICD-10-CM

## 2018-12-03 DIAGNOSIS — M54.2 NECK PAIN: ICD-10-CM

## 2018-12-03 PROCEDURE — 99213 OFFICE O/P EST LOW 20 MIN: CPT | Performed by: INTERNAL MEDICINE

## 2018-12-03 NOTE — PROGRESS NOTES
Assessment and Plan:   Mr Junie Knowles is a 58-year-old  male with history significant for Lyme disease and chronic neck pain, who presents for follow-up of a positive HORTENCIA 1:80 nucleolar pattern  # Positive HORTENCIA  - Sukhjinder Alejandre presents today for follow-up of a positive HORTENCIA detected due to chronic neck pain  His labs were reviewed at today's visit, and are essentially unremarkable except for the abnormal celiac antibody profile, for which he has been referred to Gastroenterology  At this point of time I do not suspect that the positive HORTENCIA is clinically significant and I explained this to him in depth, there are no findings concerning for scleroderma in view of the positive nucleolar HORTENCIA  I would like to re-evaluate him in the office in 6 months, to assess for development of new symptoms  I do not suspect the neck pain to be related to an underlying autoimmune etiology  I advised him to continue follow-up with Neurology and PMR for consideration of nerve blocks or trigger point injections  - In view of the persistent shortness of breath, I recommend he continue follow-up with his primary care physician  His chest x-rays have been normal, and he was inquiring about further imaging studies, which I will defer to his PCP  - I will see him back in the office in 6 months  I advised him to call the office with any concerns  Plan:  Diagnoses and all orders for this visit:    Positive HORTENCIA (antinuclear antibody)    Abnormal celiac antibody panel    Neck pain      Activities as tolerated    Diet: low carb/low fat, more greens/vegetables, adequate hydration  Exercise: try to maintain a low impact exercise regimen as much as possible  Walk for 30 minutes a day for at least 3 days a week    Encouraged to maintain good sleep hygiene  Continue other medications as prescribed by PCP and other specialists        RTC in 6 months          HPI    INITIAL VISIT NOTE:  Mr Junie Knowles is a 58-year-old  male with history significant for Lyme disease, who presents for further evaluation of a positive HORTENCIA      Patient states his initial symptoms started about 2 years ago with neck pain that radiates up into his head  He mentions due to occupational exposure he has had multiple injuries to his neck, but states the symptoms started 2 months following the injury and has persisted since then  He states that he appreciates a swelling of the muscle located on the left side of his neck  The pain has been present constantly with slight variation in intensity during the day, but he mostly rates it at 6 to 7/10 in intensity  He feels there has been a slight progression in his overall symptoms  He has also had trigger point injections which did not have a significant benefit  He has also been seen by Neurology on multiple occasions for the same complaints and has undergone extensive testing, including ultrasound of his thyroid, CT head, CT soft tissue neck, CTA head and neck, MRI brain and MRI cervical spine which have all been unremarkable, except for nonspecific straightening of the cervical lordosis and mild spondylosis noted on cervical spine imaging  He also underwent testing for myasthenia gravis which was unremarkable  He has been tried on several medications including indomethacin, muscle relaxants, Cymbalta, Toradol, nortriptyline and gabapentin which did not help  Neurology also planned to start him on Lyrica but this was not approved by his insurance      In addition to the above-mentioned complaints he also reports chest pain for which he has undergone evaluation by Cardiology, including an echo and stress test which were normal   The etiology of his symptoms were not felt to be related to a cardiac origin  The chest pain is associated with shortness of breath, which can occur both with activities and at rest   He denies fevers, chills, unintentional weight loss, night sweats, cough or hemoptysis    He is unable to describe the character of the pain, but does not feel it similar to reflux symptoms  He does also report an occasional difficulty swallowing of dry foods  Over the same period of time he has also had a sensation of abdominal discomfort which appears to be generalized, and states that on occasion it is associated with an urge to defecate, which temporarily relieves the symptoms  He has also noticed a completely white stool on a few rare occasions  On average though he does have regular bowel movements without any mention of blood  He denies any yellowing of his skin or eyes, itching or dark urine      He denies fevers, chills, night sweats, unintentional weight loss, seizures, inflammatory eye disease, sicca symptoms, photosensitivity, psoriasis, skin tightening/thickening, GERD, mouth or nose ulcers, recurrent sinus infections, cough, hemoptysis, swollen glands, pleuritic chest pain, vomiting, blood in stools, blood clots, muscle weakness, numbness/tingling, joint pains/swelling, low back pain, Raynaud's or family history of autoimmune disease  He does report an occasional skin rash which was previously diagnosed as eczema, and for which he uses topical ointments  He reports the rash appears more prominently during the winter and has no association with sun exposure      Lyme testing in August 2018 was positive for IgG, with negative IgM  He has been evaluated by Infectious Disease and it is not felt that his symptoms are related to prior Lyme exposure  HORTENCIA was also checked and was found to be positive at 1:80 nucleolar pattern  CBC, CMP, TSH, ESR and CRP were normal       12/3/2018:  Patient presents for follow-up today    We reviewed his labs following the prior visit which were normal, including HORTENCIA specificity, rheumatoid factor, CCP antibody, HIV, hepatitis panel, vitamin-D, inflammation markers, ferritin, CK, complements, cardiolipin antibodies, beta 2 glycoprotein antibodies, Ace, urinalysis and urine protein creatinine ratio  This celiac antibody profile showed a weakly positive TTG I IgA  He has not been scheduled to see Gastroenterology yet  Patient states since the following visit his symptoms have more or less been the same  Over the past few days he was experiencing fevers with a productive cough, for which he was seen by his primary care physician and started on cefuroxime for 7 days  He still has a few doses left  A chest x-ray was done which was normal   He states with the onset of the upper respiratory tract infection his neck pain temporarily resolved  He has noticed it to recur in the past 1-2 days  It is mostly situated on the left side of his neck  Following his last office visit with me he was also seen by pain management and underwent a right occipital nerve block  He states that it did take the edge off his symptoms and they have been less frequent in nature on the right side of his neck  He may also return to see PMR for consideration of trigger point injections  He was recently started on Robaxin which he has been taking at night, and states it does relieve the pain as well as helps him sleep  He denies any specific joint pains or swelling otherwise, and does get an occasional eczematous rash  He also continues to experience intermittent shortness of breath both at rest and on exertion  He does inquire if it is possible to obtain an MRI of his chest   On occasion he will experience generalized abdominal discomfort associated with an urge to defecate, which temporarily relieves the symptoms  He denies any blood in his stools, but does note occasional mucus  He denies any skin thickening/tightening, GERD symptoms or Raynaud's  He does have occasional difficulty swallowing dry foods  He has never had any EGD or colonoscopy done  No new complaints otherwise      The following portions of the patient's history were reviewed and updated as appropriate: allergies, current medications, past family history, past medical history, past social history, past surgical history and problem list       Review of Systems  Constitutional: Negative for weight change, fevers, night sweats, fatigue  Positive for chills  ENT/Mouth: Negative for hearing changes, ear pain, nasal congestion, sinus pain, hoarseness, sore throat, rhinorrhea, swallowing difficulty  Eyes: Negative for pain, redness, discharge, vision changes  Cardiovascular: Positive for shortness of breath and chest pain  Positive for palpitations  Respiratory: Negative for cough, sputum, wheezing  Gastrointestinal: Negative for nausea, vomiting, diarrhea, constipation, pain, heartburn  Genitourinary: Negative for dysuria, urinary frequency, hematuria  Musculoskeletal: As per HPI  Skin: Negative for skin rash, color changes  Neuro: Negative for weakness, numbness, tingling, loss of consciousness  Positive for headache  Psych: Negative for anxiety, depression  Heme/Lymph: Negative for easy bruising, bleeding, lymphadenopathy  Past Medical History:   Diagnosis Date    Dizziness     Frequent headaches     Heart burn     Hx of concussion     last assessed 3/28/17    Hypertension     Palpitations     Postconcussive syndrome 12/14/2017    SOB (shortness of breath)     Swelling     Weakness        Past Surgical History:   Procedure Laterality Date    DENTAL SURGERY         Social History     Social History    Marital status: Single     Spouse name: N/A    Number of children: N/A    Years of education: N/A     Occupational History    Not on file       Social History Main Topics    Smoking status: Former Smoker     Types: Cigars    Smokeless tobacco: Former User      Comment: quit 2 yr    Alcohol use No    Drug use: No    Sexual activity: Not on file     Other Topics Concern    Not on file     Social History Narrative    Dental care regularly    Lives with family - Mom and Dad    Living situation - supportive and safe    No advance directives       Family History   Problem Relation Age of Onset    COPD Father     Hypertension Father     Stroke Father     Hyperlipidemia Father     Hypertension Maternal Grandfather     Cancer Paternal Grandfather     Hyperlipidemia Paternal Grandfather     Hypertension Paternal Uncle     Stroke Paternal Uncle        No Known Allergies      Current Outpatient Prescriptions:     cefuroxime (CEFTIN) 500 mg tablet, Take 1 tablet (500 mg total) by mouth every 12 (twelve) hours for 7 days, Disp: 14 tablet, Rfl: 0    methocarbamol (ROBAXIN) 500 mg tablet, 1 tab qhs and up to TID if tolerated PRN headache, Disp: 30 tablet, Rfl: 0    multivitamin (THERAGRAN) TABS, Take 1 tablet by mouth daily, Disp: , Rfl:       Objective:    Vitals:    12/03/18 1111   BP: 109/72   Pulse: 98   Weight: 63 kg (138 lb 12 8 oz)   Height: 5' 6" (1 676 m)       Physical Exam  General: Well appearing, well nourished, in no distress  Oriented x 3, normal mood and affect  Ambulating without difficulty  Skin: Good turgor, no rash, unusual bruising or prominent lesions  He has keratosis pilaris bilaterally on upper arms  He has a slight bumpy appearing rash in his flank region, which appears as keratosis pilaris  Hair: Normal texture and distribution  Nails: Normal color, no deformities  HEENT:  Head: Normocephalic, atraumatic  Eyes: Conjunctiva clear, sclera non-icteric, EOM intact  Nose: No external lesions, mucosa non-inflamed  Mouth: Mucous membranes moist, no mucosal lesions  Neck: Supple, thyroid non-enlarged and non-tender  Mild tenderness to palpation of the left side of his neck  He has discomfort on range of motion of neck  Extremities: No amputations or deformities, cyanosis, edema  Musculoskeletal:  No joint soft tissue swelling or tenderness appreciated  Negative fibromyalgia tender points  Neurologic: Alert and oriented  No focal neurological deficits appreciated  Psychiatric: Normal mood and affect  MARTHA Quiles    Rheumatology

## 2018-12-14 ENCOUNTER — OFFICE VISIT (OUTPATIENT)
Dept: FAMILY MEDICINE CLINIC | Facility: HOSPITAL | Age: 23
End: 2018-12-14
Payer: COMMERCIAL

## 2018-12-14 VITALS
TEMPERATURE: 97 F | OXYGEN SATURATION: 98 % | DIASTOLIC BLOOD PRESSURE: 80 MMHG | BODY MASS INDEX: 22.02 KG/M2 | HEART RATE: 106 BPM | SYSTOLIC BLOOD PRESSURE: 122 MMHG | HEIGHT: 66 IN | WEIGHT: 137 LBS

## 2018-12-14 DIAGNOSIS — M79.18 MYOFASCIAL MUSCLE PAIN: ICD-10-CM

## 2018-12-14 DIAGNOSIS — M54.2 ANTERIOR NECK PAIN: ICD-10-CM

## 2018-12-14 DIAGNOSIS — R13.10 DYSPHAGIA, UNSPECIFIED TYPE: Primary | ICD-10-CM

## 2018-12-14 DIAGNOSIS — R76.8 POSITIVE ANA (ANTINUCLEAR ANTIBODY): ICD-10-CM

## 2018-12-14 DIAGNOSIS — R00.2 PALPITATION: Chronic | ICD-10-CM

## 2018-12-14 PROCEDURE — 99214 OFFICE O/P EST MOD 30 MIN: CPT | Performed by: INTERNAL MEDICINE

## 2018-12-14 NOTE — ASSESSMENT & PLAN NOTE
Has more pulling sensation in anterior neck in past few weeks- has been there over a year- associated at times with swallowing issues that he noticed more recently

## 2018-12-14 NOTE — ASSESSMENT & PLAN NOTE
Seen last week by rheumatology in [place] Dr Darrell Gill- not felt to have other symptoms of scleroderma at this time but will have follow up appt with him in 6 months  Patient is now complaining of some swallowing difficulty- had trouble swallowing roast beef this week  Yogurt and liquids are easier to swallow    nucleolar pattern 1:80

## 2018-12-14 NOTE — PROGRESS NOTES
Assessment/Plan:             Problem List Items Addressed This Visit        Other    Palpitation (Chronic)     Has these daily with some associated sob         Myofascial muscle pain    Positive HORTENCIA (antinuclear antibody)     Seen last week by rheumatology in [place] Dr Teola Aschoff- not felt to have other symptoms of scleroderma at this time but will have follow up appt with him in 6 months  Patient is now complaining of some swallowing difficulty- had trouble swallowing roast beef this week  Yogurt and liquids are easier to swallow  nucleolar pattern 1:80         Anterior neck pain     Has more pulling sensation in anterior neck in past few weeks- has been there over a year- associated at times with swallowing issues that he noticed more recently           Other Visit Diagnoses     Dysphagia, unspecified type    -  Primary    Relevant Orders    FL barium swallow video w speech            Subjective:      Patient ID: Jack Trinh  is a 24 y o  male    1  Swallowing issues- both painful  In neck and some issues with feeling like he has to cough with solids- no sensation that food is getting stuck  No heartburn- Has gurgling in his upper abdomen - may be anytime during eating or after eating  No vomiting or heartburn  No dark stools  He is set up tosee gi at [facility] for evaluation of borderline positive celiac profile and swallowing issues-  2  Neck pain and ha disorder- seen by neurology- had nerve block injection with Dr Sr- helped pain decreased in frequency from all day- now may have simialr pain about every other day and not as long of time each episodes for an hour or two  If heavy lifting at work may have a flare and uses Methocarbimal ( robaxin)at bedtime which has helped  The following portions of the patient's history were reviewed and updated as appropriate: allergies, current medications and problem list      Review of Systems   Constitutional: Negative for chills and fever  Respiratory: Positive for shortness of breath  Negative for wheezing  No hx of asthma   Gastrointestinal: Negative for abdominal distention, abdominal pain, diarrhea and nausea  Neurological: Positive for dizziness  Dizziness h has improved sicne treatment of uri          Objective:      Current Outpatient Prescriptions:     methocarbamol (ROBAXIN) 500 mg tablet, 1 tab qhs and up to TID if tolerated PRN headache, Disp: 30 tablet, Rfl: 0    multivitamin (THERAGRAN) TABS, Take 1 tablet by mouth daily, Disp: , Rfl:     Blood pressure 122/80, pulse (!) 106, temperature (!) 97 °F (36 1 °C), height 5' 6" (1 676 m), weight 62 1 kg (137 lb), SpO2 98 %  Physical Exam   Constitutional: He is oriented to person, place, and time  No distress  No cough   HENT:   Head: Normocephalic  Right Ear: External ear normal    Mouth/Throat: No oropharyngeal exudate  No sinus tednerness   Neck:   Minimal anterior cervical lymophadenopathy   Cardiovascular: Normal rate and regular rhythm  Exam reveals no friction rub  No murmur heard  Pulmonary/Chest: He has no wheezes  End exp wheezes on right   Abdominal: Soft  Bowel sounds are normal  He exhibits no distension  There is no tenderness  Musculoskeletal: He exhibits tenderness  Anterior cervical tenderness and some occipital tenderness   Lymphadenopathy:     He has no cervical adenopathy  Neurological: He is alert and oriented to person, place, and time  Nursing note and vitals reviewed

## 2019-01-07 ENCOUNTER — TRANSCRIBE ORDERS (OUTPATIENT)
Dept: FAMILY MEDICINE CLINIC | Facility: HOSPITAL | Age: 24
End: 2019-01-07

## 2019-01-07 ENCOUNTER — TELEPHONE (OUTPATIENT)
Dept: FAMILY MEDICINE CLINIC | Facility: HOSPITAL | Age: 24
End: 2019-01-07

## 2019-01-07 DIAGNOSIS — M54.81 OCCIPITAL NEURALGIA, UNSPECIFIED LATERALITY: ICD-10-CM

## 2019-01-07 DIAGNOSIS — M79.18 DIFFUSE MYOFASCIAL PAIN SYNDROME: ICD-10-CM

## 2019-01-07 DIAGNOSIS — G44.221 CHRONIC TENSION-TYPE HEADACHE, INTRACTABLE: Primary | ICD-10-CM

## 2019-01-07 DIAGNOSIS — G44.211 INTRACTABLE EPISODIC TENSION-TYPE HEADACHE: ICD-10-CM

## 2019-01-07 DIAGNOSIS — M54.2 CERVICALGIA: ICD-10-CM

## 2019-01-07 DIAGNOSIS — M47.22 CERVICAL SPONDYLOSIS WITH RADICULOPATHY: Primary | ICD-10-CM

## 2019-01-07 NOTE — TELEPHONE ENCOUNTER
Lisa Angela from [place] [address] [facility] called  Patient now has [facility]   He has an appointment with them tomorrow  They need an ambulatory order put in the system to them  The diagnosis codes provided are M47 22, G44 211  M54 2, M54 81  M79 18  Any questions just call her    [phone]

## 2019-01-08 ENCOUNTER — OFFICE VISIT (OUTPATIENT)
Dept: NEUROLOGY | Facility: CLINIC | Age: 24
End: 2019-01-08
Payer: COMMERCIAL

## 2019-01-08 VITALS
SYSTOLIC BLOOD PRESSURE: 120 MMHG | HEIGHT: 66 IN | BODY MASS INDEX: 22.98 KG/M2 | DIASTOLIC BLOOD PRESSURE: 80 MMHG | HEART RATE: 95 BPM | WEIGHT: 143 LBS

## 2019-01-08 DIAGNOSIS — G43.719 INTRACTABLE CHRONIC MIGRAINE WITHOUT AURA AND WITHOUT STATUS MIGRAINOSUS: Primary | ICD-10-CM

## 2019-01-08 DIAGNOSIS — G24.3 SPASMODIC TORTICOLLIS AS LATE EFFECT OF TRAUMA: ICD-10-CM

## 2019-01-08 DIAGNOSIS — G44.321 INTRACTABLE CHRONIC POST-TRAUMATIC HEADACHE: ICD-10-CM

## 2019-01-08 DIAGNOSIS — G44.221 CHRONIC TENSION-TYPE HEADACHE, INTRACTABLE: ICD-10-CM

## 2019-01-08 DIAGNOSIS — M54.2 CERVICALGIA: ICD-10-CM

## 2019-01-08 PROCEDURE — 99215 OFFICE O/P EST HI 40 MIN: CPT | Performed by: PSYCHIATRY & NEUROLOGY

## 2019-01-08 NOTE — PROGRESS NOTES
Patient ID: Kamilah Ward  is a 24 y o  male  Assessment/Plan:    No problem-specific Assessment & Plan notes found for this encounter  Diagnoses and all orders for this visit:    Intractable chronic migraine without aura and without status migrainosus- remains with daily headaches at least at least a 5-6/10  Has failed multiple medications see below: Trial of:  -     Erenumab-aooe ([place]) 79 MG/ML SOAJ; One injection monthly SC in thigh or stomach   - he will call us once he receives this to schedule a nursing appointment for training   - If this does not help, we will consider Botox- in his case for a left laterocollis which is likely contributing to his headaches as well  Would inject, left SCM, left SC, left superior trap  Discussed potential med a/e including constipation  Chronic tension-type headache, intractable  -     Ambulatory referral to Neurology    Cervicalgia- failed trigger points, PT x 3 months, ONB mildly helpful only transiently  -     Ambulatory referral to Neurology    Intractable chronic post-traumatic headache  -     Erenumab-aooe ([place]) 79 MG/ML SOAJ; One injection monthly SC in thigh or stomach  Discussed potential med a/e  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- left laterocollis with a retrocollis component (milder degree but hypertonic mm), would inject- failed several medication see list below   left laterocollis-- left SCM, left SC- lesser dose, b/l trap- superior   Sent in for Botox authorization  Discussed potential med a/e including but not limited to dysphagia, neck weakness, dry mouth, rash  Discussed q3 month commitment, if he fails emgality  Will follow up in two months time- discussed above and he is in agreement with the above treatment plan  Pertinent neuroimaging including MR C spine and brain reviewed in PACS  Subjective:    HPI    Mr Jessica Payan is a pleasant 23 yo male seen in follow up:   Work injury occurred with 16 foot lumber lynette 1/2017; hit to the chin and whiplash injury of the neck, neck whipped backwards or extended      He tells me he still has the pain daily- with some days worse than the other, states worse with activity bright lights with associated off balance sensation  On average pain is 5-6/10  States if he is resting it will drop down to 3-4/10 and if active at least once a day increases to 8/10  Pain starts at base of skull and radiates upward right side worse than left in this regards but has left SCM - points to this and radiating mid sternal pain from the neck since injury  Has done PT for three months with no benefit  No photophobia, phonophobia, nausea, emesis, vertigo, vision or speech or cognition changes with headache/neck pain  States activity of any type makes it worse- states heavy lifting makes it worse  States he lifts in excess of a 100 lbs  He continues to work with no weakness or sensory loss noted for 12 hours a week as working longer exacerbates his symptoms  States his hearing in his left ear is more muffled- has seen an ENT with no problems noted per them  Medications tried and failed: tizanidine, nortriptyline, gabapentin- initially helped but wore off- no a/e  He has failed indocin as well, right onb with some benefit transient, cymbalta, toradol, baclofen, trigger points with minimal to no benefit, robaxin with some benefit but significant sedation thus takes it once nightly  MRI of the cervical spine 9/27/2018 with nonspecific straightening of the cervical lordosis without subluxation and mild spondylosis with a diffuse disc bulge at C4-5 without significant central canal or neural foraminal narrowing  There is no cord compression or cord signal abnormality  Since last seen by me:  Has seen rheumatology due to positive HORTENCIA and given other negative work up other than celiac profile was recommended no further aggressive work up    He is trying olive leaf extract per his chiropractor and he tells me this helps with his neck pain  He states the severity of his pain is reduced since starting this  He is also taking Magnesium now  States anxiety stable  States heart palpitations stable on metoprolol  Does have swallowing studies pending and GI appointment pending- trouble swallowing large solids for years worse since work injury  No issues with liquids  No other new sensory or motor deficits     Botox: left laterocollis-- left SCM, left SC, b/l trap- superior     The following portions of the patient's history were reviewed and updated as appropriate: allergies, current medications, past family history, past medical history, past social history, past surgical history and problem list, and ROS  Objective:    Blood pressure 120/80, pulse 95, height 5' 6" (1 676 m), weight 64 9 kg (143 lb)  Physical Exam   Constitutional: He appears well-developed and well-nourished  HENT:   Head: Normocephalic and atraumatic  Eyes: Pupils are equal, round, and reactive to light  Conjunctivae are normal    Neck: Normal range of motion  Neck supple  Cardiovascular: Normal rate and regular rhythm  Pulmonary/Chest: Effort normal    Musculoskeletal: Normal range of motion  Neurological: He is alert  He has normal strength and normal reflexes  Coordination normal    Nursing note and vitals reviewed  Neurological Exam  Mental Status  Alert  Oriented to person, place, time and situation  Recent and remote memory are intact  no dysarthria present  Language is fluent with no aphasia  Attention and concentration are normal  Fund of knowledge is appropriate for level of education  Cranial Nerves  CN II: Visual fields full to confrontation  Right funduscopic exam: not visualized  Left funduscopic exam: not visualized  CN III, IV, VI: Extraocular movements intact bilaterally  Pupils equal round and reactive to light bilaterally    CN V: Facial sensation is normal   CN VII: Full and symmetric facial movement  CN VIII: Hearing is normal   CN IX, X: Palate elevates symmetrically  Normal gag reflex  CN XI: Shoulder shrug strength is normal   CN XII: Tongue midline without atrophy or fasciculations  Left SCM tenderness hypertonicity  Left laterocollis with a mild retrocollis component  No significant occipital neuralgiform pain  Mid sternal pain reproducible on palpation  Motor   Normal muscle tone  Strength is 5/5 throughout all four extremities  Sensory  Sensation is intact to light touch, pinprick, vibration and proprioception in all four extremities  Light touch is normal in upper and lower extremities  Temperature is normal in upper and lower extremities  Vibration is normal in upper and lower extremities  No right-sided hemispatial neglect  No left-sided hemispatial neglect  Reflexes  Deep tendon reflexes are 2+ and symmetric in all four extremities with downgoing toes bilaterally  Coordination  Finger-to-nose, rapid alternating movements and heel-to-shin normal bilaterally without dysmetria  Gait  Casual gait is normal including stance, stride, and arm swing  ROS:    Review of Systems   Constitutional: Negative  Negative for appetite change and fever  HENT: Positive for trouble swallowing  Negative for hearing loss, tinnitus and voice change  Eyes: Negative  Negative for photophobia and pain  Respiratory: Positive for shortness of breath  Cardiovascular: Positive for palpitations  Gastrointestinal: Negative  Negative for nausea and vomiting  Endocrine: Negative  Negative for cold intolerance and heat intolerance  Genitourinary: Negative  Negative for dysuria, frequency and urgency  Musculoskeletal: Positive for myalgias and neck pain  Skin: Negative  Negative for rash  Neurological: Positive for headaches  Negative for dizziness, tremors, seizures, syncope, facial asymmetry, speech difficulty, weakness, light-headedness and numbness  Hematological: Negative  Does not bruise/bleed easily  Psychiatric/Behavioral: Positive for sleep disturbance  Negative for confusion and hallucinations

## 2019-01-11 ENCOUNTER — TELEPHONE (OUTPATIENT)
Dept: NEUROLOGY | Facility: CLINIC | Age: 24
End: 2019-01-11

## 2019-01-11 NOTE — TELEPHONE ENCOUNTER
Received incoming fax from [facility] stating PA required for Aimovig  Just to clarify, I saw prescription was sent for 2 pens  Is patient to inject 1 pen or 2 pens per 30 days?     Clinical team: John Yousif

## 2019-01-17 NOTE — TELEPHONE ENCOUNTER
pt called back and states that he [facility] to [facility] to [facility] delivery  they need a script  i called [phone]  i called in rx for aimovig 70mg 1 pen and 1 refill

## 2019-01-17 NOTE — TELEPHONE ENCOUNTER
pt called and states medication needs [facility] come [facility] and needs to be delivered to this home or our office  i advised him to have it shipped to his home  he also asked about injection training  i made him aware that once he recieves his medicationhe can call the office to schedule training    he would like to go to [facility] office for training

## 2019-01-23 ENCOUNTER — TELEPHONE (OUTPATIENT)
Dept: GASTROENTEROLOGY | Facility: AMBULARY SURGERY CENTER | Age: 24
End: 2019-01-23

## 2019-01-23 NOTE — TELEPHONE ENCOUNTER
Pt states that he received aimovig in the mail  Calling to schedule Aimovig training  Aimovig training scheduled 1/24/19 @ 1pm @ Jose [facility]'s office  Advised pt to bring the med w/ him and verbalized understanding  Emelyn SHIRLEY and Franklin Villanueva made aware

## 2019-01-24 ENCOUNTER — OFFICE VISIT (OUTPATIENT)
Dept: GASTROENTEROLOGY | Facility: AMBULARY SURGERY CENTER | Age: 24
End: 2019-01-24
Payer: COMMERCIAL

## 2019-01-24 ENCOUNTER — CLINICAL SUPPORT (OUTPATIENT)
Dept: NEUROLOGY | Facility: CLINIC | Age: 24
End: 2019-01-24

## 2019-01-24 VITALS
TEMPERATURE: 97.8 F | DIASTOLIC BLOOD PRESSURE: 68 MMHG | WEIGHT: 141.4 LBS | BODY MASS INDEX: 22.73 KG/M2 | RESPIRATION RATE: 16 BRPM | SYSTOLIC BLOOD PRESSURE: 122 MMHG | HEART RATE: 68 BPM | HEIGHT: 66 IN

## 2019-01-24 DIAGNOSIS — R19.4 CHANGE IN BOWEL HABITS: ICD-10-CM

## 2019-01-24 DIAGNOSIS — R10.13 EPIGASTRIC PAIN: Primary | ICD-10-CM

## 2019-01-24 DIAGNOSIS — G43.719 INTRACTABLE CHRONIC MIGRAINE WITHOUT AURA AND WITHOUT STATUS MIGRAINOSUS: Primary | ICD-10-CM

## 2019-01-24 PROCEDURE — 99204 OFFICE O/P NEW MOD 45 MIN: CPT | Performed by: INTERNAL MEDICINE

## 2019-01-24 RX ORDER — OMEPRAZOLE 40 MG/1
40 CAPSULE, DELAYED RELEASE ORAL DAILY
Qty: 30 CAPSULE | Refills: 2 | Status: SHIPPED | OUTPATIENT
Start: 2019-01-24 | End: 2019-09-04

## 2019-01-24 NOTE — ASSESSMENT & PLAN NOTE
Possible from peptic ulcer disease or gastric erosions  Rule out H pylori gastritis     -Patient was explained about the lifestyle and dietary modifications  Advised to avoid fatty foods, chocolates, caffeine, alcohol and any other triggering foods    Avoid eating for at least 3 hours before going to bed         -give a trial with Prilosec    -schedule for EGD

## 2019-01-24 NOTE — PROGRESS NOTES
Patient presents for injection training for Aimovig  Patient was educated with a demo injectable pen on how to inject with the auto-injector  Patient verbalized understanding how to inject himself with the demonstration provided to her  Patient was able to show reverse training with no questions or problems  Patient brought his own injection to the education visit today and was able to inject himself in the right thigh without any questions  Patient was able to tolerate injection well upon leaving the office today

## 2019-01-24 NOTE — PROGRESS NOTES
Consultation - [facility]  Jeremiah Luoisa  1995 male         Chief Complaint:  Epigastric pain, change in bowel habits    HPI:  50-year-old male with no significant past medical history reports having epigastric and lower chest pain for couple of years  Denies any NSAID use  He also reports having some chronic pain issues in the chest, neck and head  He also reports having change in bowel habits with episodes of mucousy loose bowels sometimes  Denies any blood in the stool  Good appetite, no recent weight loss  Denies any heartburn acid reflux  Denies any difficulty swallowing  REVIEW OF SYSTEMS: Review of Systems   Constitutional: Negative for activity change, appetite change, chills, diaphoresis, fatigue, fever and unexpected weight change  HENT: Negative for ear discharge, ear pain, facial swelling, hearing loss, nosebleeds, sore throat, tinnitus and voice change  Eyes: Negative for pain, discharge, redness, itching and visual disturbance  Respiratory: Negative for apnea, cough, chest tightness, shortness of breath and wheezing  Cardiovascular: Negative for chest pain and palpitations  Gastrointestinal:        As noted in HPI   Endocrine: Negative for cold intolerance, heat intolerance and polyuria  Genitourinary: Negative for difficulty urinating, dysuria, flank pain, hematuria and urgency  Musculoskeletal: Negative for arthralgias, back pain, gait problem, joint swelling and myalgias  Skin: Negative for rash and wound  Neurological: Negative for dizziness, tremors, seizures, speech difficulty, light-headedness, numbness and headaches  Hematological: Negative for adenopathy  Does not bruise/bleed easily  Psychiatric/Behavioral: Negative for agitation, behavioral problems and confusion  The patient is not nervous/anxious           Past Medical History:   Diagnosis Date    Dizziness     Frequent headaches     Heart burn     Hx of concussion last assessed 3/28/17    Hypertension     Palpitations     Postconcussive syndrome 12/14/2017    SOB (shortness of breath)     Swelling     Weakness       Past Surgical History:   Procedure Laterality Date    DENTAL SURGERY       Social History     Social History    Marital status: Single     Spouse name: N/A    Number of children: N/A    Years of education: N/A     Occupational History    Not on file  Social History Main Topics    Smoking status: Former Smoker     Types: Cigars    Smokeless tobacco: Former User      Comment: quit 2 yr    Alcohol use No    Drug use: No    Sexual activity: Not on file     Other Topics Concern    Not on file     Social History Narrative    Dental care regularly    Lives with family - Mom and Dad    Living situation - supportive and safe    No advance directives     Family History   Problem Relation Age of Onset    COPD Father     Hypertension Father     Stroke Father     Hyperlipidemia Father     Hypertension Maternal Grandfather     Cancer Paternal Grandfather     Hyperlipidemia Paternal Grandfather     Hypertension Paternal Collette Shutters     Stroke Paternal Uncle      Patient has no known allergies  Current Outpatient Prescriptions   Medication Sig Dispense Refill    Erenumab-aooe (AIMOVIG) 70 MG/ML SOAJ One injection monthly SC in thigh or stomach 2 pen 0    methocarbamol (ROBAXIN) 500 mg tablet 1 tab qhs and up to TID if tolerated PRN headache 30 tablet 0    multivitamin (THERAGRAN) TABS Take 1 tablet by mouth daily      Na Sulfate-K Sulfate-Mg Sulf (SUPREP BOWEL PREP KIT) 17 5-3 13-1 6 GM/177ML SOLN Take 2 Bottles by mouth see administration instructions Please follow the instructions from the office 2 Bottle 0    omeprazole (PriLOSEC) 40 MG capsule Take 1 capsule (40 mg total) by mouth daily 30 capsule 2     No current facility-administered medications for this visit          Blood pressure 122/68, pulse 68, temperature 97 8 °F (36 6 °C), temperature source Tympanic, resp  rate 16, height 5' 6" (1 676 m), weight 64 1 kg (141 lb 6 4 oz)  PHYSICAL EXAM: Physical Exam   Constitutional: He is oriented to person, place, and time  He appears well-developed  HENT:   Head: Normocephalic and atraumatic  Mouth/Throat: Oropharynx is clear and moist    Eyes: Pupils are equal, round, and reactive to light  Conjunctivae are normal  Right eye exhibits no discharge  Left eye exhibits no discharge  No scleral icterus  Neck: Neck supple  No JVD present  No tracheal deviation present  No thyromegaly present  Cardiovascular: Normal rate, regular rhythm, normal heart sounds and intact distal pulses  Exam reveals no gallop and no friction rub  No murmur heard  Pulmonary/Chest: Effort normal and breath sounds normal  No respiratory distress  He has no wheezes  He has no rales  He exhibits no tenderness  Abdominal: Soft  Bowel sounds are normal  He exhibits no distension and no mass  There is no tenderness  There is no rebound and no guarding  No hernia  Musculoskeletal: He exhibits no edema  Lymphadenopathy:     He has no cervical adenopathy  Neurological: He is alert and oriented to person, place, and time  Skin: Skin is warm and dry  No rash noted  No erythema  Psychiatric: He has a normal mood and affect   His behavior is normal  Thought content normal         Lab Results   Component Value Date    WBC 4 0 09/13/2018    HGB 15 9 09/13/2018    HCT 47 6 09/13/2018    MCV 90 09/13/2018     09/13/2018     Lab Results   Component Value Date    CALCIUM 9 8 07/31/2017     07/31/2017    K 4 6 09/13/2018    CO2 25 09/13/2018     09/13/2018    BUN 14 09/13/2018    CREATININE 1 24 07/31/2017     Lab Results   Component Value Date    AST 18 07/31/2017    ALKPHOS 63 07/31/2017    BILITOT 0 6 07/31/2017     Lab Results   Component Value Date    INR 0 96 01/28/2017    PROTIME 12 9 01/28/2017       Xr Chest Pa & Lateral    Result Date: 11/29/2018  Impression: No acute cardiopulmonary disease  Workstation performed: [id number]       ASSESSMENT & PLAN:    Epigastric pain  Possible from peptic ulcer disease or gastric erosions  Rule out H pylori gastritis     -Patient was explained about the lifestyle and dietary modifications  Advised to avoid fatty foods, chocolates, caffeine, alcohol and any other triggering foods  Avoid eating for at least 3 hours before going to bed         -give a trial with Prilosec    -schedule for EGD    Change in bowel habits  Symptoms appear to be most likely nonspecific functional from irritable bowel syndrome  Also possible from lactose or diet intolerance     -advised patient to maintain a diary of the foods when he has the symptoms    -check celiac disease panel    -Schedule for colonoscopy  -High-fiber diet     -Patient was given instructions about the colonoscopy prep     -Patient was explained about  the risks and benefits of the procedure  Risks including but not limited to bleeding, infection, perforation were explained in detail  Also explained about less than 100% sensitivity with the exam and other alternatives

## 2019-01-24 NOTE — H&P (VIEW-ONLY)
Consultation - [facility]  Pearletha Krabbe  1995 male         Chief Complaint:  Epigastric pain, change in bowel habits    HPI:  56-year-old male with no significant past medical history reports having epigastric and lower chest pain for couple of years  Denies any NSAID use  He also reports having some chronic pain issues in the chest, neck and head  He also reports having change in bowel habits with episodes of mucousy loose bowels sometimes  Denies any blood in the stool  Good appetite, no recent weight loss  Denies any heartburn acid reflux  Denies any difficulty swallowing  REVIEW OF SYSTEMS: Review of Systems   Constitutional: Negative for activity change, appetite change, chills, diaphoresis, fatigue, fever and unexpected weight change  HENT: Negative for ear discharge, ear pain, facial swelling, hearing loss, nosebleeds, sore throat, tinnitus and voice change  Eyes: Negative for pain, discharge, redness, itching and visual disturbance  Respiratory: Negative for apnea, cough, chest tightness, shortness of breath and wheezing  Cardiovascular: Negative for chest pain and palpitations  Gastrointestinal:        As noted in HPI   Endocrine: Negative for cold intolerance, heat intolerance and polyuria  Genitourinary: Negative for difficulty urinating, dysuria, flank pain, hematuria and urgency  Musculoskeletal: Negative for arthralgias, back pain, gait problem, joint swelling and myalgias  Skin: Negative for rash and wound  Neurological: Negative for dizziness, tremors, seizures, speech difficulty, light-headedness, numbness and headaches  Hematological: Negative for adenopathy  Does not bruise/bleed easily  Psychiatric/Behavioral: Negative for agitation, behavioral problems and confusion  The patient is not nervous/anxious           Past Medical History:   Diagnosis Date    Dizziness     Frequent headaches     Heart burn     Hx of concussion last assessed 3/28/17    Hypertension     Palpitations     Postconcussive syndrome 12/14/2017    SOB (shortness of breath)     Swelling     Weakness       Past Surgical History:   Procedure Laterality Date    DENTAL SURGERY       Social History     Social History    Marital status: Single     Spouse name: N/A    Number of children: N/A    Years of education: N/A     Occupational History    Not on file  Social History Main Topics    Smoking status: Former Smoker     Types: Cigars    Smokeless tobacco: Former User      Comment: quit 2 yr    Alcohol use No    Drug use: No    Sexual activity: Not on file     Other Topics Concern    Not on file     Social History Narrative    Dental care regularly    Lives with family - Mom and Dad    Living situation - supportive and safe    No advance directives     Family History   Problem Relation Age of Onset    COPD Father     Hypertension Father     Stroke Father     Hyperlipidemia Father     Hypertension Maternal Grandfather     Cancer Paternal Grandfather     Hyperlipidemia Paternal Grandfather     Hypertension Paternal Jony Ring     Stroke Paternal Uncle      Patient has no known allergies  Current Outpatient Prescriptions   Medication Sig Dispense Refill    Erenumab-aooe (AIMOVIG) 70 MG/ML SOAJ One injection monthly SC in thigh or stomach 2 pen 0    methocarbamol (ROBAXIN) 500 mg tablet 1 tab qhs and up to TID if tolerated PRN headache 30 tablet 0    multivitamin (THERAGRAN) TABS Take 1 tablet by mouth daily      Na Sulfate-K Sulfate-Mg Sulf (SUPREP BOWEL PREP KIT) 17 5-3 13-1 6 GM/177ML SOLN Take 2 Bottles by mouth see administration instructions Please follow the instructions from the office 2 Bottle 0    omeprazole (PriLOSEC) 40 MG capsule Take 1 capsule (40 mg total) by mouth daily 30 capsule 2     No current facility-administered medications for this visit          Blood pressure 122/68, pulse 68, temperature 97 8 °F (36 6 °C), temperature source Tympanic, resp  rate 16, height 5' 6" (1 676 m), weight 64 1 kg (141 lb 6 4 oz)  PHYSICAL EXAM: Physical Exam   Constitutional: He is oriented to person, place, and time  He appears well-developed  HENT:   Head: Normocephalic and atraumatic  Mouth/Throat: Oropharynx is clear and moist    Eyes: Pupils are equal, round, and reactive to light  Conjunctivae are normal  Right eye exhibits no discharge  Left eye exhibits no discharge  No scleral icterus  Neck: Neck supple  No JVD present  No tracheal deviation present  No thyromegaly present  Cardiovascular: Normal rate, regular rhythm, normal heart sounds and intact distal pulses  Exam reveals no gallop and no friction rub  No murmur heard  Pulmonary/Chest: Effort normal and breath sounds normal  No respiratory distress  He has no wheezes  He has no rales  He exhibits no tenderness  Abdominal: Soft  Bowel sounds are normal  He exhibits no distension and no mass  There is no tenderness  There is no rebound and no guarding  No hernia  Musculoskeletal: He exhibits no edema  Lymphadenopathy:     He has no cervical adenopathy  Neurological: He is alert and oriented to person, place, and time  Skin: Skin is warm and dry  No rash noted  No erythema  Psychiatric: He has a normal mood and affect   His behavior is normal  Thought content normal         Lab Results   Component Value Date    WBC 4 0 09/13/2018    HGB 15 9 09/13/2018    HCT 47 6 09/13/2018    MCV 90 09/13/2018     09/13/2018     Lab Results   Component Value Date    CALCIUM 9 8 07/31/2017     07/31/2017    K 4 6 09/13/2018    CO2 25 09/13/2018     09/13/2018    BUN 14 09/13/2018    CREATININE 1 24 07/31/2017     Lab Results   Component Value Date    AST 18 07/31/2017    ALKPHOS 63 07/31/2017    BILITOT 0 6 07/31/2017     Lab Results   Component Value Date    INR 0 96 01/28/2017    PROTIME 12 9 01/28/2017       Xr Chest Pa & Lateral    Result Date: 11/29/2018  Impression: No acute cardiopulmonary disease  Workstation performed: [id number]       ASSESSMENT & PLAN:    Epigastric pain  Possible from peptic ulcer disease or gastric erosions  Rule out H pylori gastritis     -Patient was explained about the lifestyle and dietary modifications  Advised to avoid fatty foods, chocolates, caffeine, alcohol and any other triggering foods  Avoid eating for at least 3 hours before going to bed         -give a trial with Prilosec    -schedule for EGD    Change in bowel habits  Symptoms appear to be most likely nonspecific functional from irritable bowel syndrome  Also possible from lactose or diet intolerance     -advised patient to maintain a diary of the foods when he has the symptoms    -check celiac disease panel    -Schedule for colonoscopy  -High-fiber diet     -Patient was given instructions about the colonoscopy prep     -Patient was explained about  the risks and benefits of the procedure  Risks including but not limited to bleeding, infection, perforation were explained in detail  Also explained about less than 100% sensitivity with the exam and other alternatives

## 2019-01-24 NOTE — LETTER
January 24, 2019     Jj Desai, [address], [address]  [address] [address]    Patient: Chet Wilkes  YOB: 1995   Date of Visit: 1/24/2019       Dear Dr Rivera Shown: Thank you for referring Shona Osullivan to me for evaluation  Below are my notes for this consultation  If you have questions, please do not hesitate to call me  I look forward to following your patient along with you  Sincerely,        Josy Moon MD        CC: No Recipients  Josy Moon MD  1/24/2019  4:06 PM  Sign at close encounter  Consultation - [address] [facility]  Chet Wilkes  1995 male         Chief Complaint:  Epigastric pain, change in bowel habits    HPI:  67-year-old male with no significant past medical history reports having epigastric and lower chest pain for couple of years  Denies any NSAID use  He also reports having some chronic pain issues in the chest, neck and head  He also reports having change in bowel habits with episodes of mucousy loose bowels sometimes  Denies any blood in the stool  Good appetite, no recent weight loss  Denies any heartburn acid reflux  Denies any difficulty swallowing  REVIEW OF SYSTEMS: Review of Systems   Constitutional: Negative for activity change, appetite change, chills, diaphoresis, fatigue, fever and unexpected weight change  HENT: Negative for ear discharge, ear pain, facial swelling, hearing loss, nosebleeds, sore throat, tinnitus and voice change  Eyes: Negative for pain, discharge, redness, itching and visual disturbance  Respiratory: Negative for apnea, cough, chest tightness, shortness of breath and wheezing  Cardiovascular: Negative for chest pain and palpitations  Gastrointestinal:        As noted in HPI   Endocrine: Negative for cold intolerance, heat intolerance and polyuria  Genitourinary: Negative for difficulty urinating, dysuria, flank pain, hematuria and urgency     Musculoskeletal: Negative for arthralgias, back pain, gait problem, joint swelling and myalgias  Skin: Negative for rash and wound  Neurological: Negative for dizziness, tremors, seizures, speech difficulty, light-headedness, numbness and headaches  Hematological: Negative for adenopathy  Does not bruise/bleed easily  Psychiatric/Behavioral: Negative for agitation, behavioral problems and confusion  The patient is not nervous/anxious  ***    Past Medical History:   Diagnosis Date    Dizziness     Frequent headaches     Heart burn     Hx of concussion     last assessed 3/28/17    Hypertension     Palpitations     Postconcussive syndrome 12/14/2017    SOB (shortness of breath)     Swelling     Weakness       Past Surgical History:   Procedure Laterality Date    DENTAL SURGERY       Social History     Social History    Marital status: Single     Spouse name: N/A    Number of children: N/A    Years of education: N/A     Occupational History    Not on file  Social History Main Topics    Smoking status: Former Smoker     Types: Cigars    Smokeless tobacco: Former User      Comment: quit 2 yr    Alcohol use No    Drug use: No    Sexual activity: Not on file     Other Topics Concern    Not on file     Social History Narrative    Dental care regularly    Lives with family - Mom and Dad    Living situation - supportive and safe    No advance directives     Family History   Problem Relation Age of Onset    COPD Father     Hypertension Father     Stroke Father     Hyperlipidemia Father     Hypertension Maternal Grandfather     Cancer Paternal Grandfather     Hyperlipidemia Paternal Grandfather     Hypertension Paternal Shayy Clinton     Stroke Paternal Uncle      Patient has no known allergies    Current Outpatient Prescriptions   Medication Sig Dispense Refill    Erenumab-aooe (AIMOVIG) 70 MG/ML SOAJ One injection monthly SC in thigh or stomach 2 pen 0    methocarbamol (ROBAXIN) 500 mg tablet 1 tab qhs and up to TID if tolerated PRN headache 30 tablet 0    multivitamin (THERAGRAN) TABS Take 1 tablet by mouth daily      Na Sulfate-K Sulfate-Mg Sulf (SUPREP BOWEL PREP KIT) 17 5-3 13-1 6 GM/177ML SOLN Take 2 Bottles by mouth see administration instructions Please follow the instructions from the office 2 Bottle 0    omeprazole (PriLOSEC) 40 MG capsule Take 1 capsule (40 mg total) by mouth daily 30 capsule 2     No current facility-administered medications for this visit  Blood pressure 122/68, pulse 68, temperature 97 8 °F (36 6 °C), temperature source Tympanic, resp  rate 16, height 5' 6" (1 676 m), weight 64 1 kg (141 lb 6 4 oz)  PHYSICAL EXAM: Physical Exam   Constitutional: He is oriented to person, place, and time  He appears well-developed  HENT:   Head: Normocephalic and atraumatic  Mouth/Throat: Oropharynx is clear and moist    Eyes: Pupils are equal, round, and reactive to light  Conjunctivae are normal  Right eye exhibits no discharge  Left eye exhibits no discharge  No scleral icterus  Neck: Neck supple  No JVD present  No tracheal deviation present  No thyromegaly present  Cardiovascular: Normal rate, regular rhythm, normal heart sounds and intact distal pulses  Exam reveals no gallop and no friction rub  No murmur heard  Pulmonary/Chest: Effort normal and breath sounds normal  No respiratory distress  He has no wheezes  He has no rales  He exhibits no tenderness  Abdominal: Soft  Bowel sounds are normal  He exhibits no distension and no mass  There is no tenderness  There is no rebound and no guarding  No hernia  Musculoskeletal: He exhibits no edema  Lymphadenopathy:     He has no cervical adenopathy  Neurological: He is alert and oriented to person, place, and time  Skin: Skin is warm and dry  No rash noted  No erythema  Psychiatric: He has a normal mood and affect   His behavior is normal  Thought content normal     ***    Lab Results   Component Value Date    WBC 4 0 09/13/2018    HGB 15 9 09/13/2018    HCT 47 6 09/13/2018    MCV 90 09/13/2018     09/13/2018     Lab Results   Component Value Date    CALCIUM 9 8 07/31/2017     07/31/2017    K 4 6 09/13/2018    CO2 25 09/13/2018     09/13/2018    BUN 14 09/13/2018    CREATININE 1 24 07/31/2017     Lab Results   Component Value Date    AST 18 07/31/2017    ALKPHOS 63 07/31/2017    BILITOT 0 6 07/31/2017     Lab Results   Component Value Date    INR 0 96 01/28/2017    PROTIME 12 9 01/28/2017       Xr Chest Pa & Lateral    Result Date: 11/29/2018  Impression: No acute cardiopulmonary disease  Workstation performed: [id number]       ASSESSMENT & PLAN:    Epigastric pain  Possible from peptic ulcer disease or gastric erosions  Rule out H pylori gastritis     -Patient was explained about the lifestyle and dietary modifications  Advised to avoid fatty foods, chocolates, caffeine, alcohol and any other triggering foods  Avoid eating for at least 3 hours before going to bed         -give a trial with Prilosec    -schedule for EGD    Change in bowel habits  Symptoms appear to be most likely nonspecific functional from irritable bowel syndrome  Also possible from lactose or diet intolerance     -advised patient to maintain a diary of the foods when he has the symptoms    -check celiac disease panel    -Schedule for colonoscopy  -High-fiber diet     -Patient was given instructions about the colonoscopy prep     -Patient was explained about  the risks and benefits of the procedure  Risks including but not limited to bleeding, infection, perforation were explained in detail  Also explained about less than 100% sensitivity with the exam and other alternatives

## 2019-01-24 NOTE — ASSESSMENT & PLAN NOTE
Symptoms appear to be most likely nonspecific functional from irritable bowel syndrome  Also possible from lactose or diet intolerance     -advised patient to maintain a diary of the foods when he has the symptoms    -check celiac disease panel    -Schedule for colonoscopy  -High-fiber diet     -Patient was given instructions about the colonoscopy prep     -Patient was explained about  the risks and benefits of the procedure  Risks including but not limited to bleeding, infection, perforation were explained in detail  Also explained about less than 100% sensitivity with the exam and other alternatives

## 2019-01-26 LAB
ENDOMYSIUM IGA SER QL: NEGATIVE
GLIADIN PEPTIDE IGA SER-ACNC: 4 UNITS (ref 0–19)
GLIADIN PEPTIDE IGG SER-ACNC: 3 UNITS (ref 0–19)
IGA SERPL-MCNC: 242 MG/DL (ref 90–386)
TTG IGA SER-ACNC: 11 U/ML (ref 0–3)
TTG IGG SER-ACNC: <2 U/ML (ref 0–5)

## 2019-02-04 ENCOUNTER — ANESTHESIA EVENT (OUTPATIENT)
Dept: PERIOP | Facility: AMBULARY SURGERY CENTER | Age: 24
End: 2019-02-04
Payer: COMMERCIAL

## 2019-02-14 ENCOUNTER — HOSPITAL ENCOUNTER (OUTPATIENT)
Facility: AMBULARY SURGERY CENTER | Age: 24
Setting detail: OUTPATIENT SURGERY
Discharge: HOME | End: 2019-02-14
Attending: INTERNAL MEDICINE | Admitting: INTERNAL MEDICINE
Payer: COMMERCIAL

## 2019-02-14 ENCOUNTER — ANESTHESIA (OUTPATIENT)
Dept: PERIOP | Facility: AMBULARY SURGERY CENTER | Age: 24
End: 2019-02-14
Payer: COMMERCIAL

## 2019-02-14 VITALS
SYSTOLIC BLOOD PRESSURE: 107 MMHG | TEMPERATURE: 98.1 F | OXYGEN SATURATION: 100 % | RESPIRATION RATE: 16 BRPM | DIASTOLIC BLOOD PRESSURE: 58 MMHG | HEART RATE: 66 BPM | WEIGHT: 128 LBS | BODY MASS INDEX: 20.57 KG/M2 | HEIGHT: 66 IN

## 2019-02-14 DIAGNOSIS — R10.13 EPIGASTRIC PAIN: ICD-10-CM

## 2019-02-14 DIAGNOSIS — R19.4 CHANGE IN BOWEL HABITS: ICD-10-CM

## 2019-02-14 PROCEDURE — 88305 TISSUE EXAM BY PATHOLOGIST: CPT | Performed by: PATHOLOGY

## 2019-02-14 PROCEDURE — 45380 COLONOSCOPY AND BIOPSY: CPT | Performed by: INTERNAL MEDICINE

## 2019-02-14 PROCEDURE — 43239 EGD BIOPSY SINGLE/MULTIPLE: CPT | Performed by: INTERNAL MEDICINE

## 2019-02-14 RX ORDER — PROPOFOL 10 MG/ML
INJECTION, EMULSION INTRAVENOUS AS NEEDED
Status: DISCONTINUED | OUTPATIENT
Start: 2019-02-14 | End: 2019-02-14 | Stop reason: SURG

## 2019-02-14 RX ORDER — LIDOCAINE HYDROCHLORIDE 10 MG/ML
INJECTION, SOLUTION INFILTRATION; PERINEURAL AS NEEDED
Status: DISCONTINUED | OUTPATIENT
Start: 2019-02-14 | End: 2019-02-14 | Stop reason: SURG

## 2019-02-14 RX ORDER — SODIUM CHLORIDE 9 MG/ML
125 INJECTION, SOLUTION INTRAVENOUS CONTINUOUS
Status: DISCONTINUED | OUTPATIENT
Start: 2019-02-14 | End: 2019-02-14 | Stop reason: HOSPADM

## 2019-02-14 RX ADMIN — SODIUM CHLORIDE: 0.9 INJECTION, SOLUTION INTRAVENOUS at 12:49

## 2019-02-14 RX ADMIN — PROPOFOL 50 MG: 10 INJECTION, EMULSION INTRAVENOUS at 13:58

## 2019-02-14 RX ADMIN — LIDOCAINE HYDROCHLORIDE ANHYDROUS 50 MG: 10 INJECTION, SOLUTION INFILTRATION at 13:56

## 2019-02-14 RX ADMIN — PROPOFOL 150 MG: 10 INJECTION, EMULSION INTRAVENOUS at 13:56

## 2019-02-14 RX ADMIN — PROPOFOL 50 MG: 10 INJECTION, EMULSION INTRAVENOUS at 14:09

## 2019-02-14 NOTE — ANESTHESIA PREPROCEDURE EVALUATION
Review of Systems/Medical History          Cardiovascular  Hypertension ,    Pulmonary  Negative pulmonary ROS        GI/Hepatic  Negative GI/hepatic ROS          Negative  ROS        Endo/Other  Negative endo/other ROS      GYN  Negative gynecology ROS          Hematology   Musculoskeletal    Arthritis     Neurology      Comment: H/o concussion Psychology   Negative psychology ROS                   Anesthesia Plan  ASA Score- 2     Anesthesia Type- IV sedation with anesthesia with ASA Monitors  Additional Monitors:   Airway Plan:     Comment: IV sedation,  standard ASA monitors  Risks and benefits discussed with patient; patient consented and agrees to proceed  I saw and evaluated the patient  If seen with CRNA, we have discussed the anesthetic plan and I am in agreement that the plan is appropriate for the patient        Plan Factors-    Induction- intravenous  Postoperative Plan-     Informed Consent- Anesthetic plan and risks discussed with patient

## 2019-02-14 NOTE — OP NOTE
COLONOSCOPY    PROCEDURE: Colonoscopy/ Biopsy    INDICATIONS: Diarrhea    POST-OP DIAGNOSIS: See the impression below    SEDATION: Monitored anesthesia care, check anesthesia records    PRIOR COLONOSCOPY: No prior colonoscopy  CONSENT:  Informed consent was obtained for the procedure, including sedation after explaining the risks and benefits of the procedure  Risks including but not limited to bleeding, perforation, infection, aspiration were discussed in detail  Also explained about less than 100%$ sensitivity with the exam and other alternatives  PREPARATION:   EKG tracing, pulse oximetry, blood pressure were monitored throughout the procedure  Patient was identified by myself both verbally and by visual inspection of ID band  DESCRIPTION:   Patient was placed in the left lateral decubitus position and was sedated with the above medication  Digital rectal examination was performed  The colonoscope was introduced in to the anal canal and advanced up to terminal ileum  A careful inspection was made as the colonoscope was withdrawn, including a retroflexed view of the rectum; findings and interventions are described below  Appropriate photodocumentation was obtained  The quality of the colonic preparation was adequate  FINDINGS:    1  Terminal ileum-normal mucosa    2  Cecum and ileocecal valve-normal    3  The rest of the colon mucosa is normal   Random biopsies were done  IMPRESSIONS:      As above    RECOMMENDATIONS:    Check pathology      COMPLICATIONS:  None; patient tolerated the procedure well      DISPOSITION: PACU           CONDITION: Stable

## 2019-02-14 NOTE — ANESTHESIA POSTPROCEDURE EVALUATION
Post-Op Assessment Note    CV Status:  Stable    Pain management: adequate     Mental Status:  Alert and awake   Hydration Status:  Euvolemic   PONV Controlled:  Controlled   Airway Patency:  Patent   Post Op Vitals Reviewed: Yes      Staff: CRNA           BP   105/54   Temp      Pulse 75   Resp 16   SpO2 98

## 2019-02-14 NOTE — DISCHARGE INSTR - AVS FIRST PAGE
ESOPHAGOGASTRODUODENOSCOPY    PROCEDURE: EGD/ Biopsy    INDICATIONS: Abdominal pain, Epigastric and Diarrhea    POST-OP DIAGNOSIS: See the impression below    SEDATION: Monitored anesthesia care, check anesthesia records    CONSENT:  Informed consent was obtained for the procedure, including sedation after explaining the risks and benefits of the procedure  Risks including but not limited to bleeding, perforation, infection, aspiration were discussed in detail  Also explained about less than 100% sensitivity with the exam and other alternatives  PREPARATION:   EKG tracing, pulse oximetry, blood pressure were monitored throughout the procedure  Patient was identified by myself both verbally and by visual inspection of ID band  DESCRIPTION:   Patient was placed in the left lateral decubitus position and was sedated with the above medication  The gastroscope was introduced in to the oropharynx and the esophagus was intubated under direct visualization  Scope was passed down the esophagus up to 2nd part of the duodenum  A careful inspection was made as the gastroscope was withdrawn, including a retroflexed view of the stomach; findings and interventions are described below  FINDINGS:    #1  Esophagus and GEJ- normal mucosa    #2  Stomach- normal   Biopsies done to check for H pylori    #3  Duodenum- normal   Biopsies done to rule out celiac         IMPRESSIONS:      As above    RECOMMENDATIONS:     Check pathology    COMPLICATIONS:  None; patient tolerated the procedure well  DISPOSITION: PACU           CONDITION: Stable        COLONOSCOPY    PROCEDURE: Colonoscopy/ Biopsy    INDICATIONS: Diarrhea    POST-OP DIAGNOSIS: See the impression below    SEDATION: Monitored anesthesia care, check anesthesia records    PRIOR COLONOSCOPY: No prior colonoscopy  CONSENT:  Informed consent was obtained for the procedure, including sedation after explaining the risks and benefits of the procedure   Risks including but not limited to bleeding, perforation, infection, aspiration were discussed in detail  Also explained about less than 100%$ sensitivity with the exam and other alternatives  PREPARATION:   EKG tracing, pulse oximetry, blood pressure were monitored throughout the procedure  Patient was identified by myself both verbally and by visual inspection of ID band  DESCRIPTION:   Patient was placed in the left lateral decubitus position and was sedated with the above medication  Digital rectal examination was performed  The colonoscope was introduced in to the anal canal and advanced up to terminal ileum  A careful inspection was made as the colonoscope was withdrawn, including a retroflexed view of the rectum; findings and interventions are described below  Appropriate photodocumentation was obtained  The quality of the colonic preparation was adequate  FINDINGS:    1  Terminal ileum-normal mucosa    2  Cecum and ileocecal valve-normal    3  The rest of the colon mucosa is normal   Random biopsies were done  IMPRESSIONS:      As above    RECOMMENDATIONS:    Check pathology      COMPLICATIONS:  None; patient tolerated the procedure well      DISPOSITION: PACU           CONDITION: Stable

## 2019-02-14 NOTE — OP NOTE
ESOPHAGOGASTRODUODENOSCOPY    PROCEDURE: EGD/ Biopsy    INDICATIONS: Abdominal pain, Epigastric and Diarrhea    POST-OP DIAGNOSIS: See the impression below    SEDATION: Monitored anesthesia care, check anesthesia records    CONSENT:  Informed consent was obtained for the procedure, including sedation after explaining the risks and benefits of the procedure  Risks including but not limited to bleeding, perforation, infection, aspiration were discussed in detail  Also explained about less than 100% sensitivity with the exam and other alternatives  PREPARATION:   EKG tracing, pulse oximetry, blood pressure were monitored throughout the procedure  Patient was identified by myself both verbally and by visual inspection of ID band  DESCRIPTION:   Patient was placed in the left lateral decubitus position and was sedated with the above medication  The gastroscope was introduced in to the oropharynx and the esophagus was intubated under direct visualization  Scope was passed down the esophagus up to 2nd part of the duodenum  A careful inspection was made as the gastroscope was withdrawn, including a retroflexed view of the stomach; findings and interventions are described below  FINDINGS:    #1  Esophagus and GEJ- normal mucosa    #2  Stomach- normal   Biopsies done to check for H pylori    #3  Duodenum- normal   Biopsies done to rule out celiac         IMPRESSIONS:      As above    RECOMMENDATIONS:     Check pathology    COMPLICATIONS:  None; patient tolerated the procedure well            DISPOSITION: PACU           CONDITION: Stable

## 2019-02-19 ENCOUNTER — TELEPHONE (OUTPATIENT)
Dept: NEUROLOGY | Facility: CLINIC | Age: 24
End: 2019-02-19

## 2019-02-19 DIAGNOSIS — G43.719 INTRACTABLE CHRONIC MIGRAINE WITHOUT AURA AND WITHOUT STATUS MIGRAINOSUS: ICD-10-CM

## 2019-02-19 DIAGNOSIS — G44.321 INTRACTABLE CHRONIC POST-TRAUMATIC HEADACHE: ICD-10-CM

## 2019-02-19 NOTE — TELEPHONE ENCOUNTER
I would have him do 140 mg SC which would be two injections for the next dose to see if more effective  Please ask him if he tried Topamax?  Any hx kidney stones? (I do not see that he did)    Thanks

## 2019-02-19 NOTE — TELEPHONE ENCOUNTER
Pt called w/ an update  States that he started Aimovig last week of January, not noticed any difference  Still getting daily migraines  Unsure how many migraines he gets in a week  Location/Description: throbbing pain, sharp pain, bilateral in the frontal area, bilateral in the occipital area  Pressure behind his ear  No recent illness  Pain scale: 5/10 now but it can get to 10/10  Associated symptoms:none  Precipitating factors:unsure       Current migraine medications are confirmed as:  aimovig monthly  Methocarbamol 500 mg 1 tab bedtime as needed    I did advised pt to give more time to see if aimovig is effective since he only had 1 inj  Next dose due next week  Any other recommendation?         [phone]

## 2019-02-20 DIAGNOSIS — G43.719 INTRACTABLE CHRONIC MIGRAINE WITHOUT AURA AND WITHOUT STATUS MIGRAINOSUS: Primary | ICD-10-CM

## 2019-02-20 NOTE — TELEPHONE ENCOUNTER
Called and advised pt of all of the below  He verbalized clear understanding    -Pt denies h/o kidney stones  But his mom had kidney stones  -Agreeable to try aimovig 140 mg (2 pens)  [facility]   -decline topamax at this time         Thanks

## 2019-02-21 NOTE — TELEPHONE ENCOUNTER
[facility] if [facility]ovig dose is increased from 1 pen to 2 pens  Advised Fe of the below and verbalized understanding  At this time, no PA is required  She will have to send rx first to processing dept and if it requires PA, they will contact our office

## 2019-03-08 ENCOUNTER — OFFICE VISIT (OUTPATIENT)
Dept: NEUROLOGY | Facility: CLINIC | Age: 24
End: 2019-03-08
Payer: COMMERCIAL

## 2019-03-08 VITALS
WEIGHT: 138 LBS | HEIGHT: 66 IN | SYSTOLIC BLOOD PRESSURE: 102 MMHG | DIASTOLIC BLOOD PRESSURE: 70 MMHG | BODY MASS INDEX: 22.18 KG/M2 | HEART RATE: 94 BPM

## 2019-03-08 DIAGNOSIS — M79.18 MYOFASCIAL PAIN: ICD-10-CM

## 2019-03-08 DIAGNOSIS — G24.3 SPASMODIC TORTICOLLIS AS LATE EFFECT OF TRAUMA: ICD-10-CM

## 2019-03-08 DIAGNOSIS — G44.321 INTRACTABLE CHRONIC POST-TRAUMATIC HEADACHE: Primary | ICD-10-CM

## 2019-03-08 DIAGNOSIS — G44.321 INTRACTABLE CHRONIC POST-TRAUMATIC HEADACHE: ICD-10-CM

## 2019-03-08 DIAGNOSIS — M54.81 OCCIPITAL NEURALGIA OF RIGHT SIDE: ICD-10-CM

## 2019-03-08 DIAGNOSIS — M54.2 ANTERIOR NECK PAIN: ICD-10-CM

## 2019-03-08 DIAGNOSIS — M79.18 MYOFASCIAL MUSCLE PAIN: ICD-10-CM

## 2019-03-08 PROCEDURE — 20553 NJX 1/MLT TRIGGER POINTS 3/>: CPT | Performed by: PSYCHIATRY & NEUROLOGY

## 2019-03-08 PROCEDURE — 99214 OFFICE O/P EST MOD 30 MIN: CPT | Performed by: PSYCHIATRY & NEUROLOGY

## 2019-03-08 RX ORDER — VENLAFAXINE HYDROCHLORIDE 75 MG/1
75 CAPSULE, EXTENDED RELEASE ORAL DAILY
Qty: 30 CAPSULE | Refills: 2 | Status: SHIPPED | OUTPATIENT
Start: 2019-03-08 | End: 2019-09-04

## 2019-03-08 RX ORDER — VENLAFAXINE HYDROCHLORIDE 37.5 MG/1
37.5 CAPSULE, EXTENDED RELEASE ORAL DAILY
Qty: 7 CAPSULE | Refills: 0 | Status: SHIPPED | OUTPATIENT
Start: 2019-03-08 | End: 2019-09-04

## 2019-03-08 RX ORDER — BUPIVACAINE HYDROCHLORIDE 2.5 MG/ML
3.5 INJECTION, SOLUTION INFILTRATION; PERINEURAL ONCE
Status: COMPLETED | OUTPATIENT
Start: 2019-03-08 | End: 2019-03-08

## 2019-03-08 RX ORDER — VENLAFAXINE HYDROCHLORIDE 37.5 MG/1
CAPSULE, EXTENDED RELEASE ORAL
Qty: 60 CAPSULE | Refills: 2 | Status: SHIPPED | OUTPATIENT
Start: 2019-03-08 | End: 2019-03-08 | Stop reason: SDUPTHER

## 2019-03-08 RX ORDER — KETOROLAC TROMETHAMINE 10 MG/1
TABLET, FILM COATED ORAL
Qty: 10 TABLET | Refills: 1 | Status: SHIPPED | OUTPATIENT
Start: 2019-03-08 | End: 2019-03-20 | Stop reason: ALTCHOICE

## 2019-03-08 RX ORDER — TRIAMCINOLONE ACETONIDE 40 MG/ML
40 INJECTION, SUSPENSION INTRA-ARTICULAR; INTRAMUSCULAR ONCE
Status: COMPLETED | OUTPATIENT
Start: 2019-03-08 | End: 2019-03-08

## 2019-03-08 RX ADMIN — TRIAMCINOLONE ACETONIDE 40 MG: 40 INJECTION, SUSPENSION INTRA-ARTICULAR; INTRAMUSCULAR at 10:26

## 2019-03-08 RX ADMIN — BUPIVACAINE HYDROCHLORIDE 3.5 ML: 2.5 INJECTION, SOLUTION INFILTRATION; PERINEURAL at 10:25

## 2019-03-08 NOTE — TELEPHONE ENCOUNTER
Pharm called and states that insurance will not cover 2 tabs daily  I entered a new script for 37 5mg 1 tab x 7 days and 75mg 1 tab daily   Please sign off

## 2019-03-20 ENCOUNTER — OFFICE VISIT (OUTPATIENT)
Dept: FAMILY MEDICINE CLINIC | Facility: HOSPITAL | Age: 24
End: 2019-03-20
Payer: COMMERCIAL

## 2019-03-20 VITALS
WEIGHT: 135 LBS | OXYGEN SATURATION: 99 % | SYSTOLIC BLOOD PRESSURE: 106 MMHG | DIASTOLIC BLOOD PRESSURE: 70 MMHG | BODY MASS INDEX: 21.69 KG/M2 | HEIGHT: 66 IN | HEART RATE: 72 BPM

## 2019-03-20 DIAGNOSIS — R00.2 PALPITATION: Chronic | ICD-10-CM

## 2019-03-20 DIAGNOSIS — M54.2 ANTERIOR NECK PAIN: Primary | ICD-10-CM

## 2019-03-20 DIAGNOSIS — IMO0002 CHRONIC MIGRAINE: ICD-10-CM

## 2019-03-20 DIAGNOSIS — R10.13 EPIGASTRIC PAIN: ICD-10-CM

## 2019-03-20 PROBLEM — R06.02 SHORTNESS OF BREATH: Status: RESOLVED | Noted: 2018-09-25 | Resolved: 2019-03-20

## 2019-03-20 PROCEDURE — 99213 OFFICE O/P EST LOW 20 MIN: CPT | Performed by: INTERNAL MEDICINE

## 2019-03-20 PROCEDURE — 3008F BODY MASS INDEX DOCD: CPT | Performed by: INTERNAL MEDICINE

## 2019-03-20 NOTE — PROGRESS NOTES
Assessment/Plan:             Problem List Items Addressed This Visit        Cardiovascular and Mediastinum    Chronic migraine     Has daily headaches- usually in back of head but more severe ones are associated with  light sensitivity  Pain doesn't go away completely            Other    Palpitation (Chronic)     Less noticeable recently         Anterior neck pain - Primary     Seen by neurology on 3/8 and [facility]- had some temporary relief a day or so- on Effexor start- now on 37 5 and then will increase to 75 mg dose  - mild tiredness  he was given rx for toradol but he reports that did not help in past           Epigastric pain     Had endoscopy- had mild elevated marker for celiac disease  Has cut back on bread etc no real flare of symptoms recently  Cost is a factor in using gluten free diet                 Subjective:      Patient ID: Jackie Riley  is a 24 y o  male    1  Headache and neck pain- has ongoing issues - working 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    2   Celiac panel with positve antibodies noted in nov- saw gi- encouraged to follow a gluten free diet to see if this helps with gi issues as well as fatigue  3  Labs from nov- had low hep b immunity noted- will defer to gi on recommendations as to reimmunization      The following portions of the patient's history were reviewed and updated as appropriate: allergies, current medications and problem list      Review of Systems   Musculoskeletal: Positive for arthralgias  Saw rheumatology in fall- had positve jaye- for followup in summer  All other systems reviewed and are negative          Objective:      Current Outpatient Medications:     multivitamin (THERAGRAN) TABS, Take 1 tablet by mouth daily, Disp: , Rfl:     omeprazole (PriLOSEC) 40 MG capsule, Take 1 capsule (40 mg total) by mouth daily, Disp: 30 capsule, Rfl: 2    venlafaxine (EFFEXOR-XR) 37 5 mg 24 hr capsule, Take 1 capsule (37 5 mg total) by mouth daily 1 cap daily for one week then change to 75mg tabs, Disp: 7 capsule, Rfl: 0    venlafaxine (EFFEXOR-XR) 75 mg 24 hr capsule, Take 1 capsule (75 mg total) by mouth daily, Disp: 30 capsule, Rfl: 2    Blood pressure 106/70, pulse 72, height 5' 6" (1 676 m), weight 61 2 kg (135 lb), SpO2 99 %  Physical Exam   Constitutional: He is oriented to person, place, and time  No distress  No cough   HENT:   Head: Normocephalic  Right Ear: External ear normal    Mouth/Throat: No oropharyngeal exudate  No sinus tednerness   Neck:   Minimal anterior cervical lymophadenopathy   Cardiovascular: Normal rate and regular rhythm  Exam reveals no friction rub  No murmur heard  Pulmonary/Chest: He has no wheezes  End exp wheezes on right   Abdominal: Soft  Bowel sounds are normal  He exhibits no distension  There is no tenderness  Musculoskeletal: He exhibits tenderness  Anterior cervical tenderness left side more t than right  and bilateral  occipital tenderness   Lymphadenopathy:     He has no cervical adenopathy  Neurological: He is alert and oriented to person, place, and time  Skin: No erythema  Nursing note and vitals reviewed

## 2019-03-20 NOTE — ASSESSMENT & PLAN NOTE
Seen by neurology on 3/8 and had trigger injections at [facility]- had some temporary relief a day or so- on Effexor start- now on 37 5 and then will increase to 75 mg dose  - mild tiredness      he was given rx for toradol but he reports that did not help in past

## 2019-03-20 NOTE — ASSESSMENT & PLAN NOTE
Had endoscopy- had mild elevated marker for celiac disease  Has cut back on bread etc no real flare of symptoms recently   Cost is a factor in using gluten free diet

## 2019-03-20 NOTE — ASSESSMENT & PLAN NOTE
Has daily headaches- usually in back of head but more severe ones are associated with  light sensitivity   Pain doesn't go away completely

## 2019-05-10 ENCOUNTER — TRANSCRIBE ORDERS (OUTPATIENT)
Dept: ADMINISTRATIVE | Facility: HOSPITAL | Age: 24
End: 2019-05-10

## 2019-05-10 ENCOUNTER — HOSPITAL ENCOUNTER (OUTPATIENT)
Dept: RADIOLOGY | Facility: HOSPITAL | Age: 24
Discharge: HOME | End: 2019-05-10
Payer: COMMERCIAL

## 2019-05-10 DIAGNOSIS — J98.4 DISEASE OF LUNG: ICD-10-CM

## 2019-05-10 DIAGNOSIS — J98.4 DISEASE OF LUNG: Primary | ICD-10-CM

## 2019-05-10 PROCEDURE — 71046 X-RAY EXAM CHEST 2 VIEWS: CPT

## 2019-06-12 ENCOUNTER — EVALUATION (OUTPATIENT)
Dept: PHYSICAL THERAPY | Facility: CLINIC | Age: 24
End: 2019-06-12
Payer: COMMERCIAL

## 2019-06-12 DIAGNOSIS — M54.2 CHRONIC NECK PAIN: Primary | ICD-10-CM

## 2019-06-12 DIAGNOSIS — R07.9 CHEST PAIN, UNSPECIFIED TYPE: ICD-10-CM

## 2019-06-12 DIAGNOSIS — G89.29 CHRONIC NECK PAIN: Primary | ICD-10-CM

## 2019-06-12 PROCEDURE — 97162 PT EVAL MOD COMPLEX 30 MIN: CPT | Performed by: PHYSICAL THERAPIST

## 2019-06-12 PROCEDURE — 97110 THERAPEUTIC EXERCISES: CPT | Performed by: PHYSICAL THERAPIST

## 2019-06-14 ENCOUNTER — OFFICE VISIT (OUTPATIENT)
Dept: NEUROLOGY | Facility: CLINIC | Age: 24
End: 2019-06-14
Payer: COMMERCIAL

## 2019-06-14 VITALS
BODY MASS INDEX: 21.9 KG/M2 | HEART RATE: 89 BPM | WEIGHT: 136.3 LBS | HEIGHT: 66 IN | DIASTOLIC BLOOD PRESSURE: 70 MMHG | SYSTOLIC BLOOD PRESSURE: 110 MMHG

## 2019-06-14 DIAGNOSIS — R06.02 SHORTNESS OF BREATH: ICD-10-CM

## 2019-06-14 DIAGNOSIS — G24.3 CERVICAL DYSTONIA: ICD-10-CM

## 2019-06-14 DIAGNOSIS — G44.011 INTRACTABLE EPISODIC CLUSTER HEADACHE: Primary | ICD-10-CM

## 2019-06-14 DIAGNOSIS — M54.12 CERVICAL RADICULOPATHY: ICD-10-CM

## 2019-06-14 DIAGNOSIS — G44.86 CERVICOGENIC HEADACHE: ICD-10-CM

## 2019-06-14 PROCEDURE — 96372 THER/PROPH/DIAG INJ SC/IM: CPT | Performed by: PSYCHIATRY & NEUROLOGY

## 2019-06-14 PROCEDURE — 99214 OFFICE O/P EST MOD 30 MIN: CPT | Performed by: PSYCHIATRY & NEUROLOGY

## 2019-06-14 RX ORDER — PREGABALIN 75 MG/1
CAPSULE ORAL
Qty: 60 CAPSULE | Refills: 1 | Status: SHIPPED | OUTPATIENT
Start: 2019-06-14 | End: 2019-09-04

## 2019-06-14 RX ORDER — ZOLMITRIPTAN 5 MG/1
TABLET, FILM COATED ORAL
Qty: 10 TABLET | Refills: 3 | Status: SHIPPED | OUTPATIENT
Start: 2019-06-14 | End: 2019-09-20 | Stop reason: ALTCHOICE

## 2019-06-14 RX ORDER — KETOROLAC TROMETHAMINE 30 MG/ML
60 INJECTION, SOLUTION INTRAMUSCULAR; INTRAVENOUS ONCE
Status: COMPLETED | OUTPATIENT
Start: 2019-06-14 | End: 2019-06-14

## 2019-06-14 RX ADMIN — KETOROLAC TROMETHAMINE 60 MG: 30 INJECTION, SOLUTION INTRAMUSCULAR; INTRAVENOUS at 08:24

## 2019-06-17 ENCOUNTER — TELEPHONE (OUTPATIENT)
Dept: NEUROLOGY | Facility: CLINIC | Age: 24
End: 2019-06-17

## 2019-06-17 ENCOUNTER — TRANSCRIBE ORDERS (OUTPATIENT)
Dept: PHYSICAL THERAPY | Facility: CLINIC | Age: 24
End: 2019-06-17

## 2019-06-17 ENCOUNTER — OFFICE VISIT (OUTPATIENT)
Dept: PHYSICAL THERAPY | Facility: CLINIC | Age: 24
End: 2019-06-17
Payer: COMMERCIAL

## 2019-06-17 DIAGNOSIS — M54.2 NECK PAIN: Primary | ICD-10-CM

## 2019-06-17 DIAGNOSIS — M54.2 CHRONIC NECK PAIN: Primary | ICD-10-CM

## 2019-06-17 DIAGNOSIS — G89.29 CHRONIC NECK PAIN: Primary | ICD-10-CM

## 2019-06-17 DIAGNOSIS — R07.9 CHEST PAIN, UNSPECIFIED TYPE: ICD-10-CM

## 2019-06-17 PROCEDURE — 97112 NEUROMUSCULAR REEDUCATION: CPT | Performed by: PHYSICAL THERAPIST

## 2019-06-17 PROCEDURE — 97140 MANUAL THERAPY 1/> REGIONS: CPT | Performed by: PHYSICAL THERAPIST

## 2019-06-17 PROCEDURE — 97010 HOT OR COLD PACKS THERAPY: CPT | Performed by: PHYSICAL THERAPIST

## 2019-06-18 ENCOUNTER — OFFICE VISIT (OUTPATIENT)
Dept: PHYSICAL THERAPY | Facility: CLINIC | Age: 24
End: 2019-06-18
Payer: COMMERCIAL

## 2019-06-18 DIAGNOSIS — R07.9 CHEST PAIN, UNSPECIFIED TYPE: ICD-10-CM

## 2019-06-18 DIAGNOSIS — M54.2 CHRONIC NECK PAIN: Primary | ICD-10-CM

## 2019-06-18 DIAGNOSIS — G89.29 CHRONIC NECK PAIN: Primary | ICD-10-CM

## 2019-06-18 PROCEDURE — 97140 MANUAL THERAPY 1/> REGIONS: CPT | Performed by: PHYSICAL THERAPIST

## 2019-06-18 PROCEDURE — 97110 THERAPEUTIC EXERCISES: CPT | Performed by: PHYSICAL THERAPIST

## 2019-06-18 PROCEDURE — 97112 NEUROMUSCULAR REEDUCATION: CPT | Performed by: PHYSICAL THERAPIST

## 2019-06-18 PROCEDURE — 97010 HOT OR COLD PACKS THERAPY: CPT | Performed by: PHYSICAL THERAPIST

## 2019-06-20 ENCOUNTER — OFFICE VISIT (OUTPATIENT)
Dept: NEUROLOGY | Facility: CLINIC | Age: 24
End: 2019-06-20
Payer: COMMERCIAL

## 2019-06-20 VITALS
HEIGHT: 66 IN | WEIGHT: 132 LBS | SYSTOLIC BLOOD PRESSURE: 110 MMHG | BODY MASS INDEX: 21.21 KG/M2 | DIASTOLIC BLOOD PRESSURE: 72 MMHG

## 2019-06-20 DIAGNOSIS — G24.3 CERVICAL DYSTONIA: Primary | ICD-10-CM

## 2019-06-20 PROCEDURE — 99214 OFFICE O/P EST MOD 30 MIN: CPT | Performed by: PSYCHIATRY & NEUROLOGY

## 2019-06-20 RX ORDER — DIAZEPAM 2 MG/1
TABLET ORAL
Refills: 0 | COMMUNITY
Start: 2019-06-03 | End: 2019-09-04

## 2019-06-21 ENCOUNTER — TELEPHONE (OUTPATIENT)
Dept: NEUROLOGY | Facility: CLINIC | Age: 24
End: 2019-06-21

## 2019-06-21 LAB
BASOPHILS # BLD AUTO: 0 X10E3/UL (ref 0–0.2)
BASOPHILS NFR BLD AUTO: 1 %
EOSINOPHIL # BLD AUTO: 0 X10E3/UL (ref 0–0.4)
EOSINOPHIL NFR BLD AUTO: 1 %
ERYTHROCYTE [DISTWIDTH] IN BLOOD BY AUTOMATED COUNT: 13.7 % (ref 12.3–15.4)
FERRITIN SERPL-MCNC: 172 NG/ML (ref 30–400)
HCT VFR BLD AUTO: 45.9 % (ref 37.5–51)
HGB BLD-MCNC: 15.6 G/DL (ref 13–17.7)
IMM GRANULOCYTES # BLD: 0 X10E3/UL (ref 0–0.1)
IMM GRANULOCYTES NFR BLD: 0 %
IRON SERPL-MCNC: 109 UG/DL (ref 38–169)
LYMPHOCYTES # BLD AUTO: 1.3 X10E3/UL (ref 0.7–3.1)
LYMPHOCYTES NFR BLD AUTO: 30 %
MCH RBC QN AUTO: 30.6 PG (ref 26.6–33)
MCHC RBC AUTO-ENTMCNC: 34 G/DL (ref 31.5–35.7)
MCV RBC AUTO: 90 FL (ref 79–97)
MONOCYTES # BLD AUTO: 0.3 X10E3/UL (ref 0.1–0.9)
MONOCYTES NFR BLD AUTO: 8 %
NEUTROPHILS # BLD AUTO: 2.6 X10E3/UL (ref 1.4–7)
NEUTROPHILS NFR BLD AUTO: 60 %
PLATELET # BLD AUTO: 179 X10E3/UL (ref 150–450)
RBC # BLD AUTO: 5.09 X10E6/UL (ref 4.14–5.8)
WBC # BLD AUTO: 4.4 X10E3/UL (ref 3.4–10.8)

## 2019-06-24 ENCOUNTER — OFFICE VISIT (OUTPATIENT)
Dept: PHYSICAL THERAPY | Facility: CLINIC | Age: 24
End: 2019-06-24
Payer: COMMERCIAL

## 2019-06-24 DIAGNOSIS — G89.29 CHRONIC NECK PAIN: Primary | ICD-10-CM

## 2019-06-24 DIAGNOSIS — R07.9 CHEST PAIN, UNSPECIFIED TYPE: ICD-10-CM

## 2019-06-24 DIAGNOSIS — M54.2 CHRONIC NECK PAIN: Primary | ICD-10-CM

## 2019-06-24 PROCEDURE — 97140 MANUAL THERAPY 1/> REGIONS: CPT | Performed by: PHYSICAL THERAPIST

## 2019-06-24 PROCEDURE — 97010 HOT OR COLD PACKS THERAPY: CPT | Performed by: PHYSICAL THERAPIST

## 2019-06-24 PROCEDURE — 97112 NEUROMUSCULAR REEDUCATION: CPT | Performed by: PHYSICAL THERAPIST

## 2019-06-24 PROCEDURE — 97110 THERAPEUTIC EXERCISES: CPT | Performed by: PHYSICAL THERAPIST

## 2019-06-25 ENCOUNTER — OFFICE VISIT (OUTPATIENT)
Dept: PHYSICAL THERAPY | Facility: CLINIC | Age: 24
End: 2019-06-25
Payer: COMMERCIAL

## 2019-06-25 DIAGNOSIS — G89.29 CHRONIC NECK PAIN: Primary | ICD-10-CM

## 2019-06-25 DIAGNOSIS — R07.9 CHEST PAIN, UNSPECIFIED TYPE: ICD-10-CM

## 2019-06-25 DIAGNOSIS — M54.2 CHRONIC NECK PAIN: Primary | ICD-10-CM

## 2019-06-25 PROCEDURE — 97110 THERAPEUTIC EXERCISES: CPT | Performed by: PHYSICAL THERAPIST

## 2019-06-25 PROCEDURE — 97112 NEUROMUSCULAR REEDUCATION: CPT | Performed by: PHYSICAL THERAPIST

## 2019-06-25 PROCEDURE — 97010 HOT OR COLD PACKS THERAPY: CPT | Performed by: PHYSICAL THERAPIST

## 2019-06-25 PROCEDURE — 97140 MANUAL THERAPY 1/> REGIONS: CPT | Performed by: PHYSICAL THERAPIST

## 2019-07-01 ENCOUNTER — OFFICE VISIT (OUTPATIENT)
Dept: PHYSICAL THERAPY | Facility: CLINIC | Age: 24
End: 2019-07-01
Payer: COMMERCIAL

## 2019-07-01 DIAGNOSIS — M54.2 CHRONIC NECK PAIN: Primary | ICD-10-CM

## 2019-07-01 DIAGNOSIS — R07.9 CHEST PAIN, UNSPECIFIED TYPE: ICD-10-CM

## 2019-07-01 DIAGNOSIS — G89.29 CHRONIC NECK PAIN: Primary | ICD-10-CM

## 2019-07-01 PROCEDURE — 97140 MANUAL THERAPY 1/> REGIONS: CPT | Performed by: PHYSICAL THERAPIST

## 2019-07-01 PROCEDURE — 97112 NEUROMUSCULAR REEDUCATION: CPT | Performed by: PHYSICAL THERAPIST

## 2019-07-01 PROCEDURE — 97010 HOT OR COLD PACKS THERAPY: CPT | Performed by: PHYSICAL THERAPIST

## 2019-07-01 NOTE — PROGRESS NOTES
Daily Note     Today's date: 2019  Patient name: Trish Charles  : 1995  MRN: [id number]  Referring provider: Yazmin Sims DO  Dx:   Encounter Diagnosis     ICD-10-CM    1  Chronic neck pain M54 2     G89 29    2  Chest pain, unspecified type R07 9                   Subjective: Pt reports that last week he felt okay and then over the weekend he had increased sxs due to not doing a lot  Objective: See treatment diary below      Assessment: Tolerated treatment well; he requires vcs on proper sequencing with exercises  Discussed proper responses to exercises and DOMS  Encouraged pt to use heat at home  Pt showed his lifting in a day which is definitely a contributing factor to having increased neck pain due to his 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  Patient demonstrated fatigue post treatment and would benefit from continued PT      Plan: Continue per plan of care  Progress when appropriate  Precautions: standard     Specialty Daily Treatment Diary       Manual    TPR/STM cervical        PA c/s jt mobs SMF (sitting)    SMF (sitting)   Rib jt mobs        Neck stretching SMF    SMF   IASTM SMF    SMF   Exercise Diary         UBE 6 min retro    6 min retro   Upper trap stretch 10 sec x 3    10 sec x 3   Levator scap stretch 10 sec x 3    10 sec x 3   SCM stretch 10 sec x 3    10 sec x 3   Chin tucks (supine) Sitting: 3x10; Supine: 2x10    Sitting: 3x10; Supine: 10x   Isometrics c/s                Scap Retraction Blue 30x    Blue 30x   B S' Ext Blk 30x    Blk 30x   Scaption        B ER OTB 3x10    NV   B Horiz Abd OTB 3x10    NV   Pec stretch 20 sec x 3    20 sec x 3   Wall Pushup 2x10       Wall ball circles 4# 30 cw, ccw    4# 30 cw/ccw   SNAGs  Ext: 30; Rot: 30    Ext (10x);  Rot: 20x c towel   Prone Y        Prone T        Prone I        Foam Roller NV               Modalities        MH 10 pre    10 pre             Skin checks performed pre and post application and intact

## 2019-07-02 ENCOUNTER — OFFICE VISIT (OUTPATIENT)
Dept: PHYSICAL THERAPY | Facility: CLINIC | Age: 24
End: 2019-07-02
Payer: COMMERCIAL

## 2019-07-02 DIAGNOSIS — M54.2 CHRONIC NECK PAIN: Primary | ICD-10-CM

## 2019-07-02 DIAGNOSIS — R07.9 CHEST PAIN, UNSPECIFIED TYPE: ICD-10-CM

## 2019-07-02 DIAGNOSIS — G89.29 CHRONIC NECK PAIN: Primary | ICD-10-CM

## 2019-07-02 PROCEDURE — 97010 HOT OR COLD PACKS THERAPY: CPT | Performed by: PHYSICAL THERAPIST

## 2019-07-02 PROCEDURE — 97110 THERAPEUTIC EXERCISES: CPT | Performed by: PHYSICAL THERAPIST

## 2019-07-02 PROCEDURE — 97140 MANUAL THERAPY 1/> REGIONS: CPT | Performed by: PHYSICAL THERAPIST

## 2019-07-02 NOTE — PROGRESS NOTES
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 was helping dropped the bag on his right shoulder  He had significant pain after the work incident with head pain as well  Objective: See treatment diary below      Assessment: Tolerated treatment well; limited strengthening program today to focus more on stretching  Added foam roller for thoracic jt mobility  Pt demonstrates improving cervical AROM however he had pain at end range  Patient demonstrated fatigue post treatment and would benefit from continued PT         Plan: Continue per plan of care  Progress when appropriate  Precautions: standard     Specialty Daily Treatment Diary       Manual    TPR/STM cervical        PA c/s jt mobs SMF (sitting) SMF   SMF (sitting)   Rib jt mobs  SMF      Neck stretching SMF SMF   SMF   IASTM SMF    SMF   Exercise Diary         UBE 6 min retro 6 min retro   6 min retro   Upper trap stretch 10 sec x 3 10 sec x 3   10 sec x 3   Levator scap stretch 10 sec x 3 10 sec x 3   10 sec x 3   SCM stretch 10 sec x 3 10 sec x 3   10 sec x 3   Chin tucks (supine) Sitting: 3x10; Supine: 2x10 Sitting: 3x10  Supine: 2x10   Sitting: 3x10; Supine: 10x   Isometrics c/s                Scap Retraction Blue 30x Blue 30x   Blue 30x   B S' Ext Blk 30x Blk 30x   Blk 30x   Scaption        B ER OTB 3x10 NV   NV   B Horiz Abd OTB 3x10 NV   NV   Pec stretch 20 sec x 3 20 sec x 3   20 sec x 3   Wall Pushup 2x10 NV      Wall ball circles 4# 30 cw, ccw 4# 30 cw, ccw   4# 30 cw/ccw   SNAGs  Ext: 30; Rot: 30 Ext: 30; Rot: 30; Lat flex: 20   Ext (10x);  Rot: 20x c towel   Prone Y        Prone T        Prone I        Foam Roller NV 2 mins              Modalities         10 pre 10 pre    10 pre             Skin checks performed pre and post application and intact

## 2019-07-03 ENCOUNTER — TELEPHONE (OUTPATIENT)
Dept: NEUROLOGY | Facility: CLINIC | Age: 24
End: 2019-07-03

## 2019-07-03 NOTE — TELEPHONE ENCOUNTER
Please see denial for portable oxygen unit in media  [facility] is requesting a detailed letter of medical necessity explaining how often the patient's cluster headaches occur and how long they last  Also requesting other treatments the patient has tried for this  The ordering provider can also call [phone] within 2 days to discuss with physician reviewer  Per 6/14 office note, headaches occur a couple of times a week and last 1-2 hours  He has tried and failed various medications including tizanidine, nortriptyline, gabapentin- initially helped but wore off- no a/e, indocin as well, right onb with some benefit transient, duloxetine cymbalta, toradol, baclofen, trigger points with minimal to no benefit, robaxin with some benefit but significant sedation thus takes it once nightly  Tried Aimovig one injection and states did not receive another one  States the one injection was not helpful  Prednisone with no benefit, effexor not helpful  Spoke with Hanny Freitas with [facility]  We can fax letter to [phone] attn: DME department  Please advise

## 2019-07-08 ENCOUNTER — OFFICE VISIT (OUTPATIENT)
Dept: PHYSICAL THERAPY | Facility: CLINIC | Age: 24
End: 2019-07-08
Payer: COMMERCIAL

## 2019-07-08 DIAGNOSIS — R07.9 CHEST PAIN, UNSPECIFIED TYPE: ICD-10-CM

## 2019-07-08 DIAGNOSIS — M54.2 CHRONIC NECK PAIN: Primary | ICD-10-CM

## 2019-07-08 DIAGNOSIS — G89.29 CHRONIC NECK PAIN: Primary | ICD-10-CM

## 2019-07-08 PROCEDURE — 97140 MANUAL THERAPY 1/> REGIONS: CPT | Performed by: PHYSICAL THERAPIST

## 2019-07-08 PROCEDURE — 97112 NEUROMUSCULAR REEDUCATION: CPT | Performed by: PHYSICAL THERAPIST

## 2019-07-08 PROCEDURE — 97110 THERAPEUTIC EXERCISES: CPT | Performed by: PHYSICAL THERAPIST

## 2019-07-08 PROCEDURE — 97010 HOT OR COLD PACKS THERAPY: CPT | Performed by: PHYSICAL THERAPIST

## 2019-07-08 NOTE — PROGRESS NOTES
Daily Note     Today's date: 2019  Patient name: La Gage  : 1995  MRN: [id number]  Referring provider: Troy Dias DO  Dx:   Encounter Diagnosis     ICD-10-CM    1  Chronic neck pain M54 2     G89 29    2  Chest pain, unspecified type R07 9                   Subjective: Pt reports that he was doing yardwork this weekend and had a lot of pain during it as well as afterwards  He reports that he had to take frequent rest breaks  Objective: See treatment diary below      Assessment: Tolerated treatment well; he reports having some soreness in his neck and right above clavicle  He performed L sided rib jt mob today using SOS with R lateral flexion; he denies having pain during exercise  Encouraged pt to try to limit activities that increase his sxs especially heavier lifting at work  Patient demonstrated fatigue post treatment and would benefit from continued PT      Plan: Continue per plan of care        Precautions: standard     Specialty Daily Treatment Diary       Manual      TPR/STM cervical        PA c/s jt mobs SMF (sitting) SMF SMF     Rib jt mobs; clavicle inferior jt mob  SMF SMF     Neck stretching SMF SMF SMF     IASTM SMF  SMF     Exercise Diary         UBE 6 min retro 6 min retro 6 min retro     Upper trap stretch 10 sec x 3 10 sec x 3 10 sec x 3     Levator scap stretch 10 sec x 3 10 sec x 3 10 sec x 3     SCM stretch 10 sec x 3 10 sec x 3 10 sec x 3     Chin tucks (supine) Sitting: 3x10; Supine: 2x10 Sitting: 3x10  Supine: 2x10 Siting: 3x10     Isometrics c/s        L Rib jt mob   SOS 2x10     Scap Retraction Blue 30x Blue 30x Blk 30x     B S' Ext Blk 30x Blk 30x Blue 30x     Scaption        B ER OTB 3x10 NV OTB 3x10     B Horiz Abd OTB 3x10 NV OTB 3x10     Pec stretch 20 sec x 3 20 sec x 3 20 sec x 3     Wall Pushup 2x10 NV 3x10     Wall ball circles 4# 30 cw, ccw 4# 30 cw, ccw 4# 3x10 cw, ccw     SNAGs  Ext: 30; Rot: 30 Ext: 30; Rot: 30; Lat flex: 20 Ext: 30; Rot: 30; Lat flex: 30     Prone Y        Prone T        Prone I   NV     Foam Roller NV 2 mins 2 mins             Modalities        MH 10 pre 10 pre  10 pre               Skin checks performed pre and post application and intact

## 2019-07-09 ENCOUNTER — OFFICE VISIT (OUTPATIENT)
Dept: PHYSICAL THERAPY | Facility: CLINIC | Age: 24
End: 2019-07-09
Payer: COMMERCIAL

## 2019-07-09 DIAGNOSIS — R07.9 CHEST PAIN, UNSPECIFIED TYPE: ICD-10-CM

## 2019-07-09 DIAGNOSIS — M54.2 CHRONIC NECK PAIN: Primary | ICD-10-CM

## 2019-07-09 DIAGNOSIS — G89.29 CHRONIC NECK PAIN: Primary | ICD-10-CM

## 2019-07-09 PROCEDURE — 97010 HOT OR COLD PACKS THERAPY: CPT | Performed by: PHYSICAL THERAPIST

## 2019-07-09 PROCEDURE — 97140 MANUAL THERAPY 1/> REGIONS: CPT | Performed by: PHYSICAL THERAPIST

## 2019-07-09 PROCEDURE — 97110 THERAPEUTIC EXERCISES: CPT | Performed by: PHYSICAL THERAPIST

## 2019-07-09 PROCEDURE — 97112 NEUROMUSCULAR REEDUCATION: CPT | Performed by: PHYSICAL THERAPIST

## 2019-07-09 NOTE — PROGRESS NOTES
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  Objective: See treatment diary below      Assessment: Tolerated treatment well; instructed pt on perform neck/face strengthening exercise at home  Instructed pt to avoid heavy lifting at work and repetitive lifting  He demonstrates decreased shoulder ROM and thus encouraged pt to use pulleys to work on shoulder ROM  Patient demonstrated fatigue post treatment and would benefit from continued PT      Plan: Continue per plan of care        Precautions: standard     Specialty Daily Treatment Diary       Manual     TPR/STM cervical        PA c/s jt mobs SMF (sitting) SMF SMF SMF    Rib jt mobs; clavicle inferior jt mob  SMF SMF SMF    Neck stretching SMF SMF SMF SMF    IASTM SMF  SMF SMF    Exercise Diary         UBE 6 min retro 6 min retro 6 min retro 6 min retro     Upper trap stretch 10 sec x 3 10 sec x 3 10 sec x 3 20 sec x 3    Levator scap stretch 10 sec x 3 10 sec x 3 10 sec x 3 20 sec x 3    SCM stretch 10 sec x 3 10 sec x 3 10 sec x 3 20 sec x 3    Chin tucks (supine) Sitting: 3x10; Supine: 2x10 Sitting: 3x10  Supine: 2x10 Siting: 3x10 Sittinx    Isometrics c/s        L Rib jt mob   SOS 2x10 SOS 20x    Scap Retraction Blue 30x Blue 30x Blk 30x Light Blue 3x10    B S' Ext Blk 30x Blk 30x Blue 30x Light Blue 3x10    Scaption        B ER OTB 3x10 NV OTB 3x10 GTB 3x10    B Horiz Abd OTB 3x10 NV OTB 3x10 GTB 3x10    Pec stretch 20 sec x 3 20 sec x 3 20 sec x 3 20 sec x 3    Wall Pushup 2x10 NV 3x10 3x10    Wall ball circles 4# 30 cw, ccw 4# 30 cw, ccw 4# 3x10 cw, ccw 4# 3x10 cw, ccw    SNAGs  Ext: 30; Rot: 30 Ext: 30; Rot: 30; Lat flex: 20 Ext: 30; Rot: 30; Lat flex: 30 Ext: 30; Rot: 30; Lat flex: 30    Prone Y        Prone T        Prone I   NV 2x10 c chin tucks    Foam Roller NV 2 mins 2 mins 2 min            Modalities        MH 10 pre 10 pre  10 pre 10 post              Skin checks performed pre and post application and intact

## 2019-07-15 ENCOUNTER — OFFICE VISIT (OUTPATIENT)
Dept: PHYSICAL THERAPY | Facility: CLINIC | Age: 24
End: 2019-07-15
Payer: COMMERCIAL

## 2019-07-15 DIAGNOSIS — R07.9 CHEST PAIN, UNSPECIFIED TYPE: ICD-10-CM

## 2019-07-15 DIAGNOSIS — M54.2 CHRONIC NECK PAIN: Primary | ICD-10-CM

## 2019-07-15 DIAGNOSIS — G89.29 CHRONIC NECK PAIN: Primary | ICD-10-CM

## 2019-07-15 PROCEDURE — 97110 THERAPEUTIC EXERCISES: CPT | Performed by: PHYSICAL THERAPIST

## 2019-07-15 PROCEDURE — 97112 NEUROMUSCULAR REEDUCATION: CPT | Performed by: PHYSICAL THERAPIST

## 2019-07-15 PROCEDURE — 97140 MANUAL THERAPY 1/> REGIONS: CPT | Performed by: PHYSICAL THERAPIST

## 2019-07-15 PROCEDURE — 97010 HOT OR COLD PACKS THERAPY: CPT | Performed by: PHYSICAL THERAPIST

## 2019-07-15 NOTE — PROGRESS NOTES
Daily Note     Today's date: 7/15/2019  Patient name: Rey Carl  : 1995  MRN: [id number]  Referring provider: Aniyah Eduardo DO  Dx:   Encounter Diagnosis     ICD-10-CM    1  Chronic neck pain M54 2     G89 29    2  Chest pain, unspecified type R07 9                   Subjective: Pt reports that he was having discomfort on his R neck which he reports that he was having difficulty swallowing secondary unknown etiology  Objective: See treatment diary below      Assessment: Tolerated treatment well; he requires vcs on proper sequencing with exercises  He demonstrates slow improvements with CROM however he continues to report having pain  Therapist discussed proper responses to exercise and encouraged him to perform HEP throughout the day with less reps if he becomes too sore or have pain  Patient demonstrated fatigue post treatment and would benefit from continued PT      Plan: Continue per plan of care  Progress when appropriate  Assess response to ice        Precautions: standard     Specialty Daily Treatment Diary       Manual 7/1 7/2 7/8 7/9 7/15   TPR/STM cervical        PA c/s jt mobs SMF (sitting) SMF SMF SMF SMF   Rib jt mobs; clavicle inferior jt mob  SMF SMF SMF SMF   Neck stretching SMF SMF SMF SMF SMF   IASTM SMF  SMF SMF SMF   Exercise Diary         UBE 6 min retro 6 min retro 6 min retro 6 min retro  6 min retro   Upper trap stretch 10 sec x 3 10 sec x 3 10 sec x 3 20 sec x 3 20 sec x 3   Levator scap stretch 10 sec x 3 10 sec x 3 10 sec x 3 20 sec x 3 20 sec x 3   SCM stretch 10 sec x 3 10 sec x 3 10 sec x 3 20 sec x 3 20 sec x 3   Chin tucks (supine) Sitting: 3x10; Supine: 2x10 Sitting: 3x10  Supine: 2x10 Siting: 3x10 Sittinx Sittinx   Isometrics c/s        L Rib jt mob   SOS 2x10 SOS 20x SOS 20x   Scap Retraction Blue 30x Blue 30x Blk 30x Light Blue 3x10 Light Blue: 30   B S' Ext Blk 30x Blk 30x Blue 30x Light Blue 3x10 Light Blue: 30x   Scaption        B ER OTB 3x10 NV OTB 3x10 GTB 3x10 GTB 3x10   B Horiz Abd OTB 3x10 NV OTB 3x10 GTB 3x10 GTB 3x10    Pec stretch 20 sec x 3 20 sec x 3 20 sec x 3 20 sec x 3 30 sec x 3   Wall Pushup 2x10 NV 3x10 3x10 3x10   Wall ball circles 4# 30 cw, ccw 4# 30 cw, ccw 4# 3x10 cw, ccw 4# 3x10 cw, ccw 4# 3x15 cw, ccw   SNAGs  Ext: 30; Rot: 30 Ext: 30; Rot: 30; Lat flex: 20 Ext: 30; Rot: 30; Lat flex: 30 Ext: 30; Rot: 30; Lat flex: 30 Ext: 30; Rot/Lat Flex 30x   Prone Y        Prone T     0# 2x10 c chin tucks   Prone I   NV 2x10 c chin tucks 2# 3x10 c chin tuck   Foam Roller NV 2 mins 2 mins 2 min 2 min            Modalities        MH 10 pre 10 pre  10 pre 10 post    CP     10 min post     Skin checks performed pre and post application and intact

## 2019-07-16 ENCOUNTER — PROCEDURE VISIT (OUTPATIENT)
Dept: NEUROLOGY | Facility: CLINIC | Age: 24
End: 2019-07-16
Payer: COMMERCIAL

## 2019-07-16 VITALS — DIASTOLIC BLOOD PRESSURE: 64 MMHG | TEMPERATURE: 97.4 F | SYSTOLIC BLOOD PRESSURE: 110 MMHG

## 2019-07-16 DIAGNOSIS — G24.3 CERVICAL DYSTONIA: Primary | ICD-10-CM

## 2019-07-16 PROCEDURE — 64611 CHEMODENERV SALIV GLANDS: CPT | Performed by: PSYCHIATRY & NEUROLOGY

## 2019-07-16 NOTE — PROGRESS NOTES
Berna Sun  returned today to the Botox clinic in the [address] at the [facility]  As you know, the patient is a 24 y o  male who is receiving Botox injections for cervical dystonia  Pt reports that his symptoms are largely unchanged and consist primarily of pain  He recently had some similar      Medications:      Current Outpatient Medications on File Prior to Visit   Medication Sig Dispense Refill    diazepam (VALIUM) 2 mg tablet TAKE 1 TABLET 3 TIMES A DAY AS NEEDED FOR SKELETAL MUSCLE SPASMS  0    multivitamin (THERAGRAN) TABS Take 1 tablet by mouth daily      omeprazole (PriLOSEC) 40 MG capsule Take 1 capsule (40 mg total) by mouth daily (Patient not taking: Reported on 6/14/2019) 30 capsule 2    pregabalin (LYRICA) 75 mg capsule Take 1 tab nightly x 1 wk, then 1 tab BID- increase as tolerated (Patient not taking: Reported on 6/20/2019) 60 capsule 1    Probiotic Product (PROBIOTIC-10 PO) Take by mouth      venlafaxine (EFFEXOR-XR) 37 5 mg 24 hr capsule Take 1 capsule (37 5 mg total) by mouth daily 1 cap daily for one week then change to 75mg tabs (Patient not taking: Reported on 6/14/2019) 7 capsule 0    venlafaxine (EFFEXOR-XR) 75 mg 24 hr capsule Take 1 capsule (75 mg total) by mouth daily (Patient not taking: Reported on 6/14/2019) 30 capsule 2    ZOLMitriptan (ZOMIG) 5 MG tablet Take 1/2-1 tab at onset of migraine, can repeat in 2 hours, Max 2 tabs in 24 hours  max 10mg/24 hours (Patient not taking: Reported on 6/20/2019) 10 tablet 3     No current facility-administered medications on file prior to visit  Focused Neurologic examination:     Slight hypertrophy in the left SCM and left shoulder trapezius  Injection details:     After sterile preparation of the skin, a total of 250 units of Dysport were injected without EMG guidance  Each 300 units was diluted in 0 6 cc of PFNS  50 units were discarded as waste        The injections were done as follows into the:  LEFT Sternocleidomastoid: 25 units (0 05) x2  LEFT Splenious capitis: 50 units (0 1ml)  LEFT Trapezius, shoulder portion: 50 units (0 1ml) x2      The procedure was well tolerated without immediate complications  The patient will return in 12 weeks for further evaluation and treatment  Next time we may consider:   Increased doses of SCM and shoulder trap

## 2019-07-17 ENCOUNTER — OFFICE VISIT (OUTPATIENT)
Dept: PHYSICAL THERAPY | Facility: CLINIC | Age: 24
End: 2019-07-17
Payer: COMMERCIAL

## 2019-07-17 DIAGNOSIS — M54.2 CHRONIC NECK PAIN: Primary | ICD-10-CM

## 2019-07-17 DIAGNOSIS — R07.9 CHEST PAIN, UNSPECIFIED TYPE: ICD-10-CM

## 2019-07-17 DIAGNOSIS — G89.29 CHRONIC NECK PAIN: Primary | ICD-10-CM

## 2019-07-17 PROCEDURE — 97112 NEUROMUSCULAR REEDUCATION: CPT | Performed by: PHYSICAL THERAPIST

## 2019-07-17 PROCEDURE — 97010 HOT OR COLD PACKS THERAPY: CPT | Performed by: PHYSICAL THERAPIST

## 2019-07-17 PROCEDURE — 97140 MANUAL THERAPY 1/> REGIONS: CPT | Performed by: PHYSICAL THERAPIST

## 2019-07-17 NOTE — PROGRESS NOTES
Daily Note     Today's date: 2019  Patient name: Brianna White  : 1995  MRN: [id number]  Referring provider: Mireya Frances DO  Dx:   Encounter Diagnosis     ICD-10-CM    1  Chronic neck pain M54 2     G89 29    2  Chest pain, unspecified type R07 9        Start Time: 1045  Stop Time: 1200  Total time in clinic (min): 75 minutes    Subjective:  Pt reports that he saw neurology yesterday and they have him some injections in the left side of his neck  He is unsure if it has helped at the moment  Objective: See treatment diary below      Assessment: Tolerated treatment well; he does not report having increasing sxs throughout session  He reports having a slight decrease in left sided neck pain post gentle IASTM and STM  Patient demonstrated fatigue post treatment and would benefit from continued PT      Plan: Continue per plan of care        Precautions: standard     Specialty Daily Treatment Diary       Manual 7/17    7/15   TPR/STM cervical SMF       PA c/s jt mobs SMF    SMF   Rib jt mobs; clavicle inferior jt mob SMF    SMF   Neck stretching SMF    SMF   IASTM SMF    SMF   Exercise Diary         UBE 6 min retro    6 min retro   Upper trap stretch 20 sec x 3    20 sec x 3   Levator scap stretch 20 sec x 3    20 sec x 3   SCM stretch 20 sec x 3    20 sec x 3   Chin tucks (supine) Sitinx    Sittinx   Isometrics c/s        L Rib jt mob SOS 20x    SOS 20x   Scap Retraction Light Blue: 30    Light Blue: 30   B S' Ext Light Blue: 30x    Light Blue: 30x   Scaption        B ER GTB 3x10    GTB 3x10   B Horiz Abd GTB 3x10    GTB 3x10    Pec stretch 20 sec x 3    30 sec x 3   Wall Pushup 25x    3x10   Wall ball circles 4# 3x15 cw, ccw    4# 3x15 cw, ccw   SNAGs  Ext: 30; Rot: 30; Lat flex: 30    Ext: 30; Rot/Lat Flex 30x   Prone Y        Prone T 0# 3x10 c chin tuck     0# 2x10 c chin tucks   Prone I 2# 3x10 c chin tuck    2# 3x10 c chin tuck   Foam Roller 2 min    2 min            Modalities JENNIFER        CP 10 min post    10 min post     Skin checks performed pre and post application and intact

## 2019-07-22 ENCOUNTER — OFFICE VISIT (OUTPATIENT)
Dept: PHYSICAL THERAPY | Facility: CLINIC | Age: 24
End: 2019-07-22
Payer: COMMERCIAL

## 2019-07-22 DIAGNOSIS — R07.9 CHEST PAIN, UNSPECIFIED TYPE: ICD-10-CM

## 2019-07-22 DIAGNOSIS — M54.2 CHRONIC NECK PAIN: Primary | ICD-10-CM

## 2019-07-22 DIAGNOSIS — G89.29 CHRONIC NECK PAIN: Primary | ICD-10-CM

## 2019-07-22 PROCEDURE — 97112 NEUROMUSCULAR REEDUCATION: CPT | Performed by: PHYSICAL THERAPIST

## 2019-07-22 PROCEDURE — 97110 THERAPEUTIC EXERCISES: CPT | Performed by: PHYSICAL THERAPIST

## 2019-07-22 PROCEDURE — 97140 MANUAL THERAPY 1/> REGIONS: CPT | Performed by: PHYSICAL THERAPIST

## 2019-07-22 NOTE — PROGRESS NOTES
Daily Note     Today's date: 2019  Patient name: Letty Aparicio  : 1995  MRN: [id number]  Referring provider: Carlos Rome DO  Dx:   Encounter Diagnosis     ICD-10-CM    1  Chronic neck pain M54 2     G89 29    2  Chest pain, unspecified type R07 9                   Subjective: Pt reports that his neck pain was not too bad over the weekend but had a lot of trouble with breathing  Objective: See treatment diary below      Assessment: Tolerated treatment well; therapist dicussed seeing MD due to his continued difficulties with breathing with possible need for an inhaler  He denies having increasing sxs during session with increase in resistance  Declined modalities post session and thus encouraged pt to use modalities at home  Patient demonstrated fatigue post treatment and would benefit from continued PT      Plan: Continue per plan of care        Precautions: standard     Specialty Daily Treatment Diary       Manual 7/17 7/22   7/15   TPR/STM cervical SMF SMF      PA c/s jt mobs SMF SMF   SMF   Rib jt mobs; clavicle inferior jt mob SMF SMF   SMF   Neck stretching SMF SMF   SMF   IASTM SMF SMF   SMF   Exercise Diary         UBE 6 min retro 6 min retro   6 min retro   Upper trap stretch 20 sec x 3 20 sec x 3    20 sec x 3   Levator scap stretch 20 sec x 3 20 sec x 3    20 sec x 3   SCM stretch 20 sec x 3 20 sec x 3    20 sec x 3   Chin tucks (supine) Sitinx Supine: 30x   Sittinx   Isometrics c/s        L Rib jt mob SOS 20x 30x c SOS   SOS 20x   Scap Retraction Light Blue: 30 Light Blue: 30   Light Blue: 30   B S' Ext Light Blue: 30x Light Blue: 30x   Light Blue: 30x   Scaption        B ER GTB 3x10 BTB 3x10   GTB 3x10   B Horiz Abd GTB 3x10 BTB 3x10   GTB 3x10    Pec stretch 20 sec x 3 20 sec x 3   30 sec x 3   Wall Pushup 25x 30   3x10   Wall ball circles 4# 3x15 cw, ccw 4# 3x15 cw, ccw   4# 3x15 cw, ccw   SNAGs  Ext: 30; Rot: 30; Lat flex: 30 Ext/Rot/Lat flex: 30x    Ext: 30; Rot/Lat Flex 30x   Prone Y  0# 3x10 c chin tuck       Prone T 0# 3x10 c chin tuck  1# 3x10 c chin tuck    0# 2x10 c chin tucks   Prone I 2# 3x10 c chin tuck 2# 3x10 c chin tuck   2# 3x10 c chin tuck   Foam Roller 2 min 2 min    2 min            Modalities                CP 10 min post Dec   10 min post     Skin checks performed pre and post application and intact

## 2019-07-23 ENCOUNTER — OFFICE VISIT (OUTPATIENT)
Dept: PHYSICAL THERAPY | Facility: CLINIC | Age: 24
End: 2019-07-23
Payer: COMMERCIAL

## 2019-07-23 DIAGNOSIS — R07.9 CHEST PAIN, UNSPECIFIED TYPE: ICD-10-CM

## 2019-07-23 DIAGNOSIS — M54.2 CHRONIC NECK PAIN: Primary | ICD-10-CM

## 2019-07-23 DIAGNOSIS — G89.29 CHRONIC NECK PAIN: Primary | ICD-10-CM

## 2019-07-23 PROCEDURE — 97112 NEUROMUSCULAR REEDUCATION: CPT | Performed by: PHYSICAL THERAPIST

## 2019-07-23 PROCEDURE — 97110 THERAPEUTIC EXERCISES: CPT | Performed by: PHYSICAL THERAPIST

## 2019-07-23 PROCEDURE — 97010 HOT OR COLD PACKS THERAPY: CPT | Performed by: PHYSICAL THERAPIST

## 2019-07-23 PROCEDURE — 97140 MANUAL THERAPY 1/> REGIONS: CPT | Performed by: PHYSICAL THERAPIST

## 2019-07-23 NOTE — PROGRESS NOTES
Daily Note     Today's date: 2019  Patient name: Maria Luisa Garcia  : 1995  MRN: [id number]  Referring provider: Ranulfo Hawk DO  Dx:   Encounter Diagnosis     ICD-10-CM    1  Chronic neck pain M54 2     G89 29    2  Chest pain, unspecified type R07 9                   Subjective: Pt reports being sore on the L side of his neck today secondary to unknown etiology but otherwise no new complaints  Objective: See treatment diary below      Assessment: Tolerated treatment well; he reports having some soreness throughout program  Discussed over use of accessory muscles when he is SOB and encouraged pt to contact MD for possible inhaler or breathing treatments  He reports that he has R side of head pain today which is increased with smooth pursuit exercises  Therapist thoroughly discussed proper responses due to probable post concussive syndrome and monitoring sxs at home  Patient demonstrated fatigue post treatment and would benefit from continued PT      Plan: Continue per plan of care   Assess responses to smooth pursuit exercises recently added     Precautions: standard     HEP: convergence, Smooth pursuits all directions    Specialty Daily Treatment Diary       Manual      TPR/STM cervical SMF SMF SMF     PA c/s jt mobs SMF SMF SMF     Rib jt mobs; clavicle inferior jt mob SMF SMF SMF     Neck stretching SMF SMF SMF     IASTM SMF SMF SMF     Exercise Diary         UBE 6 min retro 6 min retro 6 min retro     Upper trap stretch 20 sec x 3 20 sec x 3  20 sec x 3     Levator scap stretch 20 sec x 3 20 sec x 3  20 sec x 3     SCM stretch 20 sec x 3 20 sec x 3  20 sec x 3     Chin tucks (supine) Sitinx Supine: 30x Supine: 15  Sittinx     Isometrics c/s        L Rib jt mob SOS 20x 30x c SOS 30x c SOS     Scap Retraction Light Blue: 30 Light Blue: 30 Light Blue: 30x     B S' Ext Light Blue: 30x Light Blue: 30x Light Blue: 30x     Scaption        B ER GTB 3x10 BTB 3x10 BTB 3x10 B Horiz Abd GTB 3x10 BTB 3x10 BTB 3x10     Pec stretch 20 sec x 3 20 sec x 3 20 sec x 3     Wall Pushup 25x 30 30     Wall ball circles 4# 3x15 cw, ccw 4# 3x15 cw, ccw 4# 3x15 cw, ccw     SNAGs  Ext: 30; Rot: 30; Lat flex: 30 Ext/Rot/Lat flex: 30x  Ext/Rot/Lat flex 30x     Prone Y  0# 3x10 c chin tuck  0# 3x10 c chin tuck     Prone T 0# 3x10 c chin tuck  1# 3x10 c chin tuck  1#,(R) 0# (L) 3x10 c chin tuck     Prone I 2# 3x10 c chin tuck 2# 3x10 c chin tuck 2# 3x10 c chin tuck     Foam Roller 2 min 2 min  2 min      Smooth Pursuit   3 min all directions and convergence     Modalities        MH   10 min c stretching     CP 10 min post Dec 5 mins       Skin checks performed pre and post application and intact

## 2019-07-29 ENCOUNTER — OFFICE VISIT (OUTPATIENT)
Dept: PHYSICAL THERAPY | Facility: CLINIC | Age: 24
End: 2019-07-29
Payer: COMMERCIAL

## 2019-07-29 DIAGNOSIS — G89.29 CHRONIC NECK PAIN: Primary | ICD-10-CM

## 2019-07-29 DIAGNOSIS — R07.9 CHEST PAIN, UNSPECIFIED TYPE: ICD-10-CM

## 2019-07-29 DIAGNOSIS — M54.2 CHRONIC NECK PAIN: Primary | ICD-10-CM

## 2019-07-29 PROCEDURE — 97010 HOT OR COLD PACKS THERAPY: CPT | Performed by: PHYSICAL THERAPIST

## 2019-07-29 PROCEDURE — 97110 THERAPEUTIC EXERCISES: CPT | Performed by: PHYSICAL THERAPIST

## 2019-07-29 PROCEDURE — 97112 NEUROMUSCULAR REEDUCATION: CPT | Performed by: PHYSICAL THERAPIST

## 2019-07-29 NOTE — PROGRESS NOTES
Daily Note     Today's date: 2019  Patient name: Cyrus Saucedo  : 1995  MRN: [id number]  Referring provider: Ara Dean DO  Dx:   Encounter Diagnosis     ICD-10-CM    1  Chronic neck pain M54 2     G89 29    2  Chest pain, unspecified type R07 9                   Subjective: Pt reports that the front of his neck continues to have pain  He plans on calling his MD today  Objective: See treatment diary below      Assessment: Tolerated treatment well; he requires vcs on proper sequencing with exercises and denies having increase in neck pain with added exercises  He fatigues quickly with gaze stabilization exercises and thus encouraged pt to continue to be compliant with exercises  He demonstrates most fatigue/increase in sxs with convergence  Patient demonstrated fatigue post treatment and would benefit from continued PT      Plan: Continue per plan of care        Precautions: standard     HEP: convergence, Smooth pursuits all directions    Specialty Daily Treatment Diary       Manual     TPR/STM cervical SMF SMF SMF SMF    PA c/s jt mobs SMF SMF SMF SMF    Rib jt mobs; clavicle inferior jt mob SMF SMF SMF SMF    Neck stretching SMF SMF SMF SMF    IASTM SMF SMF SMF SMF    Exercise Diary         UBE 6 min retro 6 min retro 6 min retro 6 min retro    Upper trap stretch 20 sec x 3 20 sec x 3  20 sec x 3 20 sec x 3    Levator scap stretch 20 sec x 3 20 sec x 3  20 sec x 3 20 sec x 3    SCM stretch 20 sec x 3 20 sec x 3  20 sec x 3 20 sec x 3    Chin tucks (supine) Sitinx Supine: 30x Supine: 15  Sittinx Sitting: 15  Supine: 3x10    Isometrics c/s        L Rib jt mob SOS 20x 30x c SOS 30x c SOS 30x SOS    Scap Retraction Light Blue: 30 Light Blue: 30 Light Blue: 30x Light Blue: 30x    B S' Ext Light Blue: 30x Light Blue: 30x Light Blue: 30x Light Blue: 30x    Scaption        B ER GTB 3x10 BTB 3x10 BTB 3x10 BTB 3x10    B Horiz Abd GTB 3x10 BTB 3x10 BTB 3x10 BTB 3x10 Pec stretch 20 sec x 3 20 sec x 3 20 sec x 3 20 sec x 3    Wall Pushup 25x 30 30 30    Wall ball circles 4# 3x15 cw, ccw 4# 3x15 cw, ccw 4# 3x15 cw, ccw 4# 3x15 cw, ccw    SNAGs  Ext: 30; Rot: 30; Lat flex: 30 Ext/Rot/Lat flex: 30x  Ext/Rot/Lat flex 30x Ext/Rot/Lat flex 30x    Prone Y  0# 3x10 c chin tuck  0# 3x10 c chin tuck 0# 3x10 c chin tuck    Prone T 0# 3x10 c chin tuck  1# 3x10 c chin tuck  1#,(R) 0# (L) 3x10 c chin tuck 1# 3x10 c chin tuck    Prone I 2# 3x10 c chin tuck 2# 3x10 c chin tuck 2# 3x10 c chin tuck 2# 3x10 c chin tuck    Foam Roller 2 min 2 min  2 min  2 min     Smooth Pursuit   3 min all directions and convergence 3 min (smooth pursuit all directions and convergence)    Modalities        MH   10 min c stretching 10 min     CP 10 min post Dec 5 mins       Skin checks performed pre and post application and intact

## 2019-07-31 ENCOUNTER — OFFICE VISIT (OUTPATIENT)
Dept: PHYSICAL THERAPY | Facility: CLINIC | Age: 24
End: 2019-07-31
Payer: COMMERCIAL

## 2019-07-31 DIAGNOSIS — R07.9 CHEST PAIN, UNSPECIFIED TYPE: ICD-10-CM

## 2019-07-31 DIAGNOSIS — M54.2 CHRONIC NECK PAIN: Primary | ICD-10-CM

## 2019-07-31 DIAGNOSIS — G89.29 CHRONIC NECK PAIN: Primary | ICD-10-CM

## 2019-07-31 PROCEDURE — 97140 MANUAL THERAPY 1/> REGIONS: CPT | Performed by: PHYSICAL THERAPIST

## 2019-07-31 PROCEDURE — 97110 THERAPEUTIC EXERCISES: CPT | Performed by: PHYSICAL THERAPIST

## 2019-07-31 PROCEDURE — 97010 HOT OR COLD PACKS THERAPY: CPT | Performed by: PHYSICAL THERAPIST

## 2019-07-31 PROCEDURE — 97112 NEUROMUSCULAR REEDUCATION: CPT | Performed by: PHYSICAL THERAPIST

## 2019-07-31 NOTE — PROGRESS NOTES
Daily Note     Today's date: 2019  Patient name: Diallo Shah  : 1995  MRN: [id number]  Referring provider: Naren Sherman DO  Dx:   Encounter Diagnosis     ICD-10-CM    1  Chronic neck pain M54 2     G89 29    2  Chest pain, unspecified type R07 9                   Subjective: Pt reports that he felt really dizzy the other day and unsure as to why while he was at work  He continues to report that headache and L SCM pain  Objective: See treatment diary below      Assessment: Tolerated treatment well; he requires occasional vcs on proper positioning with exercises  Held gaze stabilization today and encouraged him to continue performing exercises at home  He denies having increase in sxs throughout program  He requires vcs on sequencing with exercises  Performed static stretching with MH at end of program  Patient demonstrated fatigue post treatment and would benefit from continued PT      Plan: Continue per plan of care        Precautions: standard     HEP: convergence, Smooth pursuits all directions    Specialty Daily Treatment Diary       Manual    TPR/STM cervical SMF SMF SMF SMF SMF   PA c/s jt mobs SMF SMF SMF SMF SMF   Rib jt mobs; clavicle inferior jt mob SMF SMF SMF SMF SMF   Neck stretching SMF SMF SMF SMF SMF   IASTM SMF SMF SMF SMF SMF   Exercise Diary         UBE 6 min retro 6 min retro 6 min retro 6 min retro 6 min retro   Upper trap stretch 20 sec x 3 20 sec x 3  20 sec x 3 20 sec x 3 20 sec x 3   Levator scap stretch 20 sec x 3 20 sec x 3  20 sec x 3 20 sec x 3 20 sec x 3   SCM stretch 20 sec x 3 20 sec x 3  20 sec x 3 20 sec x 3 20 sec x 3   Chin tucks (supine) Sitinx Supine: 30x Supine: 15  Sittinx Sitting: 15  Supine: 3x10 Sittinx   Isometrics c/s        L Rib jt mob SOS 20x 30x c SOS 30x c SOS 30x SOS NV   Scap Retraction Light Blue: 30 Light Blue: 30 Light Blue: 30x Light Blue: 30x Light Blue: 30   B S' Ext Light Blue: 30x Light Blue: 30x Light Blue: 30x Light Blue: 30x Light Blue: 30   Scaption/ Abduction     2# 3x10   B ER GTB 3x10 BTB 3x10 BTB 3x10 BTB 3x10 BTB 3x10   B Horiz Abd GTB 3x10 BTB 3x10 BTB 3x10 BTB 3x10 BTB 3x10   Pec stretch 20 sec x 3 20 sec x 3 20 sec x 3 20 sec x 3 20 sec x 3   Wall Pushup 25x 30 30 30 30   Wall ball circles 4# 3x15 cw, ccw 4# 3x15 cw, ccw 4# 3x15 cw, ccw 4# 3x15 cw, ccw 4# 3x15 cw, ccw   SNAGs  Ext: 30; Rot: 30; Lat flex: 30 Ext/Rot/Lat flex: 30x  Ext/Rot/Lat flex 30x Ext/Rot/Lat flex 30x Ext/Rot/Lat flex: 30   Prone Y  0# 3x10 c chin tuck  0# 3x10 c chin tuck 0# 3x10 c chin tuck 1# 3x10 c chin tuck   Prone T 0# 3x10 c chin tuck  1# 3x10 c chin tuck  1#,(R) 0# (L) 3x10 c chin tuck 1# 3x10 c chin tuck 0# 3x10 c chin tuck   Prone I 2# 3x10 c chin tuck 2# 3x10 c chin tuck 2# 3x10 c chin tuck 2# 3x10 c chin tuck 2# 3x10 c chin tuck   Foam Roller 2 min 2 min  2 min  2 min  2 mins   Smooth Pursuit   3 min all directions and convergence 3 min (smooth pursuit all directions and convergence) NV   Modalities        MH   10 min c stretching 10 min  10 min    CP 10 min post Dec 5 mins       Skin checks performed pre and post application and intact

## 2019-08-05 ENCOUNTER — OFFICE VISIT (OUTPATIENT)
Dept: PHYSICAL THERAPY | Facility: CLINIC | Age: 24
End: 2019-08-05
Payer: COMMERCIAL

## 2019-08-05 DIAGNOSIS — G89.29 CHRONIC NECK PAIN: Primary | ICD-10-CM

## 2019-08-05 DIAGNOSIS — M54.2 CHRONIC NECK PAIN: Primary | ICD-10-CM

## 2019-08-05 DIAGNOSIS — R07.9 CHEST PAIN, UNSPECIFIED TYPE: ICD-10-CM

## 2019-08-05 PROCEDURE — 97110 THERAPEUTIC EXERCISES: CPT | Performed by: PHYSICAL THERAPIST

## 2019-08-05 PROCEDURE — 97112 NEUROMUSCULAR REEDUCATION: CPT | Performed by: PHYSICAL THERAPIST

## 2019-08-05 PROCEDURE — 97010 HOT OR COLD PACKS THERAPY: CPT | Performed by: PHYSICAL THERAPIST

## 2019-08-05 PROCEDURE — 97140 MANUAL THERAPY 1/> REGIONS: CPT | Performed by: PHYSICAL THERAPIST

## 2019-08-05 NOTE — PROGRESS NOTES
PT Evaluation (Progress Note)    Today's date: 2019  Patient name: Adeel Funes  : 1995  MRN: [id number]  Referring provider: Jagjit Ron DO  Dx:   Encounter Diagnosis     ICD-10-CM    1  Chronic neck pain M54 2     G89 29    2  Chest pain, unspecified type R07 9                   Assessment  Assessment details: Adeel Funes  is a 24 y o  male presenting to outpatient physical therapy at [place] [address] with complaints of left sided neck pain and L chest pain that began approximately 2 5 years ago  Pt reports that he has overall a 50% improvement since I E however he continues to have the same level of pain  He has had injections in his neck approximately one month ago however without relief  He continues to present with possible post concussive syndrome which he has been limited by tolerance with initiation of exercises in therapy  He presents with decreased range of motion, decreased strength, limited flexibility, poor postural awareness, poor body mechanics, poor balance, decreased tolerance to activity and decreased functional mobility due to Chronic neck pain and chest pain, unspecified type  He would benefit from skilled PT services in order to address these deficits and reach maximum level of function   Thank you for the referral!  Impairments: abnormal muscle firing, abnormal muscle tone, abnormal or restricted ROM, activity intolerance, impaired physical strength, lacks appropriate home exercise program, pain with function, scapular dyskinesis, poor posture  and poor body mechanics    Symptom irritability: moderateUnderstanding of Dx/Px/POC: good   Prognosis: good    Goals  STG (in 4 weeks):  1  Pt will be independent with HEP - Progressing towards  2  Pt will report having at most a 2/10 pain - Progressing towards    LTGs (in 8 weeks)  1  Pt will report having a 75% improvement since I E - Progressing towards  2   Pt will be able to return to exercises and recreational activities with minimal modifications without increasing sxs - Progressing towards  3  Pt will be able to sit for prolonged periods without increasing pain - Progressing towards     Plan  Planned modality interventions: TENS, ultrasound and unattended electrical stimulation  Planned therapy interventions: therapeutic activities, therapeutic exercise, patient education, neuromuscular re-education, strengthening, stretching, home exercise program, manual therapy and joint mobilization  Frequency: 3x week  Duration in weeks: 8  Plan of Care beginning date: 19  Plan of Care expiration date: 19  Treatment plan discussed with: patient        Subjective Evaluation    History of Present Illness  Mechanism of injury: Pt is a 24year old male presents with L chest pain and L sided neck pain that began approximately 2 5 years ago secondary to unknown etiology  He reports that he works 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 and started feeling "worn down" with progressive chest pain with head pain  He also noticing having shortness of breath and dizziness even when at rest  He has been treated for Lyme's disease and then when he was tested again recently, he was still positive so he was placed on another round of treatment   Pt is planning on seeing a pulmonologist      PMhx: history of head trauma at work, light sensitivity   Pain  Current pain ratin  At best pain ratin  At worst pain ratin  Quality: discomfort, pulling, sharp, pressure and dull ache - radiating to the R side of his head that can be sharp at times  Relieving factors: relaxation and rest  Aggravating factors: running, lifting, sitting and overhead activity (any more strenous activity, prolonged sitting)  Progression: pain is the same however functionally improving     Hand dominance: left      Diagnostic Tests  X-ray: normal  Treatments  Previous treatment: physical therapy  Patient Goals  Patient goals for therapy: decreased pain, increased motion, increased strength, independence with ADLs/IADLs, return to sport/leisure activities and return to work (Be able to sit for prolonged periods in order to attend college)          Objective       Flowsheet Rows      Most Recent Value   PT/OT G-Codes   Current Score 47   Projected Score  52        Objective: (* = pain on L neck)  AROM: standing    Cervical Flexion 13 degrees     Cervical Extension 50 degrees*   Cervical Lat Flex (R) 40 degrees  (L) 40 degrees   Cervical Rotation (R) 65 degrees* (L) 75 degrees    FIS: 75 degrees  EIS: 20 degrees  Rotation: (R) 4 cm (L) 4 cm  Lat Flex: (R) 25 degrees (L) 20 degrees    B shoulders AROM is WNL    STRENGTH:    R   L    Shoulder flexion  4/5   4-/5  Shoulder abduction  4-/5   4-/5  Shoulder ER   4/5   4/5  Shoulder IR   4/5   4+/5  Upper trap   4/5   4/5  Mid trap   4/5   4-/5*  Lower trap   4/5   3/5    Posture: Pt's sitting posture is rounded shoulders  He demonstrates scap winging  Mechanical assessment:  Repeated cervical flexion increases neck pain however repeated cervical extension caused more pain       Special Tests: (-) cervical distraction    Post Concussive syndrome TBA    Precautions: standard     HEP: B ER, Bilateral Horizontal ABD     Specialty Daily Treatment Diary       Manual    TPR/STM cervical SMF    SMF   PA c/s jt mobs SMF    SMF   Rib jt mobs; clavicle inferior jt mob SMF    SMF   Neck stretching SMF    SMF   IASTM SMF    SMF   Exercise Diary         UBE 6 min retro    6 min retro   Upper trap stretch 20 sec x 3    20 sec x 3   Levator scap stretch 20 sec x 3     20 sec x 3   SCM stretch 20 sec x 3    20 sec x 3   Chin tucks (supine) Sittinx    Sittinx   Isometrics c/s        L Rib jt mob 30 ea    NV   Scap Retraction Light Blue: 30    Light Blue: 30   B S' Ext Light Blue: 30    Light Blue: 30   Scaption/ Abduction 2# 3x10    2# 3x10   B ER BTB 3x10 DC   BTB 3x10   B Horiz Abd BTB 3x10    BTB 3x10   Pec stretch 30 sec x 3     20 sec x 3   Wall Pushup 30    30   Wall ball circles 4# 3x15 cw, ccw    4# 3x15 cw, ccw   SNAGs      Ext/Rot/Lat flex: 30   Prone Y 1# 3x10 c chin tuck    1# 3x10 c chin tuck   Prone T 0# 3x10 c chin tuck    0# 3x10 c chin tuck   Prone I 2# 3x10 c chin tuck    2# 3x10 c chin tuck   Foam Roller 2 min     2 mins   Smooth Pursuit; saccades 10 ea    NV   Modalities        MH 10 min     10 min    CP          Skin checks performed pre and post application and intact

## 2019-08-07 ENCOUNTER — OFFICE VISIT (OUTPATIENT)
Dept: PHYSICAL THERAPY | Facility: CLINIC | Age: 24
End: 2019-08-07
Payer: COMMERCIAL

## 2019-08-07 DIAGNOSIS — R07.9 CHEST PAIN, UNSPECIFIED TYPE: ICD-10-CM

## 2019-08-07 DIAGNOSIS — G89.29 CHRONIC NECK PAIN: Primary | ICD-10-CM

## 2019-08-07 DIAGNOSIS — M54.2 CHRONIC NECK PAIN: Primary | ICD-10-CM

## 2019-08-07 PROCEDURE — 97140 MANUAL THERAPY 1/> REGIONS: CPT | Performed by: PHYSICAL THERAPIST

## 2019-08-07 PROCEDURE — 97112 NEUROMUSCULAR REEDUCATION: CPT | Performed by: PHYSICAL THERAPIST

## 2019-08-07 PROCEDURE — 97110 THERAPEUTIC EXERCISES: CPT | Performed by: PHYSICAL THERAPIST

## 2019-08-07 PROCEDURE — 97010 HOT OR COLD PACKS THERAPY: CPT | Performed by: PHYSICAL THERAPIST

## 2019-08-07 NOTE — PROGRESS NOTES
Daily Note     Today's date: 2019  Patient name: Shant Moran  : 1995  MRN: [id number]  Referring provider: Jhoan León DO  Dx:   Encounter Diagnosis     ICD-10-CM    1  Chronic neck pain M54 2     G89 29    2  Chest pain, unspecified type R07 9                   Subjective: Pt reports that he feels "more off today " He reports having the pressure in his head  Objective: See treatment diary below      Assessment: Tolerated treatment well; initiated POC with UBE f/b strengthening exercises  Performed wall walks today with increase challenge  Patient demonstrated fatigue post treatment and would benefit from continued PT      Plan: Continue per plan of care        Precautions: standard     HEP: convergence, Smooth pursuits all directions    Specialty Daily Treatment Diary       Manual    TPR/STM cervical SMF    SMF   PA c/s jt mobs SMF    SMF   Rib jt mobs; clavicle inferior jt mob SMF    SMF   Neck stretching SMF    SMF   IASTM     SMF   Exercise Diary         UBE 6 min retro    6 min retro   Upper trap stretch 20 sec x 3    20 sec x 3   Levator scap stretch 20 sec x 3    20 sec x 3   SCM stretch 20 sec x 3    20 sec x 3   Chin tucks (supine) Sittinx    Sittinx   Isometrics c/s        L Rib jt mob 30x    NV   Scap Retraction Light Blue: 30    Light Blue: 30   B S' Ext Light Blue: 30    Light Blue: 30   Scaption/ Abduction 2# 3x10    2# 3x10   Wall Walks OTB to fatigue               Pec stretch 20 sec x 3    20 sec x 3   Wall Pushup 30x    30   Wall ball circles 4# 3x15 cw, ccw    4# 3x15 cw, ccw   SNAGs  Ext/Rot/Lat flex: 30    Ext/Rot/Lat flex: 30   Prone Y 1# 3x10 c chin tuck    1# 3x10 c chin tuck   Prone T 0# 3x10 c chin tuck    0# 3x10 c chin tuck   Prone I 2# 3x10 chin tuck    2# 3x10 c chin tuck   Foam Roller 2 mins    2 mins   Smooth Pursuit NV    NV   Modalities        MH 10 min     10 min    CP          Skin checks performed pre and post application and intact

## 2019-08-12 ENCOUNTER — OFFICE VISIT (OUTPATIENT)
Dept: PHYSICAL THERAPY | Facility: CLINIC | Age: 24
End: 2019-08-12
Payer: COMMERCIAL

## 2019-08-12 DIAGNOSIS — G89.29 CHRONIC NECK PAIN: Primary | ICD-10-CM

## 2019-08-12 DIAGNOSIS — R07.9 CHEST PAIN, UNSPECIFIED TYPE: ICD-10-CM

## 2019-08-12 DIAGNOSIS — M54.2 CHRONIC NECK PAIN: Primary | ICD-10-CM

## 2019-08-12 PROCEDURE — 97140 MANUAL THERAPY 1/> REGIONS: CPT

## 2019-08-12 PROCEDURE — 97110 THERAPEUTIC EXERCISES: CPT

## 2019-08-12 NOTE — PROGRESS NOTES
Daily Note     Today's date: 2019  Patient name: Klarissa Webber  : 1995  MRN: [id number]  Referring provider: Amber Abdalla DO  Dx:   Encounter Diagnosis     ICD-10-CM    1  Chronic neck pain M54 2     G89 29    2  Chest pain, unspecified type R07 9                   Subjective: Pt states that he has some pain on the L side of his neck, and his chest   He reports that his chest is bothering him a little more today than his neck  Objective: See treatment diary below      Assessment: Tolerated treatment well  Pt has continued sx's during session, but reports no increase with exercise  Decreased pain noted after manual therapy  Pt uses good form/technique and only requires few cues  Plan: Continue per plan of care     Precautions: standard     HEP: convergence, Smooth pursuits all directions    Specialty Daily Treatment Diary       Manual    TPR/STM cervical SMF TH   SMF   PA c/s jt mobs SMF BG   SMF   Rib jt mobs; clavicle inferior jt mob SMF BG   SMF   Neck stretching SMF TH   SMF   IASTM     SMF   Exercise Diary         UBE 6 min retro 6 min retro   6 min retro   Upper trap stretch 20 sec x 3 20 sec x 3   20 sec x 3   Levator scap stretch 20 sec x 3 20 sec x 3   20 sec x 3   SCM stretch 20 sec x 3 20 sec x3   20 sec x 3   Chin tucks (supine) Sittinx 20x   Sittinx   Isometrics c/s        L Rib jt mob 30x    NV   Scap Retraction Light Blue: 30 Lt  Blue 30x   Light Blue: 30   B S' Ext Light Blue: 30 Lt   Blue 30x   Light Blue: 30   Scaption/ Abduction 2# 3x10 2# 3x10   2# 3x10   Wall Walks OTB to fatigue OTB to fatigue              Pec stretch 20 sec x 3 20 sec x3   20 sec x 3   Wall Pushup 30x 30x   30   Wall ball circles 4# 3x15 cw, ccw 4# 3x15 cw, ccw   4# 3x15 cw, ccw   SNAGs  Ext/Rot/Lat flex: 30    Ext/Rot/Lat flex: 30   Prone Y 1# 3x10 c chin tuck 1# 3x10 c chin tuck   1# 3x10 c chin tuck   Prone T 0# 3x10 c chin tuck 0# 3x10 c chin tuck   0# 3x10 c chin tuck   Prone I 2# 3x10 chin tuck 2# 3x10 chin tuck   2# 3x10 c chin tuck   Foam Roller 2 mins    2 mins   Smooth Pursuit NV    NV   Modalities        MH 10 min  np   10 min    CP          Skin checks performed pre and post application and intact

## 2019-08-14 ENCOUNTER — OFFICE VISIT (OUTPATIENT)
Dept: PHYSICAL THERAPY | Facility: CLINIC | Age: 24
End: 2019-08-14
Payer: COMMERCIAL

## 2019-08-14 DIAGNOSIS — M54.2 CHRONIC NECK PAIN: Primary | ICD-10-CM

## 2019-08-14 DIAGNOSIS — G89.29 CHRONIC NECK PAIN: Primary | ICD-10-CM

## 2019-08-14 DIAGNOSIS — R07.9 CHEST PAIN, UNSPECIFIED TYPE: ICD-10-CM

## 2019-08-14 PROCEDURE — 97010 HOT OR COLD PACKS THERAPY: CPT | Performed by: PHYSICAL THERAPIST

## 2019-08-14 PROCEDURE — 97110 THERAPEUTIC EXERCISES: CPT | Performed by: PHYSICAL THERAPIST

## 2019-08-14 PROCEDURE — 97140 MANUAL THERAPY 1/> REGIONS: CPT | Performed by: PHYSICAL THERAPIST

## 2019-08-14 NOTE — PROGRESS NOTES
Daily Note     Today's date: 2019  Patient name: Mitra Arndt  : 1995  MRN: [id number]  Referring provider: Vinayak Washington DO  Dx:   Encounter Diagnosis     ICD-10-CM    1  Chronic neck pain M54 2     G89 29    2  Chest pain, unspecified type R07 9                   Subjective: Pt reports that he continues to have difficulty breathing and continued chest pain  Objective: See treatment diary below      Assessment: Tolerated treatment well; he requires vcs on proper sequencing with exercises  Pt was challenged with exercise progression with theraband  He denies having pain during VOR exercise today except for some neck discomfort  Discussed DOMS with patient and using modalities at home with gentle stretching  Patient demonstrated fatigue post treatment and would benefit from continued PT      Plan: Continue per plan of care  Precautions: standard     HEP: convergence, Smooth pursuits all directions    Specialty Daily Treatment Diary       Manual    TPR/STM cervical SMF TH SMF  SMF   PA c/s jt mobs SMF BG SMF  SMF   Rib jt mobs; clavicle inferior jt mob SMF BG SMF  SMF   Neck stretching SMF TH SMF  SMF   IASTM   SMF  SMF   Exercise Diary         UBE 6 min retro 6 min retro 6 min retro  6 min retro   Upper trap stretch 20 sec x 3 20 sec x 3 20 sec x 3  20 sec x 3   Levator scap stretch 20 sec x 3 20 sec x 3 20 sec x 3  20 sec x 3   SCM stretch 20 sec x 3 20 sec x3 20 sec x 3  20 sec x 3   Chin tucks (supine) Sittinx 20x 20x  Sittinx   Isometrics c/s        L Rib jt mob 30x    NV   Scap Retraction Light Blue: 30 Lt  Blue 30x Lt Blue 30x  Light Blue: 30   B S' Ext Light Blue: 30 Lt   Blue 30x Lt Blue 30x  Light Blue: 30   Scaption/ Abduction 2# 3x10 2# 3x10 NV  2# 3x10   Wall Walks OTB to fatigue OTB to fatigue OTB to fatigue     Flex/Ext Combo   2 rounds of each to fatigue     Pec stretch 20 sec x 3 20 sec x3 30 sec x 3  20 sec x 3   Wall Pushup 30x 30x 30x 27   Wall ball circles 4# 3x15 cw, ccw 4# 3x15 cw, ccw DC  4# 3x15 cw, ccw   SNAGs  Ext/Rot/Lat flex: 30    Ext/Rot/Lat flex: 30   Prone Y 1# 3x10 c chin tuck 1# 3x10 c chin tuck 1# 3x10 chin tuck  1# 3x10 c chin tuck   Prone T 0# 3x10 c chin tuck 0# 3x10 c chin tuck 0# 3x10 c chin tuck  0# 3x10 c chin tuck   Prone I 2# 3x10 chin tuck 2# 3x10 chin tuck 2# 3x10 chin tuck   2# 3x10 c chin tuck   Foam Roller 2 mins    2 mins   Saccades; VOR   10x; 30 sec x 1 vert & horiz  NV   Modalities        MH 10 min  np 10 min   10 min    CP          Skin checks performed pre and post application and intact

## 2019-08-19 ENCOUNTER — OFFICE VISIT (OUTPATIENT)
Dept: PHYSICAL THERAPY | Facility: CLINIC | Age: 24
End: 2019-08-19
Payer: COMMERCIAL

## 2019-08-19 DIAGNOSIS — M54.2 CHRONIC NECK PAIN: Primary | ICD-10-CM

## 2019-08-19 DIAGNOSIS — G89.29 CHRONIC NECK PAIN: Primary | ICD-10-CM

## 2019-08-19 DIAGNOSIS — R07.9 CHEST PAIN, UNSPECIFIED TYPE: ICD-10-CM

## 2019-08-19 PROCEDURE — 97112 NEUROMUSCULAR REEDUCATION: CPT | Performed by: PHYSICAL THERAPIST

## 2019-08-19 PROCEDURE — 97110 THERAPEUTIC EXERCISES: CPT | Performed by: PHYSICAL THERAPIST

## 2019-08-19 PROCEDURE — 97010 HOT OR COLD PACKS THERAPY: CPT | Performed by: PHYSICAL THERAPIST

## 2019-08-19 PROCEDURE — 97140 MANUAL THERAPY 1/> REGIONS: CPT | Performed by: PHYSICAL THERAPIST

## 2019-08-19 NOTE — PROGRESS NOTES
Daily Note     Today's date: 2019  Patient name: Mak Hernandez  : 1995  MRN: [id number]  Referring provider: Ciaran Alfredo DO  Dx:   Encounter Diagnosis     ICD-10-CM    1  Chronic neck pain M54 2     G89 29    2  Chest pain, unspecified type R07 9                   Subjective: Pt reports that he feels okay today; he continues to have difficulty breathing over the weekend  Objective: See treatment diary below      Assessment: Tolerated treatment well; initiated POC with UBE  Progressed VOR dynamically  He fatigues with fatigue exercises today  Encouraged pt to perform smooth pursuit exercises to his fatigue and to try unilaterally  Discussed DOMS today with UT and encouraged pt to perform stretching throughout the day and use modalities  Patient demonstrated fatigue post treatment and would benefit from continued PT      Plan: Continue per plan of care  Progress strength when appropriate  Precautions: standard     HEP: convergence, Smooth pursuits all directions    Specialty Daily Treatment Diary       Manual     TPR/STM cervical SMF TH SMF SMF    PA c/s jt mobs SMF BG SMF SMF    Rib jt mobs; clavicle inferior jt mob SMF BG SMF SMF    Neck stretching SMF TH SMF SMF    IASTM   SMF SMF    Exercise Diary         UBE 6 min retro 6 min retro 6 min retro 6 min retro     Upper trap stretch 20 sec x 3 20 sec x 3 20 sec x 3 20 sec x 3    Levator scap stretch 20 sec x 3 20 sec x 3 20 sec x 3 20 sec x 3    SCM stretch 20 sec x 3 20 sec x3 20 sec x 3 20 sec x 3    Chin tucks (supine) Sittinx 20x 20x 20x    Isometrics c/s        L Rib jt mob 30x       Scap Retraction Light Blue: 30 Lt  Blue 30x Lt Blue 30x Lt Blue 30x     B S' Ext Light Blue: 30 Lt   Blue 30x Lt Blue 30x Lt Blue 30x    Scaption/ Abduction 2# 3x10 2# 3x10 NV 2# 3x10    Wall Walks OTB to fatigue OTB to fatigue OTB to fatigue OTB to fatigue    Flex/Ext Combo   2 rounds of each to fatigue OTB 2 rounds of each to fatigue    Pec stretch 20 sec x 3 20 sec x3 30 sec x 3 30 sec x 3    Wall Pushup 30x 30x 30x 30x            SNAGs  Ext/Rot/Lat flex: 30       Prone Y 1# 3x10 c chin tuck 1# 3x10 c chin tuck 1# 3x10 chin tuck 1# 3x10 chin tuck    Prone T 0# 3x10 c chin tuck 0# 3x10 c chin tuck 0# 3x10 c chin tuck 0# 3x10 c chin tuck    Prone I 2# 3x10 chin tuck 2# 3x10 chin tuck 2# 3x10 chin tuck  2# 3x10 c chin tuck    Foam Roller 2 mins       Saccades; VOR   10x; 30 sec x 1 vert & horiz 10x; dynamic 15 ft x 2 fwd and retro    Modalities        MH 10 min  np 10 min  10 min     CP          Skin checks performed pre and post application and intact

## 2019-08-21 ENCOUNTER — OFFICE VISIT (OUTPATIENT)
Dept: PHYSICAL THERAPY | Facility: CLINIC | Age: 24
End: 2019-08-21
Payer: COMMERCIAL

## 2019-08-21 DIAGNOSIS — G89.29 CHRONIC NECK PAIN: Primary | ICD-10-CM

## 2019-08-21 DIAGNOSIS — M54.2 CHRONIC NECK PAIN: Primary | ICD-10-CM

## 2019-08-21 DIAGNOSIS — R07.9 CHEST PAIN, UNSPECIFIED TYPE: ICD-10-CM

## 2019-08-21 PROCEDURE — 97010 HOT OR COLD PACKS THERAPY: CPT | Performed by: PHYSICAL THERAPIST

## 2019-08-21 PROCEDURE — 97140 MANUAL THERAPY 1/> REGIONS: CPT | Performed by: PHYSICAL THERAPIST

## 2019-08-21 PROCEDURE — 97112 NEUROMUSCULAR REEDUCATION: CPT | Performed by: PHYSICAL THERAPIST

## 2019-08-21 PROCEDURE — 97110 THERAPEUTIC EXERCISES: CPT | Performed by: PHYSICAL THERAPIST

## 2019-08-21 NOTE — PROGRESS NOTES
Daily Note     Today's date: 2019  Patient name: Hilario Rooney  : 1995  MRN: [id number]  Referring provider: Cortes Villar DO  Dx:   Encounter Diagnosis     ICD-10-CM    1  Chronic neck pain M54 2     G89 29    2  Chest pain, unspecified type R07 9                   Subjective: Pt reports that he feels okay today  Objective: See treatment diary below      Assessment: Tolerated treatment well; pt initiates POC with UBE  Pt performs exercise progression with exercises  He requires vcs on proper sequencing with exercises  Patient demonstrated fatigue post treatment and would benefit from continued PT      Plan: Continue per plan of care  Precautions: standard     HEP: convergence, Smooth pursuits all directions    Specialty Daily Treatment Diary       Manual    TPR/STM cervical SMF TH SMF SMF SMF   PA c/s jt mobs SMF BG SMF SMF SMF   Rib jt mobs; clavicle inferior jt mob SMF BG SMF SMF SMF   Neck stretching SMF TH SMF SMF SMF   IASTM   SMF SMF SMF   Exercise Diary         UBE 6 min retro 6 min retro 6 min retro 6 min retro  6 min retro   Upper trap stretch 20 sec x 3 20 sec x 3 20 sec x 3 20 sec x 3 20 sec x 3   Levator scap stretch 20 sec x 3 20 sec x 3 20 sec x 3 20 sec x 3 20 sec x 3   SCM stretch 20 sec x 3 20 sec x3 20 sec x 3 20 sec x 3 20 sec x 3   Chin tucks (supine) Sittinx 20x 20x 20x 15 sitting; 15 supine   Isometrics c/s        L Rib jt mob 30x       Scap Retraction Light Blue: 30 Lt  Blue 30x Lt Blue 30x Lt Blue 30x  Light Blue: 30x   B S' Ext Light Blue: 30 Lt   Blue 30x Lt Blue 30x Lt Blue 30x Light Blue: 30x   Scaption/ Abduction 2# 3x10 2# 3x10 NV 2# 3x10 2# 3x10   Wall Walks OTB to fatigue OTB to fatigue OTB to fatigue OTB to fatigue OTB to fatigue   Flex/Ext Combo   2 rounds of each to fatigue OTB 2 rounds of each to fatigue OTB 2 founds to each to fatigue    Pec stretch 20 sec x 3 20 sec x3 30 sec x 3 30 sec x 3 30 sec x 3   Wall Pushup 30x 30x 30x 30x 30x           SNAGs  Ext/Rot/Lat flex: 30       Prone Y on PB 1# 3x10 c chin tuck 1# 3x10 c chin tuck 1# 3x10 chin tuck 1# 3x10 chin tuck 1# 3x10 c chin tuck   Prone T on PB 0# 3x10 c chin tuck 0# 3x10 c chin tuck 0# 3x10 c chin tuck 0# 3x10 c chin tuck 0# 3x10 c chin tuck   Prone I on PB 2# 3x10 chin tuck 2# 3x10 chin tuck 2# 3x10 chin tuck  2# 3x10 c chin tuck 2# 3x10 c chin tuck   Foam Roller 2 mins    2 mins   Saccades; VOR   10x; 30 sec x 1 vert & horiz 10x; dynamic 15 ft x 2 fwd and retro    Modalities        MH 10 min  np 10 min  10 min  10 min    CP          Skin checks performed pre and post application and intact

## 2019-08-26 ENCOUNTER — APPOINTMENT (OUTPATIENT)
Dept: PHYSICAL THERAPY | Facility: CLINIC | Age: 24
End: 2019-08-26
Payer: COMMERCIAL

## 2019-08-27 ENCOUNTER — OFFICE VISIT (OUTPATIENT)
Dept: PHYSICAL THERAPY | Facility: CLINIC | Age: 24
End: 2019-08-27
Payer: COMMERCIAL

## 2019-08-27 DIAGNOSIS — R07.9 CHEST PAIN, UNSPECIFIED TYPE: ICD-10-CM

## 2019-08-27 DIAGNOSIS — M54.2 CHRONIC NECK PAIN: Primary | ICD-10-CM

## 2019-08-27 DIAGNOSIS — G89.29 CHRONIC NECK PAIN: Primary | ICD-10-CM

## 2019-08-27 PROCEDURE — 97010 HOT OR COLD PACKS THERAPY: CPT | Performed by: PHYSICAL THERAPIST

## 2019-08-27 PROCEDURE — 97140 MANUAL THERAPY 1/> REGIONS: CPT | Performed by: PHYSICAL THERAPIST

## 2019-08-27 PROCEDURE — 97110 THERAPEUTIC EXERCISES: CPT | Performed by: PHYSICAL THERAPIST

## 2019-08-27 PROCEDURE — 97112 NEUROMUSCULAR REEDUCATION: CPT | Performed by: PHYSICAL THERAPIST

## 2019-08-27 NOTE — PROGRESS NOTES
Daily Note     Today's date: 2019  Patient name: Chaparrita Kelly  : 1995  MRN: [id number]  Referring provider: Campos Fernandez DO  Dx:   Encounter Diagnosis     ICD-10-CM    1  Chronic neck pain M54 2     G89 29    2  Chest pain, unspecified type R07 9                   Subjective: Pt reports that while he was away on vacation he had a lot of neck and chest pain that increased while he was walking a lot of the beach  Objective: See treatment diary below      Assessment: Tolerated treatment well; initiated POC with UBE f/b manual therapy  Pt consented to trying cupping to his SCM  He denies having pain during manual therapy  Updated HEP and reviewed  Patient demonstrated fatigue post treatment and would benefit from continued PT      Plan: Continue per plan of care        Precautions: standard     HEP: convergence, Smooth pursuits all directions    Specialty Daily Treatment Diary       Manual    TPR/STM cervical SMF    SMF   PA c/s jt mobs SMF    SMF   Rib jt mobs; clavicle inferior jt mob SMF    SMF   Neck stretching SMF    SMF   IASTM SMF    SMF   Exercise Diary         UBE 6 min retro    6 min retro   Upper trap stretch 20 sec x 3    20 sec x 3   Levator scap stretch 20 sec x 3     20 sec x 3   SCM stretch 20 sec x 3    20 sec x 3   Chin tucks (supine) Supine: 20x    15 sitting; 15 supine   Isometrics c/s HEP       L Rib jt mob 2x10       Scap Retraction Light Blue: 30x    Light Blue: 30x   B S' Ext Light Blue: 30x    Light Blue: 30x   Scaption/ Abduction 2# 3x10    2# 3x10   Wall Walks OTB to fatigue    OTB to fatigue   Flex/Ext Combo OTB 2 rounds of each to fatigue    OTB 2 founds to each to fatigue    Pec stretch 30 sec x 3     30 sec x 3   Wall Pushup 30x    30x           SNAGs         Prone Y on PB 1# 3x10 c chin tuck    1# 3x10 c chin tuck   Prone T on PB 0# 3x10 c chin tuck tuck    0# 3x10 c chin tuck   Prone I on PB 2# 3x10 c chin tuck    2# 3x10 c chin tuck   Foam Roller 2 min    2 mins   Saccades; VOR        Modalities        MH 10 min     10 min    CP          Skin checks performed pre and post application and intact

## 2019-08-28 ENCOUNTER — OFFICE VISIT (OUTPATIENT)
Dept: PHYSICAL THERAPY | Facility: CLINIC | Age: 24
End: 2019-08-28
Payer: COMMERCIAL

## 2019-08-28 DIAGNOSIS — G89.29 CHRONIC NECK PAIN: Primary | ICD-10-CM

## 2019-08-28 DIAGNOSIS — M54.2 CHRONIC NECK PAIN: Primary | ICD-10-CM

## 2019-08-28 DIAGNOSIS — R07.9 CHEST PAIN, UNSPECIFIED TYPE: ICD-10-CM

## 2019-08-28 PROCEDURE — 97112 NEUROMUSCULAR REEDUCATION: CPT | Performed by: PHYSICAL THERAPIST

## 2019-08-28 PROCEDURE — 97140 MANUAL THERAPY 1/> REGIONS: CPT | Performed by: PHYSICAL THERAPIST

## 2019-08-28 PROCEDURE — 97010 HOT OR COLD PACKS THERAPY: CPT | Performed by: PHYSICAL THERAPIST

## 2019-08-28 NOTE — PROGRESS NOTES
Daily Note     Today's date: 2019  Patient name: Luz Maria Clark  : 1995  MRN: [id number]  Referring provider: Bon Asher DO  Dx:   Encounter Diagnosis     ICD-10-CM    1  Chronic neck pain M54 2     G89 29    2  Chest pain, unspecified type R07 9                   Subjective: Pt reports that he felt fine after last visit and then he went to work and had increased pain  Objective: See treatment diary below      Assessment: Tolerated treatment well; pt continues to demonstrate slow increase in strength however he continues to report having chest pain, breathing dificulties with neck discomfort  He requires vcs on proper sequencing with exercises but denies having increase in sxs  Patient demonstrated fatigue post treatment and would benefit from continued PT      Plan: Continue per plan of care        Precautions: standard     HEP: convergence, Smooth pursuits all directions    Specialty Daily Treatment Diary       Manual    TPR/STM cervical SMF SMF   SMF   PA c/s jt mobs SMF SMF   SMF   Rib jt mobs; clavicle inferior jt mob SMF SMF   SMF   Neck stretching SMF SMF   SMF   IASTM SMF SMF   SMF   Exercise Diary         UBE 6 min retro 6 min retro   6 min retro   Upper trap stretch 20 sec x 3 20 sec x 3   20 sec x 3   Levator scap stretch 20 sec x 3  20 sec x 3   20 sec x 3   SCM stretch 20 sec x 3 20 sec x 3   20 sec x 3   Chin tucks (supine) Supine: 20x Supine: 2x15   15 sitting; 15 supine   Isometrics c/s HEP       L Rib jt mob 2x10 2x10      Scap Retraction Light Blue: 30x Light Blue: 30x   Light Blue: 30x   B S' Ext Light Blue: 30x Light blue: 30x   Light Blue: 30x   Scaption/ Abduction 2# 3x10 3# 3x10   2# 3x10   Wall Walks OTB to fatigue OTB to fatigue   OTB to fatigue   Flex/Ext Combo OTB 2 rounds of each to fatigue OTB 2 rounds to each to fatigue   OTB 2 founds to each to fatigue    Pec stretch 30 sec x 3  30 sec x 3   30 sec x 3   Wall Pushup 30x 30x   30x SNAGs         Prone Y on PB 1# 3x10 c chin tuck 1# 3x10 c chin tuck   1# 3x10 c chin tuck   Prone T on PB 0# 3x10 c chin tuck 0# 3x10 c chin tuck   0# 3x10 c chin tuck   Prone I on PB 2# 3x10 c chin tuck 2# 3x10 c chin tuck   2# 3x10 c chin tuck   Foam Roller 2 min 2 min    2 mins   Saccades; VOR        Modalities        MH 10 min  10 min   10 min    CP          Skin checks performed pre and post application and intact

## 2019-09-02 ENCOUNTER — APPOINTMENT (OUTPATIENT)
Dept: PHYSICAL THERAPY | Facility: CLINIC | Age: 24
End: 2019-09-02
Payer: COMMERCIAL

## 2019-09-03 ENCOUNTER — OFFICE VISIT (OUTPATIENT)
Dept: PHYSICAL THERAPY | Facility: CLINIC | Age: 24
End: 2019-09-03
Payer: COMMERCIAL

## 2019-09-03 DIAGNOSIS — G89.29 CHRONIC NECK PAIN: Primary | ICD-10-CM

## 2019-09-03 DIAGNOSIS — M54.2 CHRONIC NECK PAIN: Primary | ICD-10-CM

## 2019-09-03 DIAGNOSIS — R07.9 CHEST PAIN, UNSPECIFIED TYPE: ICD-10-CM

## 2019-09-03 PROCEDURE — 97112 NEUROMUSCULAR REEDUCATION: CPT | Performed by: PHYSICAL THERAPIST

## 2019-09-03 PROCEDURE — 97140 MANUAL THERAPY 1/> REGIONS: CPT | Performed by: PHYSICAL THERAPIST

## 2019-09-03 NOTE — PROGRESS NOTES
Pulmonary Consultation   Brianna White  24 y o  male MRN: [id number]  9/4/2019      Reason for Consultation: Shortness of Breath    Requested By: Dr George El:  1  Dyspnea  · Noted prior negative cardiac evaluation  · Symptoms are not classic for asthma however do have some variability  · Spirometry normal today but with shortened expiratory time  · Will check complete PFTs and MCT   · Will start prn use of Albuterol HFA  · IF MCT/PFTs negative may consider CPEX testing - noted no change in HR during 6MWT ?effort  · Yearly flu vaccine encouraged    2  Chronic Headaches - per neurology    3  Fatigue - Per Stop-BANG - low risk for EMILE, however may consider PSG testing in future if fatigue persists and above pulmonary evaluation is negative    Plan:    Diagnoses and all orders for this visit:    Shortness of breath  -     POCT spirometry  -     POCT 6 minute walk  -     albuterol (VENTOLIN HFA) 90 mcg/act inhaler; Inhale 2 puffs every 6 (six) hours as needed for wheezing  -     Complete PFT with Methacholine Challenge; Future    SOB (shortness of breath)    Chronic migraine    Chronic fatigue        History of Present Illness   HPI:  Brianna White  is a 24 y o  male who has history of Lyme disease, chronic HAs and neck pain, and (+) HORTENCIA who presents for 2 5 years of dyspnea  He reports dyspnea started approximately 2-4 weeks after he had quit smoking  He reports variable nature to his dyspnea but typically exertional when lifting objects at work but sometimes walking fast distance or climbing stairs  He reported rare rest dyspnea  He denied associated chest pains or pleurisy, but did have prior palpitations  His dyspnea has not improved nor severely worsened over the last 2 years  He reported intermittent wheeze and intermittent GERD symptoms  He denied cough, sputum production, or hemoptysis  He denied prior inhaler or OCS use for respiratory symptoms   He denied frequent episodes of bronchitis or pneumonia  He notes dyspnea is worse with extremes of heat and cold but could not relate to perfumes or cleaning fume exposures  He has not had PFT testing or had any prior inhaler use  He denied history of prior respiratory disorders, no history of asthma  He reports being born full term and healthy  He denied respiratory disorders as child  He does report awakening fatigued but denied snoring or apneic episodes  Review of Systems   Constitutional: Positive for activity change and fatigue  Negative for chills, fever and unexpected weight change  HENT: Negative for congestion, dental problem, mouth sores, nosebleeds, postnasal drip, rhinorrhea, sore throat, trouble swallowing and voice change  Eyes: Negative for visual disturbance  Respiratory: Positive for shortness of breath and wheezing  Negative for cough and chest tightness  Cardiovascular: Negative for chest pain, palpitations and leg swelling  Gastrointestinal: Negative for abdominal pain, diarrhea, nausea and vomiting  Endocrine: Positive for cold intolerance and heat intolerance  Genitourinary: Negative for dysuria and hematuria  Musculoskeletal: Positive for arthralgias and neck pain  Negative for back pain, gait problem and myalgias  Skin: Negative for rash  Allergic/Immunologic: Negative for immunocompromised state  Neurological: Positive for headaches  Negative for syncope and light-headedness  Hematological: Negative for adenopathy  Psychiatric/Behavioral: Negative for agitation, dysphoric mood and sleep disturbance         Historical Information   Past Medical History:   Diagnosis Date    Dizziness     Frequent headaches     Heart burn     Hx of concussion     last assessed 3/28/17    Hypertension     Palpitations     Postconcussive syndrome 12/14/2017    SOB (shortness of breath)     Swelling     Weakness      Past Surgical History:   Procedure Laterality Date    DENTAL SURGERY      OK COLONOSCOPY FLX DX W/COLLJ SPEC WHEN PFRMD N/A 2/14/2019    Procedure: COLONOSCOPY;  Surgeon: Shania Boggs MD;  Location: [facility]; Service: Gastroenterology    TX ESOPHAGOGASTRODUODENOSCOPY TRANSORAL DIAGNOSTIC N/A 2/14/2019    Procedure: ESOPHAGOGASTRODUODENOSCOPY (EGD); Surgeon: Shania Boggs MD;  Location: [facility]; Service: Gastroenterology     Family History   Problem Relation Age of Onset    Hypertension Father     Stroke Father     Hyperlipidemia Father     Coronary artery disease Father     Hypertension Maternal Grandfather     Coronary artery disease Maternal Grandfather     Cancer Paternal Grandfather     Hyperlipidemia Paternal Grandfather     Coronary artery disease Paternal Grandfather     Hypertension Paternal Uncle     Stroke Paternal Uncle        Occupational History: works lumbar yard, 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    Social History: former smoker 1-2ppd x 10 years, started age 14 and quit age 24, pet turtle, no dog/cat exposures, no bird exposures    Meds/Allergies     Current Outpatient Medications:     multivitamin (THERAGRAN) TABS, Take 1 tablet by mouth daily, Disp: , Rfl:     Probiotic Product (PROBIOTIC-10 PO), Take by mouth, Disp: , Rfl:     ZOLMitriptan (ZOMIG) 5 MG tablet, Take 1/2-1 tab at onset of migraine, can repeat in 2 hours, Max 2 tabs in 24 hours  max 10mg/24 hours (Patient not taking: Reported on 6/20/2019), Disp: 10 tablet, Rfl: 3    Current Facility-Administered Medications:     AbobotulinumtoxinA SOLR 300 Units, 300 Units, Intramuscular, Q3 Months, Marlyn Barksdale MD, 300 Units at 07/16/19 6439  No Known Allergies    Vitals: Blood pressure 108/76, pulse 87, temperature 98 3 °F (36 8 °C), temperature source Tympanic, height 5' 6" (1 676 m), weight 61 2 kg (135 lb), SpO2 98 %  , Body mass index is 21 79 kg/m²   Oxygen Therapy  SpO2: 98 %  Oxygen Therapy: None (Room air)    Physical Exam  Physical Exam   Constitutional: He is oriented to person, place, and time  He appears well-developed and well-nourished  He does not appear ill  No distress  HENT:   Head: Normocephalic and atraumatic  Right Ear: External ear normal    Left Ear: External ear normal    Nose: Nose normal    Mouth/Throat: No oropharyngeal exudate  Mild posterior pharyngeal cobblestoning   Eyes: Pupils are equal, round, and reactive to light  Conjunctivae are normal  Right eye exhibits no discharge  Left eye exhibits no discharge  No scleral icterus  Neck: Neck supple  No JVD present  No tracheal deviation present  Cardiovascular: Normal rate, regular rhythm and normal heart sounds  No murmur heard  Pulmonary/Chest: Effort normal and breath sounds normal  No stridor  No respiratory distress  He has no wheezes  He has no rales  Clear, no wheeze, no rales   Abdominal: Soft  Bowel sounds are normal  He exhibits no distension  There is no tenderness  Musculoskeletal: He exhibits no edema or deformity  Lymphadenopathy:     He has no cervical adenopathy  Neurological: He is alert and oriented to person, place, and time  Skin: Skin is warm and dry  No rash noted  Psychiatric: He has a normal mood and affect  His behavior is normal    Vitals reviewed  Labs: I have personally reviewed pertinent lab results  Lab Results   Component Value Date    WBC 4 4 06/20/2019    HGB 15 6 06/20/2019    HCT 45 9 06/20/2019    MCV 90 06/20/2019     06/20/2019     Lab Results   Component Value Date    CALCIUM 9 8 07/31/2017     07/31/2017    K 4 6 09/13/2018    CO2 25 09/13/2018     09/13/2018    BUN 14 09/13/2018    CREATININE 1 05 09/13/2018     No results found for: IGE  Lab Results   Component Value Date    ALT 15 09/13/2018    AST 21 09/13/2018    ALKPHOS 63 07/31/2017    BILITOT 0 6 07/31/2017       Imaging and other studies: I have personally reviewed pertinent reports     and I have personally reviewed pertinent films in PACS  CXR 5/10/19 - normal cardiomediastinal silhouette, no focal infiltrates or effusions, mildly prominent anterior/posterior clear spaces with preserved diaphragmatic contours  Pulmonary function testin/4/19 - Ratio 89%  FVC 4 10L (84%), FEV1 3 65L (89%) - normal spirometry but noted shortened expiratory time of 1 8 seconds which will over estimate restriction and under estimate obstruction    19 - 6MWT on RA - total walk distance 336 meters, SpO2 98%, 99% max, 98% conclusion, initial HR 90bpm, maximal HR 91bpm    EKG, Pathology, and Other Studies: I have personally reviewed pertinent reports  TTE 2017 EF 65%, trace MR, PASP in normal range, normal RV size and function    24hr Holter 2017 - Normal, remained SR with no PVCs, rare PACs, no SVT, no significant pauses    EGD 2019 - Normal esophagus, stomach and duodenum     Quincy Dawson DO, [facility]

## 2019-09-03 NOTE — PROGRESS NOTES
Daily Note     Today's date: 9/3/2019  Patient name: Luz Webb  : 1995  MRN: [id number]  Referring provider: Jocelyn Wick DO  Dx:   Encounter Diagnosis     ICD-10-CM    1  Chronic neck pain M54 2     G89 29    2  Chest pain, unspecified type R07 9                   Subjective: Pt reports that he feels okay today but continues to have discomfort near his SCM region  Objective: See treatment diary below      Assessment: Tolerated treatment well; pt denies having pain throughout session  He continues to require vcs on proper sequencing with exercises  Patient demonstrated fatigue post treatment and would benefit from continued PT      Plan: Continue per plan of care        Precautions: standard     HEP: convergence, Smooth pursuits all directions    Specialty Daily Treatment Diary       Manual 8/27 8/28 9/3     TPR/STM cervical SMF SMF SMF     PA c/s jt mobs SMF SMF SMF     Rib jt mobs; clavicle inferior jt mob SMF SMF SMF     Neck stretching SMF SMF SMF     IASTM SMF SMF SMF     Exercise Diary         UBE 6 min retro 6 min retro 6 mins retro      Upper trap stretch 20 sec x 3 20 sec x 3 20 sec x 3     Levator scap stretch 20 sec x 3  20 sec x 3 20 sec x 3     SCM stretch 20 sec x 3 20 sec x 3 20 sec x 3     Chin tucks (supine) Supine: 20x Supine: 2x15 Supine: 20x     Isometrics c/s HEP       L Rib jt mob 2x10 2x10 NV     Scap Retraction Light Blue: 30x Light Blue: 30x Light Blue 30x     B S' Ext Light Blue: 30x Light blue: 30x Light Blue: 30x     Scaption/ Abduction 2# 3x10 3# 3x10 3# 3x10     Wall Walks OTB to fatigue OTB to fatigue OTB to fatigue      Flex/Ext Combo OTB 2 rounds of each to fatigue OTB 2 rounds to each to fatigue GTB 2 rounds to each to fatigue     Pec stretch 30 sec x 3  30 sec x 3 30 sec x 3     Wall Pushup 30x 30x 30x     Cervical Traction    NV     SNAGs         Prone Y on PB 1# 3x10 c chin tuck 1# 3x10 c chin tuck 1# 3x10 c chin tuck      Prone T on PB 0# 3x10 c chin tuck 0# 3x10 c chin tuck 0# 2x10 c chin tuck     Prone I on PB 2# 3x10 c chin tuck 2# 3x10 c chin tuck 2# 3x10 c chin tuck      Foam Roller 2 min 2 min  2 min      Saccades; VOR   5 mins      Modalities        MH 10 min  10 min 10 min      CP          Skin checks performed pre and post application and intact

## 2019-09-04 ENCOUNTER — OFFICE VISIT (OUTPATIENT)
Dept: PHYSICAL THERAPY | Facility: CLINIC | Age: 24
End: 2019-09-04
Payer: COMMERCIAL

## 2019-09-04 ENCOUNTER — OFFICE VISIT (OUTPATIENT)
Dept: PULMONOLOGY | Facility: HOSPITAL | Age: 24
End: 2019-09-04
Payer: COMMERCIAL

## 2019-09-04 VITALS
TEMPERATURE: 98.3 F | OXYGEN SATURATION: 98 % | SYSTOLIC BLOOD PRESSURE: 108 MMHG | BODY MASS INDEX: 21.69 KG/M2 | HEIGHT: 66 IN | DIASTOLIC BLOOD PRESSURE: 76 MMHG | WEIGHT: 135 LBS | HEART RATE: 87 BPM

## 2019-09-04 DIAGNOSIS — R06.02 SHORTNESS OF BREATH: Primary | ICD-10-CM

## 2019-09-04 DIAGNOSIS — R07.9 CHEST PAIN, UNSPECIFIED TYPE: ICD-10-CM

## 2019-09-04 DIAGNOSIS — G89.29 CHRONIC NECK PAIN: Primary | ICD-10-CM

## 2019-09-04 DIAGNOSIS — R53.82 CHRONIC FATIGUE: ICD-10-CM

## 2019-09-04 DIAGNOSIS — R06.02 SOB (SHORTNESS OF BREATH): ICD-10-CM

## 2019-09-04 DIAGNOSIS — M54.2 CHRONIC NECK PAIN: Primary | ICD-10-CM

## 2019-09-04 DIAGNOSIS — IMO0002 CHRONIC MIGRAINE: ICD-10-CM

## 2019-09-04 PROCEDURE — 94010 BREATHING CAPACITY TEST: CPT

## 2019-09-04 PROCEDURE — 97112 NEUROMUSCULAR REEDUCATION: CPT | Performed by: PHYSICAL THERAPIST

## 2019-09-04 PROCEDURE — 99244 OFF/OP CNSLTJ NEW/EST MOD 40: CPT | Performed by: INTERNAL MEDICINE

## 2019-09-04 PROCEDURE — 97110 THERAPEUTIC EXERCISES: CPT | Performed by: PHYSICAL THERAPIST

## 2019-09-04 PROCEDURE — 94618 PULMONARY STRESS TESTING: CPT

## 2019-09-04 PROCEDURE — 97010 HOT OR COLD PACKS THERAPY: CPT | Performed by: PHYSICAL THERAPIST

## 2019-09-04 PROCEDURE — 97012 MECHANICAL TRACTION THERAPY: CPT | Performed by: PHYSICAL THERAPIST

## 2019-09-04 RX ORDER — ALBUTEROL SULFATE 90 UG/1
2 AEROSOL, METERED RESPIRATORY (INHALATION) EVERY 6 HOURS PRN
Qty: 18 G | Refills: 1 | Status: SHIPPED | OUTPATIENT
Start: 2019-09-04 | End: 2020-03-04 | Stop reason: ALTCHOICE

## 2019-09-04 NOTE — PATIENT INSTRUCTIONS
· Start albuterol inhaler 1-2 puffs every 4-6 hours as needed for shortness of breath  · Get breathing tests and methacholine tests completed

## 2019-09-04 NOTE — PROGRESS NOTES
Daily Note     Today's date: 2019  Patient name: Kenny Ferreira  : 1995  MRN: [id number]  Referring provider: Oxana aWde DO  Dx:   Encounter Diagnosis     ICD-10-CM    1  Chronic neck pain M54 2     G89 29    2  Chest pain, unspecified type R07 9                   Subjective: Pt reports that he feels better today than he did last week  Objective: See treatment diary below      Assessment: Tolerated treatment well; initiated POC with UBE  He requires occasional verbal cues to proper sequencing with exercises  Therapist discussed cervical traction with patient and patient agreed to try  Therapist thoroughly educated pt on proper usage of manual cervical traction as well as proper responses to traction  Encouraged pt to continue to use modalities at home  Patient demonstrated fatigue post treatment and would benefit from continued PT      Plan: Continue per plan of care        Precautions: standard     HEP: convergence, Smooth pursuits all directions    Specialty Daily Treatment Diary       Manual 8/27 8/28 9/3 9/4    TPR/STM cervical SMF SMF SMF NV    PA c/s jt mobs SMF SMF SMF NV    Rib jt mobs; clavicle inferior jt mob SMF SMF SMF NV    Neck stretching SMF SMF SMF NV    IASTM SMF SMF SMF NV    Exercise Diary         UBE 6 min retro 6 min retro 6 mins retro  6 min retro     Upper trap stretch 20 sec x 3 20 sec x 3 20 sec x 3 20 sec x 3    Levator scap stretch 20 sec x 3  20 sec x 3 20 sec x 3 20 sec x 3     SCM stretch 20 sec x 3 20 sec x 3 20 sec x 3 20 sec x 3    Planks (prone; sidelying)    20 sec x 3; 15 sec x 3    Sidelying thoracic rotations    NV    L Rib jt mob 2x10 2x10 NV     Scap Retraction Light Blue: 30x Light Blue: 30x Light Blue 30x Light Blue: 30    B S' Ext Light Blue: 30x Light blue: 30x Light Blue: 30x Light Blue: 30    Scaption/ Abduction 2# 3x10 3# 3x10 3# 3x10 3# 3x10    Wall Walks OTB to fatigue OTB to fatigue OTB to fatigue  OTB to fatigue    Flex/Ext Combo OTB 2 rounds of each to fatigue OTB 2 rounds to each to fatigue GTB 2 rounds to each to fatigue GTB 2 rounds to fatigue    Pec stretch 30 sec x 3  30 sec x 3 30 sec x 3 30 sec x 3     Wall Pushup 30x 30x 30x 30x    Cervical Traction    NV 10 min (30/30 for 15 lbs)    "W" wall slides    10x    Prone Y on PB 1# 3x10 c chin tuck 1# 3x10 c chin tuck 1# 3x10 c chin tuck  NV    Prone T on PB 0# 3x10 c chin tuck 0# 3x10 c chin tuck 0# 2x10 c chin tuck NV    Prone I on PB 2# 3x10 c chin tuck 2# 3x10 c chin tuck 2# 3x10 c chin tuck  NV    Foam Roller 2 min 2 min  2 min  2 min     Body Blade        Modalities        MH 10 min  10 min 10 min  10 min     CP          Skin checks performed pre and post application and intact

## 2019-09-09 ENCOUNTER — OFFICE VISIT (OUTPATIENT)
Dept: PHYSICAL THERAPY | Facility: CLINIC | Age: 24
End: 2019-09-09
Payer: COMMERCIAL

## 2019-09-09 DIAGNOSIS — M54.2 CHRONIC NECK PAIN: Primary | ICD-10-CM

## 2019-09-09 DIAGNOSIS — R07.9 CHEST PAIN, UNSPECIFIED TYPE: ICD-10-CM

## 2019-09-09 DIAGNOSIS — G89.29 CHRONIC NECK PAIN: Primary | ICD-10-CM

## 2019-09-09 PROCEDURE — 97140 MANUAL THERAPY 1/> REGIONS: CPT | Performed by: PHYSICAL THERAPIST

## 2019-09-09 PROCEDURE — 97112 NEUROMUSCULAR REEDUCATION: CPT | Performed by: PHYSICAL THERAPIST

## 2019-09-09 PROCEDURE — 97010 HOT OR COLD PACKS THERAPY: CPT | Performed by: PHYSICAL THERAPIST

## 2019-09-09 NOTE — PROGRESS NOTES
Daily Note     Today's date: 2019  Patient name: Chaparrita Kelly  : 1995  MRN: [id number]  Referring provider: Campos Fernandez DO  Dx:   Encounter Diagnosis     ICD-10-CM    1  Chronic neck pain M54 2     G89 29    2  Chest pain, unspecified type R07 9                   Subjective: Pt reports that he feels okay today  He reports having some head pain  He reports that he saw the pulmonologist who reports that he might not be taking in deep enough breaths but otherwise his lungs are clear  He is scheduled to have more testing next week  Objective: See treatment diary below      Assessment: Tolerated treatment well; initiated POC with UBE f/b cervical retraction hold with walk outs  He reports having some head pain after activity and thus will decrease resistance at next visit  Discussed wearing hats for desensitizing activities to slowly progress bands around his head  Patient demonstrated fatigue post treatment and would benefit from continued PT      Plan: Continue per plan of care        Precautions: standard     HEP: convergence, Smooth pursuits all directions    Specialty Daily Treatment Diary       Manual 8/27 8/28 9/3 9/4 9/9   TPR/STM cervical SMF SMF SMF NV SMF   PA c/s jt mobs SMF SMF SMF NV SMF   Rib jt mobs; clavicle inferior jt mob SMF SMF SMF NV SMF   Neck stretching SMF SMF SMF NV SMF   IASTM SMF SMF SMF NV SMF   Exercise Diary         UBE 6 min retro 6 min retro 6 mins retro  6 min retro  6 min retro   Upper trap stretch 20 sec x 3 20 sec x 3 20 sec x 3 20 sec x 3 20 sec x 3   Levator scap stretch 20 sec x 3  20 sec x 3 20 sec x 3 20 sec x 3  20 sec x 3   SCM stretch 20 sec x 3 20 sec x 3 20 sec x 3 20 sec x 3 20 sec x 3   Planks (prone; sidelying)    20 sec x 3; 15 sec x 3 20 sec x 3; 15 sec x 3   Sidelying thoracic rotations    NV NV   L Rib jt mob 2x10 2x10 NV     Scap Retraction Light Blue: 30x Light Blue: 30x Light Blue 30x Light Blue: 30 Light Blue: 30   B S' Ext Light Blue: 30x Light blue: 30x Light Blue: 30x Light Blue: 30 Light Blue: 30   Scaption/ Abduction 2# 3x10 3# 3x10 3# 3x10 3# 3x10 3# 3x10   Wall Walks OTB to fatigue OTB to fatigue OTB to fatigue  OTB to fatigue GTB to fatigue   Flex/Ext Combo OTB 2 rounds of each to fatigue OTB 2 rounds to each to fatigue GTB 2 rounds to each to fatigue GTB 2 rounds to fatigue GTB 2 rounds to fatigue   Pec stretch 30 sec x 3  30 sec x 3 30 sec x 3 30 sec x 3  30 sec x 3   Wall Pushup 30x 30x 30x 30x 30x   3 way walk outs c cervical retraction    BTB 5x RTB   "W" wall slides    10x 10x   Prone Y on PB 1# 3x10 c chin tuck 1# 3x10 c chin tuck 1# 3x10 c chin tuck  NV 1# 3x10 c chin tuck   Prone T on PB 0# 3x10 c chin tuck 0# 3x10 c chin tuck 0# 2x10 c chin tuck NV 1# 3x10 c chin tucks   Prone I on PB 2# 3x10 c chin tuck 2# 3x10 c chin tuck 2# 3x10 c chin tuck  NV 2# 3x10 c chin tucks    Foam Roller 2 min 2 min  2 min  2 min  2 mins   Body Blade        Modalities        MH 10 min  10 min 10 min  10 min  10 min    CP          Skin checks performed pre and post application and intact

## 2019-09-11 ENCOUNTER — OFFICE VISIT (OUTPATIENT)
Dept: PHYSICAL THERAPY | Facility: CLINIC | Age: 24
End: 2019-09-11
Payer: COMMERCIAL

## 2019-09-11 DIAGNOSIS — M54.2 CHRONIC NECK PAIN: Primary | ICD-10-CM

## 2019-09-11 DIAGNOSIS — G89.29 CHRONIC NECK PAIN: Primary | ICD-10-CM

## 2019-09-11 DIAGNOSIS — R07.9 CHEST PAIN, UNSPECIFIED TYPE: ICD-10-CM

## 2019-09-11 PROCEDURE — 97010 HOT OR COLD PACKS THERAPY: CPT | Performed by: PHYSICAL THERAPIST

## 2019-09-11 PROCEDURE — 97112 NEUROMUSCULAR REEDUCATION: CPT | Performed by: PHYSICAL THERAPIST

## 2019-09-11 PROCEDURE — 97110 THERAPEUTIC EXERCISES: CPT | Performed by: PHYSICAL THERAPIST

## 2019-09-11 PROCEDURE — 97140 MANUAL THERAPY 1/> REGIONS: CPT | Performed by: PHYSICAL THERAPIST

## 2019-09-11 NOTE — PROGRESS NOTES
Daily Note     Today's date: 2019  Patient name: Conrad Jones  : 1995  MRN: [id number]  Referring provider: Anthony Diaz DO  Dx:   Encounter Diagnosis     ICD-10-CM    1  Chronic neck pain M54 2     G89 29    2  Chest pain, unspecified type R07 9                   Subjective: Pt reports that he does not feel great today; he has a lot of chest pain and has a headache  Objective: See treatment diary below      Assessment: Tolerated treatment well; he requires vcs on proper sequencing with exercises  He denies having increased sxs with exercises  He reports having less sxs post MT  Encouraged pt to use heat and perform self TPR at home  Patient demonstrated fatigue post treatment and would benefit from continued PT      Plan: Continue per plan of care  Resumed cervical walk outs next visit if appropriate        Precautions: standard     HEP: convergence, Smooth pursuits all directions    Specialty Daily Treatment Diary       Manual    TPR/STM cervical SMF    SMF   PA c/s jt mobs SMF    SMF   Rib jt mobs; clavicle inferior jt mob SMF    SMF   Neck stretching SMF    SMF   IASTM L SCM/scalenes/UT SMF (supine)    SMF   Exercise Diary         UBE 6 min retro    6 min retro   Upper trap stretch 20 sec x 3    20 sec x 3   Levator scap stretch 20 sec x 3    20 sec x 3   SCM stretch 20 sec x 3    20 sec x 3   Planks (prone; sidelying) 20 sec x 3; 15 sec x 3    20 sec x 3; 15 sec x 3   Sidelying thoracic rotations 5x ea     NV   L Rib jt mob        Scap Retraction Light Blue: 30    Light Blue: 30   B S' Ext Light Blue: 30x     Light Blue: 30   Scaption/ Abduction 3# 3x10     3# 3x10   Wall Walks GTB to fatigue    GTB to fatigue   Flex/Ext Combo GTB 2 rounds to fatigue     GTB 2 rounds to fatigue   Pec stretch 30 sec x 3     30 sec x 3   Wall Pushup 30x    30x   3 way walk outs c cervical retraction NV perform RTB    RTB   "W" wall slides 10x    10x   Prone Y on PB 1# 3x10 c chin tuck    1# 3x10 c chin tuck   Prone T on PB 1# 3x10 c chin tucks    1# 3x10 c chin tucks   Prone I on PB 2# 3x10 c chin tucks     2# 3x10 c chin tucks    Foam Roller 2 mins     2 mins   Body Blade        Modalities        MH 10 min    10 min    CP          Skin checks performed pre and post application and intact

## 2019-09-12 ENCOUNTER — OFFICE VISIT (OUTPATIENT)
Dept: NEUROLOGY | Facility: CLINIC | Age: 24
End: 2019-09-12
Payer: COMMERCIAL

## 2019-09-12 VITALS
SYSTOLIC BLOOD PRESSURE: 124 MMHG | WEIGHT: 135 LBS | BODY MASS INDEX: 21.69 KG/M2 | HEIGHT: 66 IN | DIASTOLIC BLOOD PRESSURE: 76 MMHG

## 2019-09-12 DIAGNOSIS — G44.099 TRIGEMINAL AUTONOMIC CEPHALGIAS: ICD-10-CM

## 2019-09-12 DIAGNOSIS — G43.719 INTRACTABLE CHRONIC MIGRAINE WITHOUT AURA AND WITHOUT STATUS MIGRAINOSUS: Primary | ICD-10-CM

## 2019-09-12 DIAGNOSIS — G24.3 CERVICAL DYSTONIA: ICD-10-CM

## 2019-09-12 PROCEDURE — 96372 THER/PROPH/DIAG INJ SC/IM: CPT | Performed by: PSYCHIATRY & NEUROLOGY

## 2019-09-12 PROCEDURE — 99214 OFFICE O/P EST MOD 30 MIN: CPT | Performed by: PSYCHIATRY & NEUROLOGY

## 2019-09-12 RX ORDER — KETOROLAC TROMETHAMINE 30 MG/ML
60 INJECTION, SOLUTION INTRAMUSCULAR; INTRAVENOUS ONCE
Status: COMPLETED | OUTPATIENT
Start: 2019-09-12 | End: 2019-09-12

## 2019-09-12 RX ORDER — VERAPAMIL HYDROCHLORIDE 40 MG/1
TABLET ORAL
Qty: 60 TABLET | Refills: 1 | Status: SHIPPED | OUTPATIENT
Start: 2019-09-12 | End: 2020-03-04 | Stop reason: ALTCHOICE

## 2019-09-12 RX ORDER — RIZATRIPTAN BENZOATE 10 MG/1
TABLET, ORALLY DISINTEGRATING ORAL
Qty: 9 TABLET | Refills: 1 | Status: SHIPPED | OUTPATIENT
Start: 2019-09-12 | End: 2020-03-04 | Stop reason: ALTCHOICE

## 2019-09-12 RX ADMIN — KETOROLAC TROMETHAMINE 60 MG: 30 INJECTION, SOLUTION INTRAMUSCULAR; INTRAVENOUS at 11:42

## 2019-09-12 NOTE — PATIENT INSTRUCTIONS
Neurology    If any chest pain with rizatriptan (Maxalt) stop it immediately   Do not use this with zomig, however please let me know if you know if your insurance has approved this or not  If light headed or severe constipation with verapamil stop and call us and let us know will try lamictal- has off label indication for refractory headaches

## 2019-09-12 NOTE — PROGRESS NOTES
Patient ID: Aylin Garcia  is a 24 y o  male  Assessment/Plan:    No problem-specific Assessment & Plan notes found for this encounter  Diagnoses and all orders for this visit:    Trigeminal autonomic cephalgias-- concern for cluster type of headache a given duration and left eye ptosis with severe periorbital pain however he did not respond oxygen therapy  He is notably more photophobia and nausea and better with sleep his concern for mixed cluster-migraine syndrome  He will try Maxalt  Insurance likely did not approve Zomig but he will check on this  Verapamil for headache prevention  If this does not helpful send an Lamictal   If this does not help will send him to [facility] for further guidance as he has tried and failed several other treatment options  He will continue Dysport for his dystonia- recommended at least a total of 2-3 injections to see if this helps, may need increased dosing  Insurance did not approve Botox  Intractable chronic migraine without aura and without status migrainosus with   -     verapamil (CALAN) 40 mg tablet; Take half tab qhs x 1 week, then 1 tab qhs x 1 week, then half tab AM and 1 tab nightly, then 1 tab BID  -     rizatriptan (MAXALT-MLT) 10 MG disintegrating tablet; Take 1 tab at migraine onset, can repeat once in 2 hours  Max 2 tabs in 24 hours  Max 2 days a week  -     ketorolac (TORADOL) 60 mg/2 mL IM injection 60 mg  Discussed potential medication adverse effects  If these do not helpful send in indomethacin  Cervical dystonia    Discussed lifestyle changes including adequate sleep, staying hydrated, healthy diet, not skipping meals to further help with headaches  We also discussed a headache avoidance diet  Will follow up in 3-4 months time  Patient knows to call and tell me verapamil not helpful in 4-6 weeks, then will stop this and send Lamictal     Subjective:    HPI    Mr   Sadia Dinero is seen in follow up for daily persistent headache, with likely cervical dystonia component  He has tried and failed numerous medications  He most recently received 1 set of Dysport injections for his dystonia by Dr Sandeep Huggins with no benefit per him  Denies any adverse effects with this  He has a follow-up appointment coming up  States over the last year and have his headaches have gradually worsened  No other head injury or neck injury  He continues works 12 hours a day  He states he is trying to get into college  Denies any significant depression or anxiety or insomnia  He continues to have periodic chest pain and has had cardiac workup with nothing cardiac per him states he is now seeing pulmonology  Continues to have daily 4 to 5/10 headaches and approximately once a week severe headaches with now dosing photophobia, nausea, left eye ptosis lasting 1-2 hours now resolved with sleep and dark room  He denies any other cognitive changes or vision changes  Has had an eye exam with an ophthalmologist and cleared per them  This MRI brain was unremarkable  MRI C-spine unremarkable  Head extensive rheumatologic workup which was unremarkable  Medications tried and failed:  Since then heas tried and failed various medications including tizanidine, nortriptyline, gabapentin- initially helped but wore off- no a/e, indocin as well, right onb with some benefit transient, duloxetine cymbalta, toradol, baclofen, trigger points with minimal to no benefit, robaxin with some benefit but significant sedation thus takes it once nightly  Tried Aimovig one injection and states did not receive another one  States the one injection was not helpful  Prednisone with no benefit, effexor not helpful, zomig, lyrica, Aimovig, Dysport (insurance did not approve Botox)    States when he has a severe headache now he has left eye droop and light headed  States this lasts about one to two hours  States if he relaxes it starts to dissipate   States it is severe, left periorbital   Frequency: couple of times a week  Photophobia, nausea, worse with activity better with sleep  Severity 9 to 10/10 states cannot do anything and just has to let it pass  States oxygen does not help but helps his periodic chest pain and SOB   States he never received this sumatriptan with his pharmacy and will check on this  He believes his insurance did not approve this  I sent Maxalt today  Once again denies having any type of headaches prior to his head injury 2017  MR brain and CTA H/N wnl  Other headaches:     Description: dull throbbing pain with muscle spasms in the back of the neck- on the right side  Denies photophobia, phonophobia, nausea, emesis  Severity: 4-5/10     The following portions of the patient's history were reviewed and updated as appropriate: allergies, current medications, past family history, past medical history, past social history, past surgical history and problem list and ROS         Objective:    Blood pressure 124/76, height 5' 6" (1 676 m), weight 61 2 kg (135 lb)  Physical Exam   Constitutional: He appears well-developed and well-nourished  HENT:   Head: Normocephalic and atraumatic  Eyes: Pupils are equal, round, and reactive to light  Conjunctivae are normal    Neck: Normal range of motion  Neck supple  Cardiovascular: Normal rate and regular rhythm  Pulmonary/Chest: Effort normal    Musculoskeletal: Normal range of motion  Neurological: He is alert  He has normal strength and normal reflexes  Nursing note and vitals reviewed  Neurological Exam  Mental Status  Alert  Oriented to person, place, time and situation  Recent and remote memory are intact  no dysarthria present  Language is fluent with no aphasia  Attention and concentration are normal  Fund of knowledge is appropriate for level of education  Cranial Nerves  CN II: Visual fields full to confrontation  Right funduscopic exam: not visualized   Left funduscopic exam: not visualized  CN III, IV, VI: Extraocular movements intact bilaterally  Pupils equal round and reactive to light bilaterally  CN V: Facial sensation is normal   CN VII: Full and symmetric facial movement  CN VIII: Hearing is normal   CN IX, X: Palate elevates symmetrically  Normal gag reflex  CN XI: Shoulder shrug strength is normal   CN XII: Tongue midline without atrophy or fasciculations  Improved ROM neck  Motor   Normal muscle tone  Strength is 5/5 throughout all four extremities  Sensory  Sensation is intact to light touch, pinprick, vibration and proprioception in all four extremities  Light touch is normal in upper and lower extremities  Temperature is normal in upper and lower extremities  Vibration is normal in upper and lower extremities  No right-sided hemispatial neglect  No left-sided hemispatial neglect  Reflexes  Deep tendon reflexes are 2+ and symmetric in all four extremities with downgoing toes bilaterally  Coordination  Right: Finger-to-nose normal  Rapid alternating movement normal   Left: Finger-to-nose normal  Rapid alternating movement normal     Gait  Casual gait is normal including stance, stride, and arm swing  Normal tandem gait  Romberg is absent  ROS:    Review of Systems   Constitutional: Positive for fatigue  Negative for appetite change and fever  HENT: Negative  Negative for hearing loss, tinnitus, trouble swallowing and voice change  Eyes: Negative  Negative for photophobia and pain  Respiratory: Negative  Negative for shortness of breath  Cardiovascular: Positive for chest pain  Negative for palpitations  Gastrointestinal: Negative  Negative for nausea and vomiting  Endocrine: Negative  Negative for cold intolerance and heat intolerance  Genitourinary: Negative  Negative for dysuria, frequency and urgency  Musculoskeletal: Positive for neck pain  Negative for myalgias  Skin: Negative  Negative for rash     Neurological: Positive for dizziness, weakness, light-headedness and headaches  Negative for tremors, seizures, syncope, facial asymmetry, speech difficulty and numbness  Hematological: Negative  Does not bruise/bleed easily  Psychiatric/Behavioral: Positive for sleep disturbance (trouble falling and staying asleep )  Negative for confusion and hallucinations

## 2019-09-16 ENCOUNTER — OFFICE VISIT (OUTPATIENT)
Dept: PHYSICAL THERAPY | Facility: CLINIC | Age: 24
End: 2019-09-16
Payer: COMMERCIAL

## 2019-09-16 DIAGNOSIS — M54.2 CHRONIC NECK PAIN: Primary | ICD-10-CM

## 2019-09-16 DIAGNOSIS — R07.9 CHEST PAIN, UNSPECIFIED TYPE: ICD-10-CM

## 2019-09-16 DIAGNOSIS — G89.29 CHRONIC NECK PAIN: Primary | ICD-10-CM

## 2019-09-16 PROCEDURE — 97140 MANUAL THERAPY 1/> REGIONS: CPT

## 2019-09-16 PROCEDURE — 97110 THERAPEUTIC EXERCISES: CPT

## 2019-09-16 PROCEDURE — 97112 NEUROMUSCULAR REEDUCATION: CPT

## 2019-09-16 NOTE — PROGRESS NOTES
Daily Note     Today's date: 2019  Patient name: Daphne Mehta  : 1995  MRN: [id number]  Referring provider: Deniece Gitelman, DO  Dx:   Encounter Diagnosis     ICD-10-CM    1  Chronic neck pain M54 2     G89 29    2  Chest pain, unspecified type R07 9        Start Time: 818  Stop Time: 09  Total time in clinic (min): 85 minutes    Subjective: Pt reports feeling ok today with minimal symptoms  States he had pain in the usual spots in his neck, head, and chest over the weekend, mostly on Saturday, that would come and go  Objective: See treatment diary below      Assessment: Tolerated treatment well  Was able to perform all exercise with no significant increase of symptoms, requiring occasional verbal cues for proper form/intensisty throughout session  Slight pain in mid back reported during prone Y on pball, that resolved with short rest and correction of form with this movement  Tolerated 3 way walk outs well  Slight to moderate soft tissue restrictions noted along cervical spine musculature during STM  Patient would benefit from continued PT to further reduce pain and frequency of symptoms  Plan: Continue per plan of care  Progress as able       Precautions: standard     HEP: convergence, Smooth pursuits all directions    Specialty Daily Treatment Diary       Manual    TPR/STM cervical SMF AFB   SMF   PA c/s jt mobs SMF np   SMF   Rib jt mobs; clavicle inferior jt mob SMF np   SMF   Neck stretching SMF AFB   SMF   IASTM L SCM/scalenes/UT SMF (supine) AFB   SMF   Exercise Diary         UBE 6 min retro 6 min retro   6 min retro   Upper trap stretch 20 sec x 3 20 sec x 3   20 sec x 3   Levator scap stretch 20 sec x 3 20 sec x 3   20 sec x 3   SCM stretch 20 sec x 3 nv   20 sec x 3   Planks (prone; sidelying) 20 sec x 3; 15 sec x 3 nv   20 sec x 3; 15 sec x 3   Sidelying thoracic rotations 5x ea  10"x5 ea   NV   L Rib jt mob        Scap Retraction Light Blue: 30 Light Blue  30x   Light Blue: 30   B S' Ext Light Blue: 30x  Light Blue  30x   Light Blue: 30   Scaption/ Abduction 3# 3x10  3# 3x10    3# 3x10   Wall Walks GTB to fatigue GTB to fatigue   GTB to fatigue   Flex/Ext Combo GTB 2 rounds to fatigue  nv   GTB 2 rounds to fatigue   Pec stretch 30 sec x 3  30"x3   30 sec x 3   Wall Pushup 30x 3x10   30x   3 way walk outs c cervical retraction NV perform RTB RTB  5x ea   RTB   "W" wall slides 10x 10x   10x   Prone Y on PB 1# 3x10 c chin tuck 1# 3x10 c chin tuck   1# 3x10 c chin tuck   Prone T on PB 1# 3x10 c chin tucks 1# 3x10 c chin tucks   1# 3x10 c chin tucks   Prone I on PB 2# 3x10 c chin tucks  2# 3x10 c chin tucks    2# 3x10 c chin tucks    Foam Roller 2 mins  nv   2 mins   Body Blade        Modalities        MH 10 min 10 min   10 min    CP          Skin checks performed pre and post application and intact

## 2019-09-19 ENCOUNTER — OFFICE VISIT (OUTPATIENT)
Dept: PHYSICAL THERAPY | Facility: CLINIC | Age: 24
End: 2019-09-19
Payer: COMMERCIAL

## 2019-09-19 ENCOUNTER — HOSPITAL ENCOUNTER (OUTPATIENT)
Dept: PULMONOLOGY | Facility: HOSPITAL | Age: 24
Discharge: HOME | End: 2019-09-19
Attending: INTERNAL MEDICINE
Payer: COMMERCIAL

## 2019-09-19 DIAGNOSIS — R07.9 CHEST PAIN, UNSPECIFIED TYPE: ICD-10-CM

## 2019-09-19 DIAGNOSIS — G89.29 CHRONIC NECK PAIN: Primary | ICD-10-CM

## 2019-09-19 DIAGNOSIS — R06.02 SHORTNESS OF BREATH: ICD-10-CM

## 2019-09-19 DIAGNOSIS — M54.2 CHRONIC NECK PAIN: Primary | ICD-10-CM

## 2019-09-19 PROCEDURE — 94729 DIFFUSING CAPACITY: CPT

## 2019-09-19 PROCEDURE — 94726 PLETHYSMOGRAPHY LUNG VOLUMES: CPT | Performed by: INTERNAL MEDICINE

## 2019-09-19 PROCEDURE — 94760 N-INVAS EAR/PLS OXIMETRY 1: CPT

## 2019-09-19 PROCEDURE — 94726 PLETHYSMOGRAPHY LUNG VOLUMES: CPT

## 2019-09-19 PROCEDURE — 97112 NEUROMUSCULAR REEDUCATION: CPT

## 2019-09-19 PROCEDURE — 97110 THERAPEUTIC EXERCISES: CPT

## 2019-09-19 PROCEDURE — 94070 EVALUATION OF WHEEZING: CPT | Performed by: INTERNAL MEDICINE

## 2019-09-19 PROCEDURE — 94070 EVALUATION OF WHEEZING: CPT

## 2019-09-19 PROCEDURE — 97140 MANUAL THERAPY 1/> REGIONS: CPT

## 2019-09-19 PROCEDURE — 94729 DIFFUSING CAPACITY: CPT | Performed by: INTERNAL MEDICINE

## 2019-09-19 RX ORDER — ALBUTEROL SULFATE 2.5 MG/3ML
2.5 SOLUTION RESPIRATORY (INHALATION) ONCE AS NEEDED
Status: COMPLETED | OUTPATIENT
Start: 2019-09-19 | End: 2019-09-19

## 2019-09-19 RX ADMIN — ALBUTEROL SULFATE 2.5 MG: 2.5 SOLUTION RESPIRATORY (INHALATION) at 14:00

## 2019-09-19 RX ADMIN — METHACHOLINE CHLORIDE 5 BREATH: 100 POWDER, FOR SOLUTION RESPIRATORY (INHALATION) at 13:30

## 2019-09-19 NOTE — PROGRESS NOTES
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  States his symptoms have been about the same frequency       Objective: See treatment diary below      Assessment: Tolerated treatment well  Was able to perform all exercise with no significant increase in symptoms  Slight pain reported in R forehead during STM to R cervical musculature, that began to resolve with less pressure applied to these areas  Moderate tension and increase of tone noted in both SCM today, L>R  L SCM very sensitive to pressure with head rotated to R and muscle put on tension  Felt better and responded well to IASTM when neck and head in neutral position  Patient would benefit from continued PT to further reduce tension of cervical musculature and reduce frequency of symptoms  Plan: Continue per plan of care        Precautions: standard     HEP: convergence, Smooth pursuits all directions    Specialty Daily Treatment Diary       Manual    TPR/STM cervical SMF AFB AFB  SMF   PA c/s jt mobs SMF np SMF  SMF   Rib jt mobs; clavicle inferior jt mob SMF np SMF  SMF   Neck stretching SMF AFB AFB  SMF   IASTM L SCM/scalenes/UT SMF (supine) AFB AFB  (supine)  SMF   Exercise Diary         UBE 6 min retro 6 min retro 6 min retro  6 min retro   Upper trap stretch 20 sec x 3 20 sec x 3 30"x3  20 sec x 3   Levator scap stretch 20 sec x 3 20 sec x 3 30"x3  20 sec x 3   SCM stretch 20 sec x 3 nv 30"x3  20 sec x 3   Planks (prone; sidelying) 20 sec x 3; 15 sec x 3 nv nv  20 sec x 3; 15 sec x 3 Sidelying thoracic rotations 5x ea  10"x5 ea 10"x5 ea  NV   L Rib jt mob        Scap Retraction Light Blue: 30 Light Blue  30x Light Blue  30x  Light Blue: 30   B S' Ext Light Blue: 30x  Light Blue  30x Light Blue  30x  Light Blue: 30   Scaption/ Abduction 3# 3x10  3# 3x10  nv  3# 3x10   Wall Walks GTB to fatigue GTB to fatigue GTB to fatigue  GTB to fatigue   Flex/Ext Combo GTB 2 rounds to fatigue  nv GTB 2 rounds to fatigue   GTB 2 rounds to fatigue   Pec stretch 30 sec x 3  30"x3 30"x3  30 sec x 3   Wall Pushup 30x 3x10 3x10  Off counter  30x   3 way walk outs c cervical retraction NV perform RTB RTB  5x ea nv  RTB   "W" wall slides 10x 10x 12x  10x   Prone Y on PB 1# 3x10 c chin tuck 1# 3x10 c chin tuck 1# 3x10 c chin tuck  1# 3x10 c chin tuck   Prone T on PB 1# 3x10 c chin tucks 1# 3x10 c chin tucks 1# 3x10 c chin tucks  1# 3x10 c chin tucks   Prone I on PB 2# 3x10 c chin tucks  2# 3x10 c chin tucks  2# 3x10 c chin tucks   2# 3x10 c chin tucks    Foam Roller 2 mins  nv nv  2 mins   Body Blade        Modalities        MH 10 min 10 min def  10 min    CP

## 2019-09-20 ENCOUNTER — OFFICE VISIT (OUTPATIENT)
Dept: FAMILY MEDICINE CLINIC | Facility: HOSPITAL | Age: 24
End: 2019-09-20
Payer: COMMERCIAL

## 2019-09-20 VITALS
HEART RATE: 67 BPM | DIASTOLIC BLOOD PRESSURE: 68 MMHG | WEIGHT: 137 LBS | HEIGHT: 66 IN | BODY MASS INDEX: 22.02 KG/M2 | SYSTOLIC BLOOD PRESSURE: 100 MMHG

## 2019-09-20 DIAGNOSIS — F07.81 POSTCONCUSSIVE SYNDROME: ICD-10-CM

## 2019-09-20 DIAGNOSIS — R53.82 CHRONIC FATIGUE: ICD-10-CM

## 2019-09-20 DIAGNOSIS — M54.81 OCCIPITAL NEURALGIA OF RIGHT SIDE: ICD-10-CM

## 2019-09-20 DIAGNOSIS — IMO0002 CHRONIC MIGRAINE: Primary | ICD-10-CM

## 2019-09-20 DIAGNOSIS — K90.41 GLUTEN-SENSITIVE ENTEROPATHY: ICD-10-CM

## 2019-09-20 DIAGNOSIS — L20.82 FLEXURAL ECZEMA: ICD-10-CM

## 2019-09-20 DIAGNOSIS — R06.02 SOB (SHORTNESS OF BREATH): ICD-10-CM

## 2019-09-20 DIAGNOSIS — M79.18 MYOFASCIAL MUSCLE PAIN: ICD-10-CM

## 2019-09-20 PROBLEM — R10.13 EPIGASTRIC PAIN: Status: RESOLVED | Noted: 2019-01-24 | Resolved: 2019-09-20

## 2019-09-20 PROCEDURE — 99214 OFFICE O/P EST MOD 30 MIN: CPT | Performed by: INTERNAL MEDICINE

## 2019-09-20 RX ORDER — MOMETASONE FUROATE 1 MG/G
CREAM TOPICAL DAILY
Qty: 45 G | Refills: 5 | Status: SHIPPED | OUTPATIENT
Start: 2019-09-20 | End: 2021-07-12 | Stop reason: ALTCHOICE

## 2019-09-20 NOTE — ASSESSMENT & PLAN NOTE
Has daily  Headaches- using maxalt for onset of symptoms  now on verapamil low dose for prevention   Had low bp with betablockers trial in past

## 2019-09-20 NOTE — PATIENT INSTRUCTIONS
Gluten-Free Diet   WHAT YOU NEED TO KNOW:   A gluten-free diet is a meal plan that does not contain any gluten  Gluten is a protein found in wheat, rye, and barley  Gluten is found in many breads, pastas, cereals, cakes, pies, and cookies  Some vitamin supplements and medicines may also contain gluten  You need to follow this diet for the rest of your life if you have celiac disease, dermatitis herpetiformis, or non-celiac gluten sensitivity  DISCHARGE INSTRUCTIONS:   Contact your healthcare provider or dietitian if:   · You must take a medicine or vitamin that contains gluten  · Signs and symptoms of your condition get worse  · You are losing weight, without trying  · You have questions or concerns about your condition or care  Follow up with your healthcare provider or dietitian as directed:  Write down your questions so you remember to ask them during your visits  Foods to avoid:  Do not eat foods that contain the following ingredients:  · Barley, barley malt, barley extract, rye, bulgar, semolina, and triticale    · Wheat, wheat bran, wheat germ, and wheat starch     · Wheat varieties such as kamut and spelt    · Bran, couscous, durum, farina, and orzo    · Matzo flour, matzo meal, terrell flour     · Oats that are not labeled as gluten-free, unless your dietitian has allowed you to eat a small amount    · Malt extract and malt flavoring    · Einkorn, emmer, faro, panko, seitan, and udon  Foods you can have:  Choose foods labeled as gluten-free  You may be able to eat gluten-free oats, or you may be able to eat regular oats in small amounts  Ask your dietitian if oats are safe for you to eat   You may eat foods that are made with the following types of grains and starches:   · Corn, potato starch, and potato flour    · Soy, tapioca, and teff    · Rice, rice bran, quinoa, sago, and sorghum    · Amaranth, arrowroot, and buckwheat    · Flours made from nuts, beans, and seeds    · Flax, millet, and james  Examples of gluten-free foods:   · Fresh fruits and vegetables    · Eggs, meat, fish, and poultry    · Dried beans, lentils, and peas    · Beverages such as 100% fruit juice, coffee, tea, cocoa, and soft drinks    · Fats and oils, such as butter, margarine, and vegetable, canola, and olive oils    · Regular, unflavored milk, cottage cheese, most yogurts, and aged cheese such as cheddar cheese    · Cream, sour cream, cream cheese, most ice creams, and sherbets    · Cream of rice, grits, puffed rice, brown rice, and white rice    · Corn tacos and tortillas  Sample menu for 1 day:   · Breakfast:  Soft boiled eggs, gluten-free toast with butter, and milk    · Morning snack:  Gluten-free rice cakes or crackers    · Lunch:  Tuna salad with tomato and lettuce, and an apple     · Afternoon snack:  Carrot sticks    · Dinner:  Broiled chicken, baked potato, green beans, and sorbet with strawberries  Ways to make a gluten-free diet part of your lifestyle:   · Follow up with your healthcare provider or dietitian as directed  It may take time to learn how to properly follow a gluten-free diet  Your dietitian can help you find ways to fit this diet into your lifestyle  · Learn to read food labels  Gluten is found in many foods and drinks  It may not be clear which foods contain gluten  Include a variety of allowed foods so that you can get all the nutrients you need  · Prepare for restaurant meals  Carry a list of items allowed on this diet with you when you are away from home  Go to the restaurant's website or call ahead to find out if the restaurant has gluten-free meals  Tell the  that you cannot eat wheat, rye, or barley  Ask how food is prepared  · Prepare gluten-free foods separately from other foods  Cross-contamination is when foods that contain gluten get mixed in with gluten-free foods   Prevent cross contamination by cleaning counter tops and cutting boards well to remove any crumbs that contain gluten  Wash cooking utensils, colanders, and pans well before you cook gluten-free foods  Buy a separate toaster to use for gluten-free breads  Buy separate jam, jelly, mayonnaise, or peanut butter to use on gluten-free breads to avoid cross contamination  These foods are also available in squeeze containers  · Include naturally gluten-free foods that are high in iron, folate, and vitamin B12  Foods high in iron and vitamin B12 include meat, fish, poultry (chicken and fish), liver, and eggs  Foods high in folate include broccoli, asparagus, orange juice, legumes, peanuts, walnuts, and sunflower seeds  · Ask your healthcare provider if you need a vitamin and mineral supplement  Celiac disease and dermatitis herpetiformis can cause damage to your intestines  This damage can decrease the absorption of certain vitamins and minerals  Also, many gluten-free cereals, pastas, and breads are not fortified with vitamin B and iron  © 2017 [address] [facility] Information is for End User's use only and may not be sold, redistributed or otherwise used for commercial purposes  All illustrations and images included in CareNotes® are the copyrighted property of [employer] , [employer]  or Aleksandar Mercado  The above information is an  only  It is not intended as medical advice for individual conditions or treatments  Talk to your doctor, nurse or pharmacist before following any medical regimen to see if it is safe and effective for you

## 2019-09-20 NOTE — ASSESSMENT & PLAN NOTE
Doing Pt for neck pain issues- now has some improved rom but still has severe pains at times  Working 4 hours shifts - looking to start college courses at [employer]

## 2019-09-20 NOTE — PROGRESS NOTES
Assessment/Plan:             Problem List Items Addressed This Visit        Cardiovascular and Mediastinum    Chronic migraine - Primary     Has daily  Headaches- using maxalt for onset of symptoms  now on verapamil low dose for prevention  Had low bp with betablockers trial in past             Nervous and Auditory    Postconcussive syndrome       Other    Occipital neuralgia of right side     Doing Pt for neck pain issues- now has some improved rom but still has severe pains at times  Working 4 hours shifts - looking to start college courses at [employer]  Myofascial muscle pain    SOB (shortness of breath)     Had pft  Breathing testing with dr soto         Chronic fatigue      Other Visit Diagnoses     Gluten-sensitive enteropathy                Subjective:      Patient ID: Trish Guardian  is a 24 y o  male    1  Bloating- noted it with cheeses  Had prior positive test for celiac- negative egd- to see gi in followup- encouraged to follow gluten free diet  2  Leg rash- with weather changes- in past was dx with eczema- will give rx- on posterior legs with scaling      The following portions of the patient's history were reviewed and updated as appropriate: allergies, current medications and problem list      Review of Systems   Gastrointestinal: Negative for anal bleeding and diarrhea  Musculoskeletal: Positive for myalgias and neck pain  All other systems reviewed and are negative  Objective:      Current Outpatient Medications:     albuterol (VENTOLIN HFA) 90 mcg/act inhaler, Inhale 2 puffs every 6 (six) hours as needed for wheezing, Disp: 18 g, Rfl: 1    multivitamin (THERAGRAN) TABS, Take 1 tablet by mouth daily, Disp: , Rfl:     Probiotic Product (PROBIOTIC-10 PO), Take by mouth, Disp: , Rfl:     rizatriptan (MAXALT-MLT) 10 MG disintegrating tablet, Take 1 tab at migraine onset, can repeat once in 2 hours  Max 2 tabs in 24 hours   Max 2 days a week , Disp: 9 tablet, Rfl: 1   verapamil (CALAN) 40 mg tablet, Take half tab qhs x 1 week, then 1 tab qhs x 1 week, then half tab AM and 1 tab nightly, then 1 tab BID , Disp: 60 tablet, Rfl: 1    Current Facility-Administered Medications:     AbobotulinumtoxinA SOLR 300 Units, 300 Units, Intramuscular, Q3 Months, Velasquez Esqueda MD, 300 Units at 07/16/19 0921    Blood pressure 100/68, pulse 67, height 5' 6" (1 676 m), weight 62 1 kg (137 lb)  Physical Exam   Constitutional: He is oriented to person, place, and time  He appears well-developed and well-nourished  No distress  No cough   HENT:   Head: Normocephalic  Right Ear: External ear normal    Left Ear: External ear normal    Mouth/Throat: No oropharyngeal exudate  Neck: No thyromegaly present  Cardiovascular: Normal rate and regular rhythm  Exam reveals no friction rub  No murmur heard  Pulmonary/Chest: Effort normal and breath sounds normal  He has no wheezes  Abdominal: Soft  Bowel sounds are normal  He exhibits no distension  There is no tenderness  Musculoskeletal: He exhibits tenderness  Anterior cervical tenderness left side more t than right  and less occipital   Lymphadenopathy:     He has no cervical adenopathy  Neurological: He is alert and oriented to person, place, and time  Skin: Skin is warm  No erythema  Scaling dermatitis on posterior legs   Nursing note and vitals reviewed

## 2019-09-23 ENCOUNTER — EVALUATION (OUTPATIENT)
Dept: PHYSICAL THERAPY | Facility: CLINIC | Age: 24
End: 2019-09-23
Payer: COMMERCIAL

## 2019-09-23 ENCOUNTER — TRANSCRIBE ORDERS (OUTPATIENT)
Dept: PHYSICAL THERAPY | Facility: CLINIC | Age: 24
End: 2019-09-23

## 2019-09-23 DIAGNOSIS — R07.9 CHEST PAIN, UNSPECIFIED TYPE: ICD-10-CM

## 2019-09-23 DIAGNOSIS — G89.29 CHRONIC NECK PAIN: Primary | ICD-10-CM

## 2019-09-23 DIAGNOSIS — M54.2 CHRONIC NECK PAIN: Primary | ICD-10-CM

## 2019-09-23 PROCEDURE — 97112 NEUROMUSCULAR REEDUCATION: CPT | Performed by: PHYSICAL THERAPIST

## 2019-09-23 PROCEDURE — 97110 THERAPEUTIC EXERCISES: CPT | Performed by: PHYSICAL THERAPIST

## 2019-09-23 PROCEDURE — 97140 MANUAL THERAPY 1/> REGIONS: CPT | Performed by: PHYSICAL THERAPIST

## 2019-09-23 NOTE — PROGRESS NOTES
PT Evaluation (Progress Note)    Today's date: 2019  Patient name: Cyrus Saucedo  : 1995  MRN: [id number]  Referring provider: Cesilia Mora DO  Dx:   Encounter Diagnosis     ICD-10-CM    1  Chronic neck pain M54 2     G89 29    2  Chest pain, unspecified type R07 9                   Assessment  Assessment details: Cyrus Saucedo  is a 24 y o  male presenting to outpatient physical therapy at [facility] with complaints of left sided neck pain and L chest pain that began approximately 2 5 years ago  Pt reports that he has overall a 50-60% improvement since I E however he continues to have the same level of pain  He has had injections in his neck approximately one month ago however without relief  He continues to present with possible post concussive syndrome which he has been limited by tolerance with initiation of exercises in therapy  He presents with decreased range of motion, decreased strength, limited flexibility, poor postural awareness, poor body mechanics, poor balance, decreased tolerance to activity and decreased functional mobility due to Chronic neck pain and chest pain, unspecified type  He would benefit from skilled PT services in order to address these deficits and reach maximum level of function   Thank you for the referral!  Impairments: abnormal muscle firing, abnormal muscle tone, abnormal or restricted ROM, activity intolerance, impaired physical strength, lacks appropriate home exercise program, pain with function, scapular dyskinesis, poor posture  and poor body mechanics    Symptom irritability: moderateUnderstanding of Dx/Px/POC: good   Prognosis: good    Goals  STG (in 4 weeks):  1  Pt will be independent with HEP - Progressing towards  2  Pt will report having at most a 2/10 pain - Progressing towards    LTGs (in 8 weeks)  1  Pt will report having a 75% improvement since I E - Progressing towards  2   Pt will be able to return to exercises and recreational activities with minimal modifications without increasing sxs - Progressing towards  3  Pt will be able to sit for prolonged periods without increasing pain - Progressing towards     Plan  Planned modality interventions: TENS, ultrasound and unattended electrical stimulation  Planned therapy interventions: therapeutic activities, therapeutic exercise, patient education, neuromuscular re-education, strengthening, stretching, home exercise program, manual therapy and joint mobilization  Frequency: 2x week  Duration in weeks: 8  Plan of Care beginning date: 19  Plan of Care expiration date: 19  Treatment plan discussed with: patient        Subjective Evaluation    History of Present Illness  Mechanism of injury: Pt is a 24year old male presents with L chest pain and L sided neck pain that began approximately 2 5 years ago secondary to unknown etiology  He reports that 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 and started feeling "worn down" with progressive chest pain with head pain  He also noticing having shortness of breath and dizziness even when at rest  He has been treated for Lyme's disease and then when he was tested again recently, he was still positive so he was placed on another round of treatment   Pt is planning on seeing a pulmonologist      PMhx: history of head trauma at work, light sensitivity   Pain  Current pain ratin  At best pain ratin  At worst pain ratin  Quality: discomfort, pulling, sharp, pressure and dull ache - radiating to the R side of his head that can be sharp at times  Relieving factors: relaxation and rest  Aggravating factors: running, lifting, sitting and overhead activity (any more strenous activity, prolonged sitting)  Progression: pain is the same however functionally improving     Hand dominance: left      Diagnostic Tests  X-ray: normal  Treatments  Previous treatment: physical therapy  Patient Goals  Patient goals for therapy: decreased pain, increased motion, increased strength, independence with ADLs/IADLs, return to sport/leisure activities and return to work (Be able to sit for prolonged periods in order to attend college)          Objective       Flowsheet Rows      Most Recent Value   PT/OT G-Codes   Current Score 49   Projected Score  52        Objective: (* = pain on L neck)  AROM: standing    Cervical Flexion 45 degrees     Cervical Extension 70 degrees   Cervical Lat Flex (R) 43 degrees  (L) 43 degrees   Cervical Rotation (R) 90 degrees (L) 90 degrees    FIS: 75 degrees  EIS: 20 degrees  Rotation: (R) 4 cm (L) 4 cm  Lat Flex: (R) 25 degrees (L) 20 degrees    B shoulders AROM is WNL    STRENGTH:    R   L    Shoulder flexion  4/5   4/5  Shoulder abduction  4/5   4/5  Shoulder ER   4/5   4/5  Shoulder IR   4/5   4+/5  Upper trap   4/5   4/5  Mid trap   4/5   4-/5*  Lower trap   4/5   3+/5    Posture: Pt's sitting posture is rounded shoulders  He demonstrates scap winging  Mechanical assessment:  Repeated cervical flexion increases neck pain however repeated cervical extension caused more pain       Special Tests: (-) cervical distraction    Post Concussive syndrome TBA    Precautions: standard     HEP: B ER, Bilateral Horizontal ABD     Specialty Daily Treatment Diary       Manual 9/11 9/16 9/19 9/23    TPR/STM cervical SMF AFB AFB SMF    PA c/s jt mobs SMF np SMF SMF    Rib jt mobs; clavicle inferior jt mob SMF np SMF SMF    Neck stretching SMF AFB AFB SMF    IASTM L SCM/scalenes/UT SMF (supine) AFB AFB  (supine) SMF    Exercise Diary         UBE 6 min retro 6 min retro 6 min retro 6 mins    Upper trap stretch 20 sec x 3 20 sec x 3 30"x3 30 sec x 3    Levator scap stretch 20 sec x 3 20 sec x 3 30"x3 30 sec x 3    SCM stretch 20 sec x 3 nv 30"x3 30 sec x 3    Planks (prone; sidelying) 20 sec x 3; 15 sec x 3 nv nv 20 sec x 3; 15 sec x 3    Sidelying thoracic rotations 5x ea  10"x5 ea 10"x5 ea DC    L Rib jt mob        Scap Retraction Light Blue: 30 Light Blue  30x Light Blue  30x Light Blue 30x    B S' Ext Light Blue: 30x  Light Blue  30x Light Blue  30x Light Blue 30x    Scaption/ Abduction 3# 3x10  3# 3x10  nv 3# 3x10    Wall Walks GTB to fatigue GTB to fatigue GTB to fatigue GTB to fatigue    Flex/Ext Combo GTB 2 rounds to fatigue  nv GTB 2 rounds to fatigue  GTB 2 rounds to fatigue    Pec stretch 30 sec x 3  30"x3 30"x3 30 sec x 3    Wall Pushup 30x 3x10 3x10  Off counter 3x10 off counter    3 way walk outs c cervical retraction NV perform RTB RTB  5x ea nv RTB 5x ea    "W" wall slides 10x 10x 12x 12x    Prone Y on PB 1# 3x10 c chin tuck 1# 3x10 c chin tuck 1# 3x10 c chin tuck 1# 3x10 c chin tuck    Prone T on PB 1# 3x10 c chin tucks 1# 3x10 c chin tucks 1# 3x10 c chin tucks 1# 3x10 c chin tucks    Prone I on PB 2# 3x10 c chin tucks  2# 3x10 c chin tucks  2# 3x10 c chin tucks  2# 3x10 c chin tucks    Foam Roller 2 mins  nv nv 2 mins    Body Blade        Modalities        MH 10 min 10 min def     CP

## 2019-09-23 NOTE — LETTER
2019    Alonzo Haji DO  [address]  [facility] [address]    Patient: Almita Garcia  YOB: 1995   Date of Visit: 2019     Encounter Diagnosis     ICD-10-CM    1  Chronic neck pain M54 2     G89 29    2  Chest pain, unspecified type R07 9        Dear Dr Cisco Morgan:    Thank you for your recent referral of Almita Garcia    Please review the attached evaluation summary from Alejandro's recent visit  Please verify that you agree with the plan of care by signing the attached order  If you have any questions or concerns, please do not hesitate to call  I sincerely appreciate the opportunity to share in the care of one of your patients and hope to have another opportunity to work with you in the near future  Sincerely,    Trixie Flaherty, PT      Referring Provider:      I certify that I have read the below Plan of Care and certify the need for these services furnished under this plan of treatment while under my care  DO Van Man[address] Sherine  [phone]  VIA Facsimile: [phone]          PT Evaluation (Progress Note)    Today's date: 2019  Patient name: Almita Garcia  : 1995  MRN: [id number]  Referring provider: Jennifer Chicas DO  Dx:   Encounter Diagnosis     ICD-10-CM    1  Chronic neck pain M54 2     G89 29    2  Chest pain, unspecified type R07 9                   Assessment  Assessment details: Almita Garcia  is a 24 y o  male presenting to outpatient physical therapy at [place] [address] with complaints of left sided neck pain and L chest pain that began approximately 2 5 years ago  Pt reports that he has overall a 50-60% improvement since I E however he continues to have the same level of pain  He has had injections in his neck approximately one month ago however without relief   He continues to present with possible post concussive syndrome which he has been limited by tolerance with initiation of exercises in therapy  He presents with decreased range of motion, decreased strength, limited flexibility, poor postural awareness, poor body mechanics, poor balance, decreased tolerance to activity and decreased functional mobility due to Chronic neck pain and chest pain, unspecified type  He would benefit from skilled PT services in order to address these deficits and reach maximum level of function   Thank you for the referral!  Impairments: abnormal muscle firing, abnormal muscle tone, abnormal or restricted ROM, activity intolerance, impaired physical strength, lacks appropriate home exercise program, pain with function, scapular dyskinesis, poor posture  and poor body mechanics    Symptom irritability: moderateUnderstanding of Dx/Px/POC: good   Prognosis: good    Goals  STG (in 4 weeks):  1  Pt will be independent with HEP - Progressing towards  2  Pt will report having at most a 2/10 pain - Progressing towards    LTGs (in 8 weeks)  1  Pt will report having a 75% improvement since I E - Progressing towards  2  Pt will be able to return to exercises and recreational activities with minimal modifications without increasing sxs - Progressing towards  3   Pt will be able to sit for prolonged periods without increasing pain - Progressing towards     Plan  Planned modality interventions: TENS, ultrasound and unattended electrical stimulation  Planned therapy interventions: therapeutic activities, therapeutic exercise, patient education, neuromuscular re-education, strengthening, stretching, home exercise program, manual therapy and joint mobilization  Frequency: 2x week  Duration in weeks: 8  Plan of Care beginning date: 9/23/19  Plan of Care expiration date: 11/18/19  Treatment plan discussed with: patient        Subjective Evaluation    History of Present Illness  Mechanism of injury: Pt is a 24year old male presents with L chest pain and L sided neck pain that began approximately 2 5 years ago secondary to unknown etiology  He reports that 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 and started feeling "worn down" with progressive chest pain with head pain  He also noticing having shortness of breath and dizziness even when at rest  He has been treated for Lyme's disease and then when he was tested again recently, he was still positive so he was placed on another round of treatment   Pt is planning on seeing a pulmonologist      PMhx: history of head trauma at work, light sensitivity   Pain  Current pain ratin  At best pain ratin  At worst pain ratin  Quality: discomfort, pulling, sharp, pressure and dull ache - radiating to the R side of his head that can be sharp at times  Relieving factors: relaxation and rest  Aggravating factors: running, lifting, sitting and overhead activity (any more strenous activity, prolonged sitting)  Progression: pain is the same however functionally improving     Hand dominance: left      Diagnostic Tests  X-ray: normal  Treatments  Previous treatment: physical therapy  Patient Goals  Patient goals for therapy: decreased pain, increased motion, increased strength, independence with ADLs/IADLs, return to sport/leisure activities and return to work (Be able to sit for prolonged periods in order to attend college)          Objective       Flowsheet Rows      Most Recent Value   PT/OT G-Codes   Current Score 49   Projected Score  52        Objective: (* = pain on L neck)  AROM: standing    Cervical Flexion 45 degrees     Cervical Extension 70 degrees   Cervical Lat Flex (R) 43 degrees  (L) 43 degrees   Cervical Rotation (R) 90 degrees (L) 90 degrees    FIS: 75 degrees  EIS: 20 degrees  Rotation: (R) 4 cm (L) 4 cm  Lat Flex: (R) 25 degrees (L) 20 degrees    B shoulders AROM is WNL    STRENGTH:    R   L    Shoulder flexion  4/5   4/5  Shoulder abduction  4/5   4/5  Shoulder ER   4/5   4/5  Shoulder IR   4/5   4+/5  Upper trap   4/5   4/5  Mid trap   4/5   4-/5*  Lower trap   4/5   3+/5    Posture: Pt's sitting posture is rounded shoulders  He demonstrates scap winging  Mechanical assessment:  Repeated cervical flexion increases neck pain however repeated cervical extension caused more pain       Special Tests: (-) cervical distraction    Post Concussive syndrome TBA    Precautions: standard     HEP: B ER, Bilateral Horizontal ABD     Specialty Daily Treatment Diary       Manual 9/11 9/16 9/19 9/23    TPR/STM cervical SMF AFB AFB SMF    PA c/s jt mobs SMF np SMF SMF    Rib jt mobs; clavicle inferior jt mob SMF np SMF SMF    Neck stretching SMF AFB AFB SMF    IASTM L SCM/scalenes/UT SMF (supine) AFB AFB  (supine) SMF    Exercise Diary         UBE 6 min retro 6 min retro 6 min retro 6 mins    Upper trap stretch 20 sec x 3 20 sec x 3 30"x3 30 sec x 3    Levator scap stretch 20 sec x 3 20 sec x 3 30"x3 30 sec x 3    SCM stretch 20 sec x 3 nv 30"x3 30 sec x 3    Planks (prone; sidelying) 20 sec x 3; 15 sec x 3 nv nv 20 sec x 3; 15 sec x 3    Sidelying thoracic rotations 5x ea  10"x5 ea 10"x5 ea DC    L Rib jt mob        Scap Retraction Light Blue: 30 Light Blue  30x Light Blue  30x Light Blue 30x    B S' Ext Light Blue: 30x  Light Blue  30x Light Blue  30x Light Blue 30x    Scaption/ Abduction 3# 3x10  3# 3x10  nv 3# 3x10    Wall Walks GTB to fatigue GTB to fatigue GTB to fatigue GTB to fatigue    Flex/Ext Combo GTB 2 rounds to fatigue  nv GTB 2 rounds to fatigue  GTB 2 rounds to fatigue    Pec stretch 30 sec x 3  30"x3 30"x3 30 sec x 3    Wall Pushup 30x 3x10 3x10  Off counter 3x10 off counter    3 way walk outs c cervical retraction NV perform RTB RTB  5x ea nv RTB 5x ea    "W" wall slides 10x 10x 12x 12x    Prone Y on PB 1# 3x10 c chin tuck 1# 3x10 c chin tuck 1# 3x10 c chin tuck 1# 3x10 c chin tuck    Prone T on PB 1# 3x10 c chin tucks 1# 3x10 c chin tucks 1# 3x10 c chin tucks 1# 3x10 c chin tucks    Prone I on PB 2# 3x10 c chin tucks  2# 3x10 c chin tucks  2# 3x10 c chin tucks 2# 3x10 c chin tucks    Foam Roller 2 mins  nv nv 2 mins    Body Blade        Modalities        MH 10 min 10 min def     CP

## 2019-09-25 ENCOUNTER — OFFICE VISIT (OUTPATIENT)
Dept: PHYSICAL THERAPY | Facility: CLINIC | Age: 24
End: 2019-09-25
Payer: COMMERCIAL

## 2019-09-25 DIAGNOSIS — G89.29 CHRONIC NECK PAIN: Primary | ICD-10-CM

## 2019-09-25 DIAGNOSIS — M54.2 CHRONIC NECK PAIN: Primary | ICD-10-CM

## 2019-09-25 DIAGNOSIS — R07.9 CHEST PAIN, UNSPECIFIED TYPE: ICD-10-CM

## 2019-09-25 PROCEDURE — 97140 MANUAL THERAPY 1/> REGIONS: CPT | Performed by: PHYSICAL THERAPIST

## 2019-09-25 PROCEDURE — 97112 NEUROMUSCULAR REEDUCATION: CPT | Performed by: PHYSICAL THERAPIST

## 2019-09-25 PROCEDURE — 97110 THERAPEUTIC EXERCISES: CPT | Performed by: PHYSICAL THERAPIST

## 2019-09-25 NOTE — PROGRESS NOTES
Daily Note     Today's date: 2019  Patient name: Eli Feng  : 1995  MRN: [id number]  Referring provider: Dustin Alas DO  Dx:   Encounter Diagnosis     ICD-10-CM    1  Chronic neck pain M54 2     G89 29    2  Chest pain, unspecified type R07 9                   Subjective: Pt reports that he feels okay today  Objective: See treatment diary below      Assessment: Tolerated treatment well; initiated POC with UBE f/b strengthening exercises  He requires vcs on proper sequencing with exercises and occasional vcs on form  He continues to report having head and neck pain with occasional chest pain  Pt reports the other day he felt light headed and "off"; discussed monitoring food and water intake  With his descriptions, he demonstrates signs and symptoms of possible anxiety  Instructed pt on monitoring his sxs and focus on breathing when sxs occur  Patient demonstrated fatigue post treatment and would benefit from continued PT      Plan: Continue per plan of care        Precautions: standard     HEP: convergence, Smooth pursuits all directions    Specialty Daily Treatment Diary       Manual    TPR/STM cervical SMF AFB AFB SMF SMF   PA c/s jt mobs SMF np SMF SMF SMF   Rib jt mobs; clavicle inferior jt mob SMF np SMF SMF SMF   Neck stretching SMF AFB AFB SMF SMF   IASTM L SCM/scalenes/UT SMF (supine) AFB AFB  (supine) SMF SMF   Exercise Diary         UBE 6 min retro 6 min retro 6 min retro 6 mins 6 mins   Upper trap stretch 20 sec x 3 20 sec x 3 30"x3 30 sec x 3 30 sec x 3   Levator scap stretch 20 sec x 3 20 sec x 3 30"x3 30 sec x 3 30 sec x 3   SCM stretch 20 sec x 3 nv 30"x3 30 sec x 3 30 sec x 3   Planks (prone; sidelying) 20 sec x 3; 15 sec x 3 nv nv 20 sec x 3; 15 sec x 3 20 sec x 3; 15 sec x 3   Sidelying thoracic rotations 5x ea  10"x5 ea 10"x5 ea DC    L Rib jt mob        Scap Retraction Light Blue: 30 Light Blue  30x Light Blue  30x Light Blue 30x Light Blue: 30   B S' Ext Light Blue: 30x  Light Blue  30x Light Blue  30x Light Blue 30x Light Blue: 30x   Scaption/ Abduction 3# 3x10  3# 3x10  nv 3# 3x10 3# 3x10    Wall Walks GTB to fatigue GTB to fatigue GTB to fatigue GTB to fatigue GTB to fatigue    Flex/Ext Combo GTB 2 rounds to fatigue  nv GTB 2 rounds to fatigue  GTB 2 rounds to fatigue GTB 2 rounds to fatigue    Pec stretch 30 sec x 3  30"x3 30"x3 30 sec x 3 30 sec x 3   Wall Pushup 30x 3x10 3x10  Off counter 3x10 off counter 3x10 off counter   3 way walk outs c cervical retraction NV perform RTB RTB  5x ea nv RTB 5x ea RTB 5x ea   "W" wall slides 10x 10x 12x 12x 12x   Prone Y on PB 1# 3x10 c chin tuck 1# 3x10 c chin tuck 1# 3x10 c chin tuck 1# 3x10 c chin tuck 2# 3x10 c chin tuck   Prone T on PB 1# 3x10 c chin tucks 1# 3x10 c chin tucks 1# 3x10 c chin tucks 1# 3x10 c chin tucks 2# 3x10 c chin tucks   Prone I on PB 2# 3x10 c chin tucks  2# 3x10 c chin tucks  2# 3x10 c chin tucks  2# 3x10 c chin tucks 2# 3x10 c chin tucks    Foam Roller 2 mins  nv nv 2 mins 2 mins   Body Blade     20 sec x 1 (flexion, abduction)   Modalities        MH 10 min 10 min def     CP

## 2019-09-26 ENCOUNTER — TELEPHONE (OUTPATIENT)
Dept: NEUROLOGY | Facility: CLINIC | Age: 24
End: 2019-09-26

## 2019-09-26 NOTE — TELEPHONE ENCOUNTER
Justino Press called to schedule botox delivery  Called ext [phone] but no answer  Call transferred to ext [phone] voicemail

## 2019-09-27 NOTE — TELEPHONE ENCOUNTER
Araceli,    The patient currently does not have a Botox appointment scheduled  Patient last had Botox on 2019  Please schedule the patient an appointment and I will coordinate with the specialty pharmacy to set up delivery once it is scheduled      Thank you,    Ramonia Apgar

## 2019-09-27 NOTE — TELEPHONE ENCOUNTER
Called [facility]- spoke with Mary Jaquez- she states the patient's Botox order is ready to be scheduled for delivery  Botox to be delivered on Tuesday 10/1/2019 to the [place] location- a signature will be required  Araceli,    Please await [facility] delivery and document once it has arrived  Please let me me know if you do not receive the patient's order      Thank you,    Beryl Peterson

## 2019-09-27 NOTE — TELEPHONE ENCOUNTER
Patient is scheduled with Dr Zora Cali on 10/22/2019 in the [place] location  Type Date User Summary Attachment   General 09/27/2019 11:30 AM Mary Cruz Care coordination  -   Note    Botox- authorization #: [id number]- valid from 7/5/2019 until 7/5/2020   Please use [facility]      Thank you,     Venkatesh Lezama

## 2019-09-27 NOTE — TELEPHONE ENCOUNTER
Venkatesh Lezama,    Patient is scheduled for 10/22 for his upcoming Botox appt  Thank you!   Araceli

## 2019-10-01 ENCOUNTER — OFFICE VISIT (OUTPATIENT)
Dept: PHYSICAL THERAPY | Facility: CLINIC | Age: 24
End: 2019-10-01
Payer: COMMERCIAL

## 2019-10-01 ENCOUNTER — DOCUMENTATION (OUTPATIENT)
Dept: NEUROLOGY | Facility: CLINIC | Age: 24
End: 2019-10-01

## 2019-10-01 DIAGNOSIS — M54.2 CHRONIC NECK PAIN: Primary | ICD-10-CM

## 2019-10-01 DIAGNOSIS — G89.29 CHRONIC NECK PAIN: Primary | ICD-10-CM

## 2019-10-01 DIAGNOSIS — R07.9 CHEST PAIN, UNSPECIFIED TYPE: ICD-10-CM

## 2019-10-01 PROCEDURE — 97140 MANUAL THERAPY 1/> REGIONS: CPT | Performed by: PHYSICAL THERAPIST

## 2019-10-01 PROCEDURE — 97010 HOT OR COLD PACKS THERAPY: CPT | Performed by: PHYSICAL THERAPIST

## 2019-10-01 PROCEDURE — 97110 THERAPEUTIC EXERCISES: CPT | Performed by: PHYSICAL THERAPIST

## 2019-10-01 PROCEDURE — 97112 NEUROMUSCULAR REEDUCATION: CPT | Performed by: PHYSICAL THERAPIST

## 2019-10-01 NOTE — PROGRESS NOTES
Daily Note     Today's date: 10/1/2019  Patient name: Joseluis Parikh  : 1995  MRN: [id number]  Referring provider: Zelalem Hui DO  Dx:   Encounter Diagnosis     ICD-10-CM    1  Chronic neck pain M54 2     G89 29    2  Chest pain, unspecified type R07 9                   Subjective: Pt reports that he feels okay today  Objective: See treatment diary below      Assessment: Tolerated treatment well; initiated POC with UBE f/b treadmill walking  Therapist educated pt on proper use of treadmill for safety and monitored how he set himself on the treadmill  Pt was independent after receiving instructions  Therapist continues to discuss with pt desensitizing tasks at home to wearing hats which he continues to not perform at home  He reports that recently he hit his head with a broom by accident and now has some soreness  Patient demonstrated fatigue post treatment and would benefit from continued PT      Plan: Continue per plan of care  Continue to advance to a walk-jog        Precautions: standard     HEP: convergence, Smooth pursuits all directions    Specialty Daily Treatment Diary       Manual 10/1    9/25   TPR/STM cervical SMF    SMF   PA c/s jt mobs SMF    SMF   Rib jt mobs; clavicle inferior jt mob SMF    SMF   Neck stretching SMF    SMF   IASTM L SCM/scalenes/UT SMF    SMF   Exercise Diary         UBE/T 4 retro; 5 mins at 2 7 mph    6 mins   Upper trap stretch 30 sec x 3    30 sec x 3   Levator scap stretch 30 sec x 3    30 sec x 3   SCM stretch 30 sec x 3    30 sec x 3   Planks (prone; sidelying) 20 sec x 3; 15 sec x 3    20 sec x 3; 15 sec x 3           L Rib jt mob        Scap Retraction Light Blue: 30    Light Blue: 30   B S' Ext Light Blue: 30    Light Blue: 30x   Scaption/ Abduction 3# 3x10    3# 3x10    Wall Walks GTB to fatigue     GTB to fatigue    Flex/Ext Combo GTB 2 rounds to fatigue    GTB 2 rounds to fatigue    Pec stretch 30 sec x 3    30 sec x 3   Wall Pushup 3x10 off counter    3x10 off counter   3 way walk outs c cervical retraction RTB 10 ea     RTB 5x ea   "W" wall slides 15x    12x   Prone Y on PB 2# 3x10 c chin tucks    2# 3x10 c chin tuck   Prone T on PB 2# 3x10 c chin tucks    2# 3x10 c chin tucks   Prone I on PB 2# 3x10 c chin tucks    2# 3x10 c chin tucks    Foam Roller 2 mins    2 mins   Body Blade 20 sec x 1 (diagonal, flex, abd)    20 sec x 1 (flexion, abduction)   Modalities        MH 10 mins       CP

## 2019-10-01 NOTE — PROGRESS NOTES
Botox recieved at [facility] location 10/1/19  [facility]:[id number]  LOT: [id number]  EXP:03/31/20  Documented and stored in fridge

## 2019-10-03 ENCOUNTER — OFFICE VISIT (OUTPATIENT)
Dept: PHYSICAL THERAPY | Facility: CLINIC | Age: 24
End: 2019-10-03
Payer: COMMERCIAL

## 2019-10-03 DIAGNOSIS — M54.2 CHRONIC NECK PAIN: ICD-10-CM

## 2019-10-03 DIAGNOSIS — G89.29 CHRONIC NECK PAIN: ICD-10-CM

## 2019-10-03 DIAGNOSIS — R07.9 CHEST PAIN, UNSPECIFIED TYPE: Primary | ICD-10-CM

## 2019-10-03 PROCEDURE — 97112 NEUROMUSCULAR REEDUCATION: CPT | Performed by: PHYSICAL THERAPIST

## 2019-10-03 PROCEDURE — 97140 MANUAL THERAPY 1/> REGIONS: CPT | Performed by: PHYSICAL THERAPIST

## 2019-10-03 PROCEDURE — 97110 THERAPEUTIC EXERCISES: CPT | Performed by: PHYSICAL THERAPIST

## 2019-10-03 PROCEDURE — 97010 HOT OR COLD PACKS THERAPY: CPT | Performed by: PHYSICAL THERAPIST

## 2019-10-03 NOTE — PROGRESS NOTES
Daily Note     Today's date: 10/3/2019  Patient name: Declan Moreno  : 1995  MRN: [id number]  Referring provider: Osman Goodwin DO  Dx:   Encounter Diagnosis     ICD-10-CM    1  Chest pain, unspecified type R07 9    2  Chronic neck pain M54 2     G89 29                   Subjective: Pt reports that today he has more chest pain secondary to unknown etiology  Objective: See treatment diary below      Assessment: Tolerated treatment well; he denies having increased sxs throughout session however he reports that he continues to have same intensity of pain  Therapist continues to encourage pt to progress tolerance to wearing hats at home  He requires vcs on proper sequencing with exercises  Patient demonstrated fatigue post treatment and would benefit from continued PT      Plan: Continue per plan of care        Precautions: standard     HEP: convergence, Smooth pursuits all directions    Specialty Daily Treatment Diary       Manual 10/1 10/3   9/25   TPR/STM cervical SMF SMF   SMF   PA c/s jt mobs SMF SMF   SMF   Rib jt mobs; clavicle inferior jt mob SMF SMF   SMF   Neck stretching SMF SMF   SMF   IASTM L SCM/scalenes/UT SMF SMF   SMF   Exercise Diary         UBE/T 4 retro; 5 mins at 2 7 mph 4 retro; 5 mins at 2 6 mph c 3 incline   6 mins   Upper trap stretch 30 sec x 3 30 sec x 3   30 sec x 3   Levator scap stretch 30 sec x 3 30 sec x 3    30 sec x 3   SCM stretch 30 sec x 3 30 sec x 3   30 sec x 3   Planks (prone; sidelying) 20 sec x 3; 15 sec x 3 20 sec x 3; 15 sec x 3   20 sec x 3; 15 sec x 3           L Rib jt mob        Scap Retraction Light Blue: 30 Light Blue: 30   Light Blue: 30   B S' Ext Light Blue: 30 Light Blue: 30   Light Blue: 30x   Scaption/ Abduction 3# 3x10 3# 3x10   3# 3x10    Wall Walks GTB to fatigue  GTB to fatigue   GTB to fatigue    Flex/Ext Combo GTB 2 rounds to fatigue GTB 2 rounds to fatigue   GTB 2 rounds to fatigue    Pec stretch 30 sec x 3 30 sec x 3   30 sec x 3 Wall Pushup 3x10 off counter Off high mat table: 20x   3x10 off counter   3 way walk outs c cervical retraction RTB 10 ea  RTB 10ea   RTB 5x ea   "W" wall slides 15x 15x   12x   Prone Y on PB 2# 3x10 c chin tucks 2# 3x10 c chin tucks   2# 3x10 c chin tuck   Prone T on PB 2# 3x10 c chin tucks 2# 3x10 c chin tucks   2# 3x10 c chin tucks   Prone I on PB 2# 3x10 c chin tucks 2# 3x10 c chin tucks   2# 3x10 c chin tucks    Foam Roller 2 mins 2 mins   2 mins   Body Blade 20 sec x 1 (diagonal, flex, abd) 20 sec x 1 (flex, abd, diagonal)   20 sec x 1 (flexion, abduction)   Modalities        MH 10 mins 10 mins       CP

## 2019-10-04 ENCOUNTER — TELEPHONE (OUTPATIENT)
Dept: FAMILY MEDICINE CLINIC | Facility: HOSPITAL | Age: 24
End: 2019-10-04

## 2019-10-04 DIAGNOSIS — R06.02 SOB (SHORTNESS OF BREATH): Primary | ICD-10-CM

## 2019-10-04 DIAGNOSIS — R00.2 PALPITATIONS: ICD-10-CM

## 2019-10-07 ENCOUNTER — APPOINTMENT (OUTPATIENT)
Dept: PHYSICAL THERAPY | Facility: CLINIC | Age: 24
End: 2019-10-07
Payer: COMMERCIAL

## 2019-10-09 ENCOUNTER — OFFICE VISIT (OUTPATIENT)
Dept: PHYSICAL THERAPY | Facility: CLINIC | Age: 24
End: 2019-10-09
Payer: COMMERCIAL

## 2019-10-09 DIAGNOSIS — R07.9 CHEST PAIN, UNSPECIFIED TYPE: Primary | ICD-10-CM

## 2019-10-09 DIAGNOSIS — G89.29 CHRONIC NECK PAIN: ICD-10-CM

## 2019-10-09 DIAGNOSIS — M54.2 CHRONIC NECK PAIN: ICD-10-CM

## 2019-10-09 PROCEDURE — 97112 NEUROMUSCULAR REEDUCATION: CPT | Performed by: PHYSICAL THERAPIST

## 2019-10-09 PROCEDURE — 97140 MANUAL THERAPY 1/> REGIONS: CPT | Performed by: PHYSICAL THERAPIST

## 2019-10-09 PROCEDURE — 97010 HOT OR COLD PACKS THERAPY: CPT | Performed by: PHYSICAL THERAPIST

## 2019-10-09 PROCEDURE — 97110 THERAPEUTIC EXERCISES: CPT | Performed by: PHYSICAL THERAPIST

## 2019-10-09 NOTE — PROGRESS NOTES
Daily Note     Today's date: 10/9/2019  Patient name: Ian Murray  : 1995  MRN: [id number]  Referring provider: Ildefonso Villar DO  Dx:   Encounter Diagnosis     ICD-10-CM    1  Chest pain, unspecified type R07 9    2  Chronic neck pain M54 2     G89 29                   Subjective: Pt reports that he has chest pain with more strenuous activity  Objective: See treatment diary below      Assessment: Tolerated treatment well; initiated POC with UBE f/b walk/jog on treadmill which patient was challenged  He reports having increased chest pain after performing walk-jog  He demonstrates accessory muscle reliance when breathing and thus therapist encouraged pt to perform more diaphragmatic breathing to avoid extra strain on his area of pain  He was encouraged pt focus on his breathing throughout exercises  He demonstrates increased tenderness with palpation around his transverse processes in his cervical spine on the left side which slightly improved post manual therapy  Pt consented to treatment and therapist educated on proper responses  He was encouraged to focus on his breathing when he has increased sxs at work and to perform stretches throughout the day  Patient demonstrated fatigue post treatment and would benefit from continued PT      Plan: Continue per plan of care        Precautions: standard     HEP: convergence, Smooth pursuits all directions    Specialty Daily Treatment Diary       Manual 10/1 10/3 10/9  9/25   TPR/STM cervical SMF SMF SMF  SMF   PA c/s jt mobs SMF SMF SMF  SMF   Rib jt mobs; clavicle inferior jt mob SMF SMF SMF  SMF   Neck stretching SMF SMF SMF  SMF   IASTM L SCM/scalenes/UT SMF SMF SMF  SMF   Exercise Diary         UBE/T 4 retro; 5 mins at 2 7 mph 4 retro; 5 mins at 2 6 mph c 3 incline 4 retro; 8 mins walk-jog (jog for 20 secs and walk for 1 minute)  6 mins   Upper trap stretch 30 sec x 3 30 sec x 3 30 sec x 3  30 sec x 3   Levator scap stretch 30 sec x 3 30 sec x 3 30 sec x 3  30 sec x 3   SCM stretch 30 sec x 3 30 sec x 3 30 sec x 3  30 sec x 3   Planks (prone; sidelying) 20 sec x 3; 15 sec x 3 20 sec x 3; 15 sec x 3 20 sec x 3; 15 sec x 3  20 sec x 3; 15 sec x 3           Supine Pec Flys   3# 3x10     Scap Retraction Light Blue: 30 Light Blue: 30 Light Blue: 30x  Light Blue: 30   B S' Ext Light Blue: 30 Light Blue: 30 Light Blue: 30x  Light Blue: 30x   Scaption/ Abduction 3# 3x10 3# 3x10 3# 3x10  3# 3x10    Wall Walks GTB to fatigue  GTB to fatigue YTB to fatigue  GTB to fatigue    Flex/Ext Combo GTB 2 rounds to fatigue GTB 2 rounds to fatigue BTB 2 rounds to fatigue  GTB 2 rounds to fatigue    Pec stretch 30 sec x 3 30 sec x 3 30 sec x 3  30 sec x 3   Wall Pushup 3x10 off counter Off high mat table: 20x Off high mat table: 20  3x10 off counter   3 way walk outs c cervical retraction RTB 10 ea  RTB 10ea GTB 10 ea  RTB 5x ea   "W" wall slides 15x 15x 2x10  12x   Prone Y on PB 2# 3x10 c chin tucks 2# 3x10 c chin tucks 2# 3x10 c chin tucks  2# 3x10 c chin tuck   Prone T on PB 2# 3x10 c chin tucks 2# 3x10 c chin tucks 2# 3x10 c chin tucks  2# 3x10 c chin tucks   Prone I on PB 2# 3x10 c chin tucks 2# 3x10 c chin tucks 2# 3x10 c chin tucks  2# 3x10 c chin tucks    Foam Roller 2 mins 2 mins 2 mins   2 mins   Body Blade 20 sec x 1 (diagonal, flex, abd) 20 sec x 1 (flex, abd, diagonal) 20 sec x 1 (flex, abd, diagonal)  20 sec x 1 (flexion, abduction)   Modalities        MH 10 mins 10 mins  10 mins      CP

## 2019-10-11 ENCOUNTER — OFFICE VISIT (OUTPATIENT)
Dept: PHYSICAL THERAPY | Facility: CLINIC | Age: 24
End: 2019-10-11
Payer: COMMERCIAL

## 2019-10-11 DIAGNOSIS — G89.29 CHRONIC NECK PAIN: ICD-10-CM

## 2019-10-11 DIAGNOSIS — R07.9 CHEST PAIN, UNSPECIFIED TYPE: Primary | ICD-10-CM

## 2019-10-11 DIAGNOSIS — M54.2 CHRONIC NECK PAIN: ICD-10-CM

## 2019-10-11 PROCEDURE — 97112 NEUROMUSCULAR REEDUCATION: CPT | Performed by: PHYSICAL THERAPIST

## 2019-10-11 PROCEDURE — 97110 THERAPEUTIC EXERCISES: CPT | Performed by: PHYSICAL THERAPIST

## 2019-10-11 PROCEDURE — 97010 HOT OR COLD PACKS THERAPY: CPT | Performed by: PHYSICAL THERAPIST

## 2019-10-11 PROCEDURE — 97140 MANUAL THERAPY 1/> REGIONS: CPT | Performed by: PHYSICAL THERAPIST

## 2019-10-11 NOTE — PROGRESS NOTES
Daily Note     Today's date: 10/11/2019  Patient name: Joseluis Parikh  : 1995  MRN: [id number]  Referring provider: Zelalem Hui DO  Dx:   Encounter Diagnosis     ICD-10-CM    1  Chest pain, unspecified type R07 9    2  Chronic neck pain M54 2     G89 29                   Subjective: Pt reports that he feels okay today  Objective: See treatment diary below      Assessment: Tolerated treatment well; initiated POC with UBE f/b walk-jog on treadmill  Pt requires occasional vcs on proper sequencing with exercises  He reports having some neck pain throughout session; encouraged pt to try stretching prior to performing exercise that causes some discomfort  Added plank with weight pulls  Pt may benefit from massage therapy and pain management involvement  He demonstrates slow improvements with tolerance to increasing activity however he continues to have occasional significant pain in his chest and neck  Patient demonstrated fatigue post treatment and would benefit from continued PT      Plan: Continue per plan of care        Precautions: standard     HEP: convergence, Smooth pursuits all directions    Specialty Daily Treatment Diary       Manual 10/1 10/3 10/9 10/11    TPR/STM cervical SMF SMF SMF SMF    PA c/s jt mobs SMF SMF SMF SMF    Rib jt mobs; clavicle inferior jt mob SMF SMF SMF SMF    Neck stretching SMF SMF SMF SMF    IASTM L SCM/scalenes/UT SMF SMF SMF SMF    Exercise Diary         UBE/T 4 retro; 5 mins at 2 7 mph 4 retro; 5 mins at 2 6 mph c 3 incline 4 retro; 8 mins walk-jog (jog for 20 secs and walk for 1 minute) 4 retro; 8 mins walk-jog (1 minute each)    Upper trap stretch 30 sec x 3 30 sec x 3 30 sec x 3 30 sec x 3    Levator scap stretch 30 sec x 3 30 sec x 3  30 sec x 3 30 sec x 3    SCM stretch 30 sec x 3 30 sec x 3 30 sec x 3 30 sec x 3    Planks (prone; sidelying) 20 sec x 3; 15 sec x 3 20 sec x 3; 15 sec x 3 20 sec x 3; 15 sec x 3 20 sec x 3; 15 sec x 3    Plank weight pulls 5# 2x10    Supine Pec Flys   3# 3x10 NV    Scap Retraction Light Blue: 30 Light Blue: 30 Light Blue: 30x M: 30x    B S' Ext Light Blue: 30 Light Blue: 30 Light Blue: 30x M: 30x    Scaption/ Abduction 3# 3x10 3# 3x10 3# 3x10 4# 3x10    Wall Walks GTB to fatigue  GTB to fatigue YTB to fatigue YTB to fatigue    Flex/Ext Combo GTB 2 rounds to fatigue GTB 2 rounds to fatigue BTB 2 rounds to fatigue BTB 2 rounds to fatigue    Pec stretch 30 sec x 3 30 sec x 3 30 sec x 3 30 sec x 3     Wall Pushup 3x10 off counter Off high mat table: 20x Off high mat table: 20 Off high mat table: 20x    3 way walk outs c cervical retraction RTB 10 ea  RTB 10ea GTB 10 ea GTB 10 ea    "W" wall slides 15x 15x 2x10 2x10    Prone Y on PB 2# 3x10 c chin tucks 2# 3x10 c chin tucks 2# 3x10 c chin tucks 2# 3x10 c chin tucks    Prone T on PB 2# 3x10 c chin tucks 2# 3x10 c chin tucks 2# 3x10 c chin tucks 2# 3x10 c chin tucks    Prone I on PB 2# 3x10 c chin tucks 2# 3x10 c chin tucks 2# 3x10 c chin tucks 2# 3x10 c chin tucks    Foam Roller 2 mins 2 mins 2 mins  2 mins    Body Blade 20 sec x 1 (diagonal, flex, abd) 20 sec x 1 (flex, abd, diagonal) 20 sec x 1 (flex, abd, diagonal) 20 sec x 1 (flex, abd, diagonal)    Modalities        MH 10 mins 10 mins  10 mins  10 mins     CP

## 2019-10-14 ENCOUNTER — OFFICE VISIT (OUTPATIENT)
Dept: PHYSICAL THERAPY | Facility: CLINIC | Age: 24
End: 2019-10-14
Payer: COMMERCIAL

## 2019-10-14 DIAGNOSIS — R07.9 CHEST PAIN, UNSPECIFIED TYPE: Primary | ICD-10-CM

## 2019-10-14 DIAGNOSIS — G89.29 CHRONIC NECK PAIN: ICD-10-CM

## 2019-10-14 DIAGNOSIS — M54.2 CHRONIC NECK PAIN: ICD-10-CM

## 2019-10-14 PROCEDURE — 97140 MANUAL THERAPY 1/> REGIONS: CPT | Performed by: PHYSICAL THERAPIST

## 2019-10-14 PROCEDURE — 97110 THERAPEUTIC EXERCISES: CPT | Performed by: PHYSICAL THERAPIST

## 2019-10-14 PROCEDURE — 97112 NEUROMUSCULAR REEDUCATION: CPT | Performed by: PHYSICAL THERAPIST

## 2019-10-14 PROCEDURE — 97010 HOT OR COLD PACKS THERAPY: CPT | Performed by: PHYSICAL THERAPIST

## 2019-10-14 NOTE — PROGRESS NOTES
Daily Note     Today's date: 10/14/2019  Patient name: Chapo Chirinos  : 1995  MRN: [id number]  Referring provider: Yasemin Robin DO  Dx:   Encounter Diagnosis     ICD-10-CM    1  Chest pain, unspecified type R07 9    2  Chronic neck pain M54 2     G89 29                   Subjective: Pt reports that he continues to have significant pain in his neck  He has seen pain management which did not decrease his pain but the next step is having an epidural        Objective: See treatment diary below      Assessment: Tolerated treatment well; he requires vcs with sequencing of exercises  He reports having difficulty today compared to last week  He does not bring his inhaler and therapist discussed importance of bringing it if he has difficulty with breathing  Therapist discussed fear avoidance with patient and importance of slowly resuming usual activities within his tolerance  Patient demonstrated fatigue post treatment and would benefit from continued PT      Plan: Continue per plan of care        Precautions: standard     HEP: convergence, Smooth pursuits all directions    Specialty Daily Treatment Diary       Manual 10/1 10/3 10/9 10/11 10/14   TPR/STM cervical SMF SMF SMF SMF SMF   PA c/s jt mobs SMF SMF SMF SMF SMF   Rib jt mobs; clavicle inferior jt mob SMF SMF SMF SMF SMF   Neck stretching SMF SMF SMF SMF SMF   IASTM L SCM/scalenes/UT SMF SMF SMF SMF SMF   Exercise Diary         UBE/T 4 retro; 5 mins at 2 7 mph 4 retro; 5 mins at 2 6 mph c 3 incline 4 retro; 8 mins walk-jog (jog for 20 secs and walk for 1 minute) 4 retro; 8 mins walk-jog (1 minute each) 4 retro; 8 mins walk-jog (1 min ea)   Upper trap stretch 30 sec x 3 30 sec x 3 30 sec x 3 30 sec x 3 30 sec x 3   Levator scap stretch 30 sec x 3 30 sec x 3  30 sec x 3 30 sec x 3 30 sec x 3   SCM stretch 30 sec x 3 30 sec x 3 30 sec x 3 30 sec x 3 30 sec x 3   Planks (prone; sidelying) 20 sec x 3; 15 sec x 3 20 sec x 3; 15 sec x 3 20 sec x 3; 15 sec x 3 20 sec x 3; 15 sec x 3 20 sec x 3; 15 sec x 3   Plank weight pulls    5# 2x10 6# 2x10   Supine Pec Flys   3# 3x10 NV 3# 3x10   Scap Retraction Light Blue: 30 Light Blue: 30 Light Blue: 30x M: 30x M: 30x   B S' Ext Light Blue: 30 Light Blue: 30 Light Blue: 30x M: 30x M: 30x   Scaption/ Abduction 3# 3x10 3# 3x10 3# 3x10 4# 3x10 4# 3x10   Wall Walks GTB to fatigue  GTB to fatigue YTB to fatigue YTB to fatigue YTB to fatigue   Flex/Ext Combo GTB 2 rounds to fatigue GTB 2 rounds to fatigue BTB 2 rounds to fatigue BTB 2 rounds to fatigue BTB 2 rounds to fatigue   Pec stretch 30 sec x 3 30 sec x 3 30 sec x 3 30 sec x 3  30 sec x 3   Wall Pushup 3x10 off counter Off high mat table: 20x Off high mat table: 20 Off high mat table: 20x On Floor: 2x10   3 way walk outs c cervical retraction RTB 10 ea  RTB 10ea GTB 10 ea GTB 10 ea GTB 10 ea   "W" wall slides 15x 15x 2x10 2x10 2x10   Prone Y on PB 2# 3x10 c chin tucks 2# 3x10 c chin tucks 2# 3x10 c chin tucks 2# 3x10 c chin tucks 2# 3x10 c chin tucks   Prone T on PB 2# 3x10 c chin tucks 2# 3x10 c chin tucks 2# 3x10 c chin tucks 2# 3x10 c chin tucks 2# 3x10 c chin tucks   Prone I on PB 2# 3x10 c chin tucks 2# 3x10 c chin tucks 2# 3x10 c chin tucks 2# 3x10 c chin tucks 2# 3x10 c chin tucks   Foam Roller 2 mins 2 mins 2 mins  2 mins 2 mins   Body Blade 20 sec x 1 (diagonal, flex, abd) 20 sec x 1 (flex, abd, diagonal) 20 sec x 1 (flex, abd, diagonal) 20 sec x 1 (flex, abd, diagonal) 20 sec x 1 (flex, abd, diagonal)   Modalities        MH 10 mins 10 mins  10 mins  10 mins  10 mins    CP

## 2019-10-16 ENCOUNTER — OFFICE VISIT (OUTPATIENT)
Dept: PHYSICAL THERAPY | Facility: CLINIC | Age: 24
End: 2019-10-16
Payer: COMMERCIAL

## 2019-10-16 DIAGNOSIS — R07.9 CHEST PAIN, UNSPECIFIED TYPE: Primary | ICD-10-CM

## 2019-10-16 DIAGNOSIS — M54.2 CHRONIC NECK PAIN: ICD-10-CM

## 2019-10-16 DIAGNOSIS — G89.29 CHRONIC NECK PAIN: ICD-10-CM

## 2019-10-16 PROCEDURE — 97112 NEUROMUSCULAR REEDUCATION: CPT | Performed by: PHYSICAL THERAPIST

## 2019-10-16 PROCEDURE — 97110 THERAPEUTIC EXERCISES: CPT | Performed by: PHYSICAL THERAPIST

## 2019-10-16 PROCEDURE — 97010 HOT OR COLD PACKS THERAPY: CPT | Performed by: PHYSICAL THERAPIST

## 2019-10-16 NOTE — PROGRESS NOTES
Daily Note     Today's date: 10/16/2019  Patient name: Jos Dutton  : 1995  MRN: [id number]  Referring provider: Trista Cochran DO  Dx:   Encounter Diagnosis     ICD-10-CM    1  Chest pain, unspecified type R07 9    2  Chronic neck pain M54 2     G89 29                   Subjective: Pt reports that he is feeling okay today  Objective: See treatment diary below      Assessment: Tolerated treatment well; initiated POC with UBE  He reports having not as much pain today  He continues to have good form with exercises; occasionally requires vcs on proper sequencing  He is slowly improving strength overall in UE and mid back  Encouraged pt to continue to perform desensitizing activities especially with wearing hats at home  Patient demonstrated fatigue post treatment and would benefit from continued PT      Plan: Continue per plan of care        Precautions: standard     HEP: convergence, Smooth pursuits all directions    Specialty Daily Treatment Diary       Manual 10/16    10/14   TPR/STM cervical SMF    SMF   PA c/s jt mobs     SMF   Rib jt mobs; clavicle inferior jt mob     SMF   Neck stretching     SMF   IASTM L SCM/scalenes/UT SMF    SMF   Exercise Diary         UBE/T 4 retro; 8 mins walk-jog (1 min ea)    4 retro; 8 mins walk-jog (1 min ea)   Upper trap stretch 30 sec x 3    30 sec x 3   Levator scap stretch 30 sec x 3    30 sec x 3   SCM stretch 30 sec x 3    30 sec x 3   Planks (prone; sidelying)     20 sec x 3; 15 sec x 3   Plank weight pulls 6# 2x10     6# 2x10   Supine Pec Flys     3# 3x10   Scap Retraction M: 27    M: 30x   B S' Ext M: 30    M: 30x   Scaption/ Abduction 4# 3x10    4# 3x10   Wall Walks GTB to fatigue     YTB to fatigue   Flex/Ext Combo BTB 2 rounds to fatigue     BTB 2 rounds to fatigue   Pec stretch 30 sec x 3    30 sec x 3   Wall Pushup On Floor: 20x    On Floor: 2x10   3 way walk outs c cervical retraction GTB 10 ea    GTB 10 ea   "W" wall slides 2x10    2x10   Prone Y on PB 2# 3x10 c chin tucks    2# 3x10 c chin tucks   Prone T on PB 2# 3x10 c chin tucks    2# 3x10 c chin tucks   Prone I on PB 2# 3x10 c chin tucks    2# 3x10 c chin tucks   Foam Roller 2 mins     2 mins   Body Blade 20 sec x 1 (flex, abd, diagonal)     20 sec x 1 (flex, abd, diagonal)   Modalities         10 mins     10 mins    CP

## 2019-10-18 ENCOUNTER — OFFICE VISIT (OUTPATIENT)
Dept: PULMONOLOGY | Facility: HOSPITAL | Age: 24
End: 2019-10-18
Payer: COMMERCIAL

## 2019-10-18 VITALS
WEIGHT: 137 LBS | HEART RATE: 88 BPM | TEMPERATURE: 97.3 F | HEIGHT: 66 IN | BODY MASS INDEX: 22.02 KG/M2 | DIASTOLIC BLOOD PRESSURE: 70 MMHG | SYSTOLIC BLOOD PRESSURE: 100 MMHG | OXYGEN SATURATION: 100 %

## 2019-10-18 DIAGNOSIS — IMO0002 CHRONIC MIGRAINE: ICD-10-CM

## 2019-10-18 DIAGNOSIS — R53.82 CHRONIC FATIGUE: ICD-10-CM

## 2019-10-18 DIAGNOSIS — R06.02 SOB (SHORTNESS OF BREATH): Primary | ICD-10-CM

## 2019-10-18 PROCEDURE — 99213 OFFICE O/P EST LOW 20 MIN: CPT | Performed by: PHYSICIAN ASSISTANT

## 2019-10-18 NOTE — PROGRESS NOTES
Assessment:    1  SOB (shortness of breath)  Pulmonary exercise stress test (Cardiopulmonary exercise stress test)   2  Chronic fatigue     3  Chronic migraine         Patient has barium swallow ordered by Dr Joanna Yu:   1  CPEX testing ordered  Dyspnea mostly with exertion of unclear etiology with essentially normal pulmonary workup at this time  2  Continue albuterol p r n   3  Chronic headaches per Neurology  4  Follow-up post CPEX testing with Dr Ryland Jenkins  5  Patient call with questions/concerns or if he develops any worsening symptoms  05/10/2019 chest x-ray PA and lateral reviewed  No acute cardiopulmonary disease noted      Complete pulmonary function testing with methacholine challenge 09/04/2019 reviewed     Results:  FEV1/FVC Ratio: 84 %  Forced Vital Capacity: 4 35 L    95 % predicted  FEV1: 3 64 L     94 % predicted  After administration of bronchodilator: FEV1 3 61L (0% change), FVC 4 18 (3% decrease)     Lung volumes by body plethysmography:   Total Lung Capacity 118 % predicted   Residual volume 197 % predicted     DLCO corrected for patients hemoglobin level: 115 %     Methacholine challenge: 13% decrease in FEV1 following administration of 16mg/ml of methacholine     Interpretation:     · Normal TLC with evidence of air trapping based on RV     · Normal spirometry     · No significant improvement in airflow or vital capacity in response to the administration to bronchodilator     · Normal diffusion capacity     · No significant response to methacholine challenge        Subjective:     Patient ID: Sofya Dacosta  is a 24 y o  male  Chief Complaint:  HPI  26-year-old male presents for follow-up visit for his chronic dyspnea mostly with exertion for approximately 3 years  Patient completed pulmonary function testing with methacholine challenge  Normal except for elevated residual volume    Patient states his degree of dyspnea has remained stable since evaluated by Dr Ryland Jenkins in September  Patient has had prior cardiac workup which was normal   Has been evaluated by Rheumatology for chronic pain and Neurology for his chronic headaches  Patient denies any history of asthma  Is a nonsmoker  Denies any seasonal allergies  States he does notice dyspnea and does worsen when exposed to dust or fumes  Works for a construction supplier  States that he does notice some mild improvement with his dyspnea when uses his albuterol rescue inhaler  Reports chronic symptoms of fatigue  No reported snoring or apneic spells  Review of Systems  Patient denies any chest pain, pleurisy, hemoptysis, significant cough, abdominal pain, nausea, vomiting, diarrhea, fevers, chills  ROS per    Objective:  /70 (BP Location: Left arm, Patient Position: Sitting, Cuff Size: Standard)   Pulse 88   Temp (!) 97 3 °F (36 3 °C) (Tympanic)   Ht 5' 6" (1 676 m)   Wt 62 1 kg (137 lb)   SpO2 100%   BMI 22 11 kg/m²     Physical Exam   Constitutional: He is oriented to person, place, and time  He appears well-developed and well-nourished  Non-toxic appearance  He does not appear ill  No distress  HENT:   Head: Normocephalic and atraumatic  Nose: Nose normal    Mouth/Throat: Oropharynx is clear and moist    Eyes: Pupils are equal, round, and reactive to light  Conjunctivae are normal  No scleral icterus  Neck: Neck supple  No tracheal deviation present  Cardiovascular: Normal rate, regular rhythm and intact distal pulses  Exam reveals no gallop and no friction rub  No murmur heard  Pulmonary/Chest: Effort normal and breath sounds normal  No respiratory distress  He has no decreased breath sounds  He has no wheezes  He has no rhonchi  He has no rales  He exhibits no tenderness  Abdominal: Soft  Bowel sounds are normal  There is no tenderness  Musculoskeletal: He exhibits no edema or tenderness          Right lower leg: Normal         Left lower leg: Normal    Lymphadenopathy:     He has no cervical adenopathy  Neurological: He is alert and oriented to person, place, and time  He is not disoriented  Skin: No rash noted  He is not diaphoretic  Psychiatric: He has a normal mood and affect  His behavior is normal  His mood appears not anxious  He is not agitated  Nursing note and vitals reviewed

## 2019-10-21 ENCOUNTER — OFFICE VISIT (OUTPATIENT)
Dept: PHYSICAL THERAPY | Facility: CLINIC | Age: 24
End: 2019-10-21
Payer: COMMERCIAL

## 2019-10-21 ENCOUNTER — TRANSCRIBE ORDERS (OUTPATIENT)
Dept: PHYSICAL THERAPY | Facility: CLINIC | Age: 24
End: 2019-10-21

## 2019-10-21 ENCOUNTER — DOCUMENTATION (OUTPATIENT)
Dept: PULMONOLOGY | Facility: CLINIC | Age: 24
End: 2019-10-21

## 2019-10-21 DIAGNOSIS — M54.2 CHRONIC NECK PAIN: ICD-10-CM

## 2019-10-21 DIAGNOSIS — R07.9 CHEST PAIN, UNSPECIFIED TYPE: Primary | ICD-10-CM

## 2019-10-21 DIAGNOSIS — G89.29 CHRONIC NECK PAIN: ICD-10-CM

## 2019-10-21 PROCEDURE — 97112 NEUROMUSCULAR REEDUCATION: CPT | Performed by: PHYSICAL THERAPIST

## 2019-10-21 PROCEDURE — 97010 HOT OR COLD PACKS THERAPY: CPT | Performed by: PHYSICAL THERAPIST

## 2019-10-21 PROCEDURE — 97110 THERAPEUTIC EXERCISES: CPT | Performed by: PHYSICAL THERAPIST

## 2019-10-21 NOTE — PROGRESS NOTES
PT Evaluation (Progress Note)    Today's date: 2019  Patient name: Melissa Yarbrough  : 1995  MRN: [id number]  Referring provider: Carlo Sadler DO  Dx:   Encounter Diagnosis     ICD-10-CM    1  Chronic neck pain M54 2     G89 29    2  Chest pain, unspecified type R07 9                   Assessment  Assessment details: Melissa Yarbrough  is a 24 y o  male presenting to outpatient physical therapy at [place] [address] with complaints of left sided neck pain and L chest pain that began approximately 2 5 years ago  Pt reports that he has overall a 50-60% improvement since I E however he continues to have the same level of pain  He has had injections in his neck approximately one month ago however without relief  He presents with decreased range of motion, decreased strength, limited flexibility, poor postural awareness, poor body mechanics, poor balance, decreased tolerance to activity and decreased functional mobility due to Chronic neck pain and chest pain, unspecified type  He was slowly been improving with tolerance to exercises and increasing cardiovascular endurance  Pt is scheduling to see a cardiopulmomary specialist   He would benefit from skilled PT services in order to address these deficits and reach maximum level of function   Thank you for the referral!  Impairments: abnormal muscle firing, abnormal muscle tone, abnormal or restricted ROM, activity intolerance, impaired physical strength, lacks appropriate home exercise program, pain with function, scapular dyskinesis, poor posture  and poor body mechanics    Symptom irritability: moderateUnderstanding of Dx/Px/POC: good   Prognosis: good    Goals  STG (in 4 weeks):  1  Pt will be independent with HEP - Progressing towards  2  Pt will report having at most a 2/10 pain - Progressing towards    LTGs (in 8 weeks)  1  Pt will report having a 75% improvement since I E - Progressing towards  2   Pt will be able to return to exercises and recreational activities with minimal modifications without increasing sxs - Progressing towards  3  Pt will be able to sit for prolonged periods without increasing pain - Progressing towards     Plan  Planned modality interventions: TENS, ultrasound and unattended electrical stimulation  Planned therapy interventions: therapeutic activities, therapeutic exercise, patient education, neuromuscular re-education, strengthening, stretching, home exercise program, manual therapy and joint mobilization  Frequency: 2x week  Duration in weeks: 8  Plan of Care beginning date: 10/21/19  Plan of Care expiration date: 19  Treatment plan discussed with: patient        Subjective Evaluation    History of Present Illness  Mechanism of injury: Pt is a 24year old male presents with L chest pain and L sided neck pain that began approximately 2 5 years ago secondary to unknown etiology  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 and started feeling "worn down" with progressive chest pain with head pain  He also noticing having shortness of breath and dizziness even when at rest  He has been treated for Lyme's disease and then when he was tested again recently, he was still positive so he was placed on another round of treatment   Pt is planning on seeing a pulmonologist      PMhx: history of head trauma at work, light sensitivity   Pain  Current pain ratin  At best pain ratin  At worst pain ratin  Quality: discomfort, pulling, sharp, pressure and dull ache - radiating to the R side of his head that can be sharp at times  Relieving factors: relaxation and rest  Aggravating factors: running, lifting, sitting and overhead activity (any more strenous activity, prolonged sitting)  Progression: pain is the same however functionally improving     Hand dominance: left      Diagnostic Tests  X-ray: normal  Treatments  Previous treatment: physical therapy  Patient Goals  Patient goals for therapy: decreased pain, increased motion, increased strength, independence with ADLs/IADLs, return to sport/leisure activities and return to work (Be able to sit for prolonged periods in order to attend college)          Objective       Flowsheet Rows      Most Recent Value   PT/OT G-Codes   Current Score 43   Projected Score  52        Objective: (* = pain on L neck)  AROM: standing    Cervical Flexion 45 degrees     Cervical Extension 70 degrees   Cervical Lat Flex (R) 43 degrees  (L) 43 degrees   Cervical Rotation (R) 90 degrees (L) 90 degrees    FIS: 75 degrees  EIS: 20 degrees  Rotation: (R) 4 cm (L) 4 cm  Lat Flex: (R) 25 degrees (L) 20 degrees    B shoulders AROM is WNL    STRENGTH:    R   L    Shoulder flexion  4/5   4/5  Shoulder abduction  4/5   4/5  Shoulder ER   4/5   4/5  Shoulder IR   4/5   4+/5  Upper trap   4/5   4/5  Mid trap   4/5   4/5  Lower trap   4/5   4-/5    Posture: Pt's sitting posture is rounded shoulders  He demonstrates scap winging  Mechanical assessment:  Repeated cervical flexion increases neck pain however repeated cervical extension caused more pain       Special Tests: (-) cervical distraction    Post Concussive syndrome TBA    Precautions: standard     HEP: B ER, Bilateral Horizontal ABD     Specialty Daily Treatment Diary       Manual 10/16 10/21   10/14   TPR/STM cervical SMF SMF   SMF   PA c/s jt mobs     SMF   Rib jt mobs; clavicle inferior jt mob     SMF   Neck stretching     SMF   IASTM L SCM/scalenes/UT SMF SMF   SMF   Exercise Diary         UBE/T 4 retro; 8 mins walk-jog (1 min ea) 4 retro; 8 mins walk-jog (1 min ea)   4 retro; 8 mins walk-jog (1 min ea)   Upper trap stretch 30 sec x 3 30 sec x 3   30 sec x 3   Levator scap stretch 30 sec x 3 30 sec x 3   30 sec x 3   SCM stretch 30 sec x 3 30 sec x 3   30 sec x 3   Planks (prone; sidelying)  Body saw: 3x10; 20 sec x 3   20 sec x 3; 15 sec x 3   Plank weight pulls 6# 2x10  6# 2x10   6# 2x10   Supine Pec Flys     3# 3x10   Scap Retraction M: 27 M: 30   M: 30x   B S' Ext M: 27 M: 27   M: 30x   Scaption/ Abduction 4# 3x10 4# 3x10   4# 3x10   Wall Walks GTB to fatigue  GTB to fatigue   YTB to fatigue   Flex/Ext Combo BTB 2 rounds to fatigue  BTB 2 rounds to fatigue   BTB 2 rounds to fatigue   Pec stretch 30 sec x 3 30 sec x 3   30 sec x 3   Wall Pushup On Floor: 20x NV   On Floor: 2x10   3 way walk outs c cervical retraction GTB 10 ea NV   GTB 10 ea   "W" wall slides 2x10 2x10   2x10   Prone Y on PB 2# 3x10 c chin tucks 2# 3x10 c chin tucks   2# 3x10 c chin tucks   Prone T on PB 2# 3x10 c chin tucks 2# 3x10 c chin tucks   2# 3x10 c chin tucks   Prone I on PB 2# 3x10 c chin tucks 2# 3x10 c chin tucks   2# 3x10 c chin tucks   Foam Roller 2 mins  2 mins    2 mins   Body Blade 20 sec x 1 (flex, abd, diagonal)  20 sec x 1 (flex, abd, diagonal)   20 sec x 1 (flex, abd, diagonal)   Modalities        MH 10 mins  10 mins    10 mins    CP

## 2019-10-21 NOTE — PROGRESS NOTES
I'm waiting for Krystle Garcia to schedule the stress test so that I can schedule his next F/U  appt

## 2019-10-21 NOTE — LETTER
2019    Christinekarin Robert, [address] [employer] [employer] [address] [place] [id number]    Patient: Jan Noel  YOB: 1995   Date of Visit: 10/21/2019     Encounter Diagnosis     ICD-10-CM    1  Chest pain, unspecified type R07 9    2  Chronic neck pain M54 2     G89 29        Dear Dr Krystle Williamson:    Thank you for your recent referral of Jan Noel    Please review the attached evaluation summary from Alejandro's recent visit  Please verify that you agree with the plan of care by signing the attached order  If you have any questions or concerns, please do not hesitate to call  I sincerely appreciate the opportunity to share in the care of one of your patients and hope to have another opportunity to work with you in the near future  Sincerely,    Mauricio Gooden, PT      Referring Provider:      I certify that I have read the below Plan of Care and certify the need for these services furnished under this plan of treatment while under my care  DO [phone]  VIA Facsimile: [phone]          PT Evaluation (Progress Note)    Today's date: 2019  Patient name: Jan Noel  : 1995  MRN: [id number]  Referring provider: Balta Williamson DO  Dx:   Encounter Diagnosis     ICD-10-CM    1  Chronic neck pain M54 2     G89 29    2  Chest pain, unspecified type R07 9                   Assessment  Assessment details: Jan Noel  is a 24 y o  male presenting to outpatient physical therapy at [place] [address] with complaints of left sided neck pain and L chest pain that began approximately 2 5 years ago  Pt reports that he has overall a 50-60% improvement since I E however he continues to have the same level of pain  He has had injections in his neck approximately one month ago however without relief   He presents with decreased range of motion, decreased strength, limited flexibility, poor postural awareness, poor body mechanics, poor balance, decreased tolerance to activity and decreased functional mobility due to Chronic neck pain and chest pain, unspecified type  He was slowly been improving with tolerance to exercises and increasing cardiovascular endurance  Pt is scheduling to see a cardiopulmomary specialist   He would benefit from skilled PT services in order to address these deficits and reach maximum level of function   Thank you for the referral!  Impairments: abnormal muscle firing, abnormal muscle tone, abnormal or restricted ROM, activity intolerance, impaired physical strength, lacks appropriate home exercise program, pain with function, scapular dyskinesis, poor posture  and poor body mechanics    Symptom irritability: moderateUnderstanding of Dx/Px/POC: good   Prognosis: good    Goals  STG (in 4 weeks):  1  Pt will be independent with HEP - Progressing towards  2  Pt will report having at most a 2/10 pain - Progressing towards    LTGs (in 8 weeks)  1  Pt will report having a 75% improvement since I E - Progressing towards  2  Pt will be able to return to exercises and recreational activities with minimal modifications without increasing sxs - Progressing towards  3   Pt will be able to sit for prolonged periods without increasing pain - Progressing towards     Plan  Planned modality interventions: TENS, ultrasound and unattended electrical stimulation  Planned therapy interventions: therapeutic activities, therapeutic exercise, patient education, neuromuscular re-education, strengthening, stretching, home exercise program, manual therapy and joint mobilization  Frequency: 2x week  Duration in weeks: 8  Plan of Care beginning date: 10/21/19  Plan of Care expiration date: 11/18/19  Treatment plan discussed with: patient        Subjective Evaluation    History of Present Illness  Mechanism of injury: Pt is a 24year old male presents with L chest pain and L sided neck pain that began approximately 2 5 years ago secondary to unknown etiology  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 and started feeling "worn down" with progressive chest pain with head pain  He also noticing having shortness of breath and dizziness even when at rest  He has been treated for Lyme's disease and then when he was tested again recently, he was still positive so he was placed on another round of treatment   Pt is planning on seeing a pulmonologist      PMhx: history of head trauma at work, light sensitivity   Pain  Current pain ratin  At best pain ratin  At worst pain ratin  Quality: discomfort, pulling, sharp, pressure and dull ache - radiating to the R side of his head that can be sharp at times  Relieving factors: relaxation and rest  Aggravating factors: running, lifting, sitting and overhead activity (any more strenous activity, prolonged sitting)  Progression: pain is the same however functionally improving     Hand dominance: left      Diagnostic Tests  X-ray: normal  Treatments  Previous treatment: physical therapy  Patient Goals  Patient goals for therapy: decreased pain, increased motion, increased strength, independence with ADLs/IADLs, return to sport/leisure activities and return to work (Be able to sit for prolonged periods in order to attend college)          Objective       Flowsheet Rows      Most Recent Value   PT/OT G-Codes   Current Score 43   Projected Score  52        Objective: (* = pain on L neck)  AROM: standing    Cervical Flexion 45 degrees     Cervical Extension 70 degrees   Cervical Lat Flex (R) 43 degrees  (L) 43 degrees   Cervical Rotation (R) 90 degrees (L) 90 degrees    FIS: 75 degrees  EIS: 20 degrees  Rotation: (R) 4 cm (L) 4 cm  Lat Flex: (R) 25 degrees (L) 20 degrees    B shoulders AROM is WNL    STRENGTH:    R   L    Shoulder flexion  4/5   4/5  Shoulder abduction  4/5   4/5  Shoulder ER   4/5   4/5  Shoulder IR   4/5   4+/5  Upper trap   4/5   4/5  Mid trap   4/5   4/5  Lower trap   4/5   4-/5    Posture: Pt's sitting posture is rounded shoulders  He demonstrates scap winging  Mechanical assessment:  Repeated cervical flexion increases neck pain however repeated cervical extension caused more pain       Special Tests: (-) cervical distraction    Post Concussive syndrome TBA    Precautions: standard     HEP: B ER, Bilateral Horizontal ABD     Specialty Daily Treatment Diary       Manual 10/16 10/21   10/14   TPR/STM cervical SMF SMF   SMF   PA c/s jt mobs     SMF   Rib jt mobs; clavicle inferior jt mob     SMF   Neck stretching     SMF   IASTM L SCM/scalenes/UT SMF SMF   SMF   Exercise Diary         UBE/T 4 retro; 8 mins walk-jog (1 min ea) 4 retro; 8 mins walk-jog (1 min ea)   4 retro; 8 mins walk-jog (1 min ea)   Upper trap stretch 30 sec x 3 30 sec x 3   30 sec x 3   Levator scap stretch 30 sec x 3 30 sec x 3   30 sec x 3   SCM stretch 30 sec x 3 30 sec x 3   30 sec x 3   Planks (prone; sidelying)  Body saw: 3x10; 20 sec x 3   20 sec x 3; 15 sec x 3   Plank weight pulls 6# 2x10  6# 2x10   6# 2x10   Supine Pec Flys     3# 3x10   Scap Retraction M: 27 M: 27   M: 30x   B S' Ext M: 30 M: 30   M: 30x   Scaption/ Abduction 4# 3x10 4# 3x10   4# 3x10   Wall Walks GTB to fatigue  GTB to fatigue   YTB to fatigue   Flex/Ext Combo BTB 2 rounds to fatigue  BTB 2 rounds to fatigue   BTB 2 rounds to fatigue   Pec stretch 30 sec x 3 30 sec x 3   30 sec x 3   Wall Pushup On Floor: 20x NV   On Floor: 2x10   3 way walk outs c cervical retraction GTB 10 ea NV   GTB 10 ea   "W" wall slides 2x10 2x10   2x10   Prone Y on PB 2# 3x10 c chin tucks 2# 3x10 c chin tucks   2# 3x10 c chin tucks   Prone T on PB 2# 3x10 c chin tucks 2# 3x10 c chin tucks   2# 3x10 c chin tucks   Prone I on PB 2# 3x10 c chin tucks 2# 3x10 c chin tucks   2# 3x10 c chin tucks   Foam Roller 2 mins  2 mins    2 mins   Body Blade 20 sec x 1 (flex, abd, diagonal)  20 sec x 1 (flex, abd, diagonal)   20 sec x 1 (flex, abd, diagonal) Modalities         10 mins  10 mins    10 mins    CP

## 2019-10-21 NOTE — LETTER
2019    Ethyl Roche, [address] [facility] [address] [place] [id number]    Patient: Anival Altamirano  YOB: 1995   Date of Visit: 10/21/2019     Encounter Diagnosis     ICD-10-CM    1  Chest pain, unspecified type R07 9    2  Chronic neck pain M54 2     G89 29        Dear Dr Rkii Owusu:    Thank you for your recent referral of Anival Altamirano    Please review the attached evaluation summary from Alejandro's recent visit  Please verify that you agree with the plan of care by signing the attached order  If you have any questions or concerns, please do not hesitate to call  I sincerely appreciate the opportunity to share in the care of one of your patients and hope to have another opportunity to work with you in the near future  Sincerely,    Lior Romeo, PT      Referring Provider:      I certify that I have read the below Plan of Care and certify the need for these services furnished under this plan of treatment while under my care  DO [id number] [phone]  VIA Facsimile: [phone]          PT Evaluation (Progress Note)    Today's date: 2019  Patient name: Anival Altamirano  : 1995  MRN: [id number]  Referring provider: Morelia Villareal DO  Dx:   Encounter Diagnosis     ICD-10-CM    1  Chronic neck pain M54 2     G89 29    2  Chest pain, unspecified type R07 9                   Assessment  Assessment details: Anival Altamirano  is a 24 y o  male presenting to outpatient physical therapy at [place] [address] with complaints of left sided neck pain and L chest pain that began approximately 2 5 years ago  Pt reports that he has overall a 50-60% improvement since I E however he continues to have the same level of pain  He has had injections in his neck approximately one month ago however without relief   He presents with decreased range of motion, decreased strength, limited flexibility, poor postural awareness, poor body mechanics, poor balance, decreased tolerance to activity and decreased functional mobility due to Chronic neck pain and chest pain, unspecified type  He was slowly been improving with tolerance to exercises and increasing cardiovascular endurance  Pt is scheduling to see a cardiopulmomary specialist   He would benefit from skilled PT services in order to address these deficits and reach maximum level of function   Thank you for the referral!  Impairments: abnormal muscle firing, abnormal muscle tone, abnormal or restricted ROM, activity intolerance, impaired physical strength, lacks appropriate home exercise program, pain with function, scapular dyskinesis, poor posture  and poor body mechanics    Symptom irritability: moderateUnderstanding of Dx/Px/POC: good   Prognosis: good    Goals  STG (in 4 weeks):  1  Pt will be independent with HEP - Progressing towards  2  Pt will report having at most a 2/10 pain - Progressing towards    LTGs (in 8 weeks)  1  Pt will report having a 75% improvement since I E - Progressing towards  2  Pt will be able to return to exercises and recreational activities with minimal modifications without increasing sxs - Progressing towards  3   Pt will be able to sit for prolonged periods without increasing pain - Progressing towards     Plan  Planned modality interventions: TENS, ultrasound and unattended electrical stimulation  Planned therapy interventions: therapeutic activities, therapeutic exercise, patient education, neuromuscular re-education, strengthening, stretching, home exercise program, manual therapy and joint mobilization  Frequency: 2x week  Duration in weeks: 8  Plan of Care beginning date: 10/21/19  Plan of Care expiration date: 11/18/19  Treatment plan discussed with: patient        Subjective Evaluation    History of Present Illness  Mechanism of injury: Pt is a 24year old male presents with L chest pain and L sided neck pain that began approximately 2 5 years ago secondary to unknown etiology  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 and started feeling "worn down" with progressive chest pain with head pain  He also noticing having shortness of breath and dizziness even when at rest  He has been treated for Lyme's disease and then when he was tested again recently, he was still positive so he was placed on another round of treatment   Pt is planning on seeing a pulmonologist      PMhx: history of head trauma at work, light sensitivity   Pain  Current pain ratin  At best pain ratin  At worst pain ratin  Quality: discomfort, pulling, sharp, pressure and dull ache - radiating to the R side of his head that can be sharp at times  Relieving factors: relaxation and rest  Aggravating factors: running, lifting, sitting and overhead activity (any more strenous activity, prolonged sitting)  Progression: pain is the same however functionally improving     Hand dominance: left      Diagnostic Tests  X-ray: normal  Treatments  Previous treatment: physical therapy  Patient Goals  Patient goals for therapy: decreased pain, increased motion, increased strength, independence with ADLs/IADLs, return to sport/leisure activities and return to work (Be able to sit for prolonged periods in order to attend college)          Objective       Flowsheet Rows      Most Recent Value   PT/OT G-Codes   Current Score 43   Projected Score  52        Objective: (* = pain on L neck)  AROM: standing    Cervical Flexion 45 degrees     Cervical Extension 70 degrees   Cervical Lat Flex (R) 43 degrees  (L) 43 degrees   Cervical Rotation (R) 90 degrees (L) 90 degrees    FIS: 75 degrees  EIS: 20 degrees  Rotation: (R) 4 cm (L) 4 cm  Lat Flex: (R) 25 degrees (L) 20 degrees    B shoulders AROM is WNL    STRENGTH:    R   L    Shoulder flexion  4/5   4/5  Shoulder abduction  4/5   4/5  Shoulder ER   4/5   4/5  Shoulder IR   4/5   4+/5  Upper trap   4/5   4/5  Mid trap   4/5   4/5  Lower trap   4/5   4-/5    Posture: Pt's sitting posture is rounded shoulders  He demonstrates scap winging  Mechanical assessment:  Repeated cervical flexion increases neck pain however repeated cervical extension caused more pain       Special Tests: (-) cervical distraction    Post Concussive syndrome TBA    Precautions: standard     HEP: B ER, Bilateral Horizontal ABD     Specialty Daily Treatment Diary       Manual 10/16 10/21   10/14   TPR/STM cervical SMF SMF   SMF   PA c/s jt mobs     SMF   Rib jt mobs; clavicle inferior jt mob     SMF   Neck stretching     SMF   IASTM L SCM/scalenes/UT SMF SMF   SMF   Exercise Diary         UBE/T 4 retro; 8 mins walk-jog (1 min ea) 4 retro; 8 mins walk-jog (1 min ea)   4 retro; 8 mins walk-jog (1 min ea)   Upper trap stretch 30 sec x 3 30 sec x 3   30 sec x 3   Levator scap stretch 30 sec x 3 30 sec x 3   30 sec x 3   SCM stretch 30 sec x 3 30 sec x 3   30 sec x 3   Planks (prone; sidelying)  Body saw: 3x10; 20 sec x 3   20 sec x 3; 15 sec x 3   Plank weight pulls 6# 2x10  6# 2x10   6# 2x10   Supine Pec Flys     3# 3x10   Scap Retraction M: 27 M: 27   M: 30x   B S' Ext M: 30 M: 30   M: 30x   Scaption/ Abduction 4# 3x10 4# 3x10   4# 3x10   Wall Walks GTB to fatigue  GTB to fatigue   YTB to fatigue   Flex/Ext Combo BTB 2 rounds to fatigue  BTB 2 rounds to fatigue   BTB 2 rounds to fatigue   Pec stretch 30 sec x 3 30 sec x 3   30 sec x 3   Wall Pushup On Floor: 20x NV   On Floor: 2x10   3 way walk outs c cervical retraction GTB 10 ea NV   GTB 10 ea   "W" wall slides 2x10 2x10   2x10   Prone Y on PB 2# 3x10 c chin tucks 2# 3x10 c chin tucks   2# 3x10 c chin tucks   Prone T on PB 2# 3x10 c chin tucks 2# 3x10 c chin tucks   2# 3x10 c chin tucks   Prone I on PB 2# 3x10 c chin tucks 2# 3x10 c chin tucks   2# 3x10 c chin tucks   Foam Roller 2 mins  2 mins    2 mins   Body Blade 20 sec x 1 (flex, abd, diagonal)  20 sec x 1 (flex, abd, diagonal)   20 sec x 1 (flex, abd, diagonal) Modalities         10 mins  10 mins    10 mins    CP

## 2019-10-22 ENCOUNTER — PROCEDURE VISIT (OUTPATIENT)
Dept: NEUROLOGY | Facility: CLINIC | Age: 24
End: 2019-10-22
Payer: COMMERCIAL

## 2019-10-22 ENCOUNTER — TELEPHONE (OUTPATIENT)
Dept: FAMILY MEDICINE CLINIC | Facility: HOSPITAL | Age: 24
End: 2019-10-22

## 2019-10-22 VITALS — SYSTOLIC BLOOD PRESSURE: 120 MMHG | TEMPERATURE: 97.7 F | DIASTOLIC BLOOD PRESSURE: 70 MMHG

## 2019-10-22 DIAGNOSIS — G24.3 CERVICAL DYSTONIA: Primary | ICD-10-CM

## 2019-10-22 PROCEDURE — 64616 CHEMODENERV MUSC NECK DYSTON: CPT | Performed by: PSYCHIATRY & NEUROLOGY

## 2019-10-22 NOTE — TELEPHONE ENCOUNTER
Noel from [facility] physical therapy called stating she had been working with pt for 2-3 months no and functionally patient is doing better  But patient is still complaining of pain, Ok Janis thinks he has some under lying depression/anxiety  Also states he has chest pain but noel said its not palpable chest pain  Ok Janis just wanted this documented  so that Dr Aishwarya Osorio knows what is going on    [address]

## 2019-10-22 NOTE — PROGRESS NOTES
Adam Diaz  returned today to the Botox clinic in the [address] [facility] at the [address] [place] [address],[address] for Neuroscience  As you know, the patient is a 24 y o  male who is receiving Botox injections for cervical dystonia  The effect of the previous injection was none     Side effects included none           Medications:      Current Outpatient Medications on File Prior to Visit   Medication Sig Dispense Refill    albuterol (VENTOLIN HFA) 90 mcg/act inhaler Inhale 2 puffs every 6 (six) hours as needed for wheezing 18 g 1    mometasone (ELOCON) 0 1 % cream Apply topically daily 45 g 5    multivitamin (THERAGRAN) TABS Take 1 tablet by mouth daily      Probiotic Product (PROBIOTIC-10 PO) Take by mouth      rizatriptan (MAXALT-MLT) 10 MG disintegrating tablet Take 1 tab at migraine onset, can repeat once in 2 hours  Max 2 tabs in 24 hours  Max 2 days a week  9 tablet 1    verapamil (CALAN) 40 mg tablet Take half tab qhs x 1 week, then 1 tab qhs x 1 week, then half tab AM and 1 tab nightly, then 1 tab BID  60 tablet 1     Current Facility-Administered Medications on File Prior to Visit   Medication Dose Route Frequency Provider Last Rate Last Dose    AbobotulinumtoxinA SOLR 300 Units  300 Units Intramuscular Q3 Months Sanaz Tang MD   300 Units at 07/16/19 1711         Focused Neurologic examination:     Slight hypertrophy in the left SCM and left shoulder trapezius       Injection details:     After sterile preparation of the skin, a total of 300 units of Botox were injected without EMG guidance  Each 300 units was diluted in 0 6 cc of PFNS   0 units were discarded as waste  The injections were done as follows into the:  LEFT Sternocleidomastoid: 25 units (0 05) x2  LEFT Splenious capitis: 50 units (0 1ml)  LEFT Trapezius, shoulder portion: 50 units (0 1ml) x3      The procedure was well tolerated without immediate complications   The patient will return in 12 weeks for further evaluation and treatment  Next time we may consider:   If the patient sees no impact from this round of injections we should attempt to get insurance approval again for Botox

## 2019-10-23 ENCOUNTER — OFFICE VISIT (OUTPATIENT)
Dept: PHYSICAL THERAPY | Facility: CLINIC | Age: 24
End: 2019-10-23
Payer: COMMERCIAL

## 2019-10-23 DIAGNOSIS — M54.2 CHRONIC NECK PAIN: ICD-10-CM

## 2019-10-23 DIAGNOSIS — G89.29 CHRONIC NECK PAIN: ICD-10-CM

## 2019-10-23 DIAGNOSIS — R07.9 CHEST PAIN, UNSPECIFIED TYPE: Primary | ICD-10-CM

## 2019-10-23 PROCEDURE — 97110 THERAPEUTIC EXERCISES: CPT | Performed by: PHYSICAL THERAPIST

## 2019-10-23 PROCEDURE — 97112 NEUROMUSCULAR REEDUCATION: CPT | Performed by: PHYSICAL THERAPIST

## 2019-10-23 NOTE — PROGRESS NOTES
Daily Note     Today's date: 10/23/2019  Patient name: Laurie Robertson  : 1995  MRN: [id number]  Referring provider: Ella Quintana DO  Dx:   Encounter Diagnosis     ICD-10-CM    1  Chest pain, unspecified type R07 9    2  Chronic neck pain M54 2     G89 29                   Subjective: Pt reports that he had a stronger botox injection which he thinks has helped his sxs  Objective: See treatment diary below      Assessment: Tolerated treatment well; initiated POC with UBE and treadmill  He reports having less pain today in his neck throughout exercises however continues to have breathing discomfort  He has his cardio-pulm appointment scheduled for   Patient demonstrated fatigue post treatment and would benefit from continued PT      Plan: Continue per plan of care        Precautions: standard     HEP: convergence, Smooth pursuits all directions    Specialty Daily Treatment Diary       Manual 10/16 10/23   10/14   TPR/STM cervical SMF    SMF   PA c/s jt mobs     SMF   Rib jt mobs; clavicle inferior jt mob     SMF   Neck stretching     SMF   IASTM L SCM/scalenes/UT SMF    SMF   Exercise Diary         UBE/T 4 retro; 8 mins walk-jog (1 min ea) 4 retro; 8 mins walk-jog (1 minute ea)   4 retro; 8 mins walk-jog (1 min ea)   Upper trap stretch 30 sec x 3 30 sec x 3   30 sec x 3   Levator scap stretch 30 sec x 3 30 sec x 3   30 sec x 3   SCM stretch 30 sec x 3 30 sec x 3   30 sec x 3   Planks (prone; sidelying)  NV   20 sec x 3; 15 sec x 3   Plank weight pulls 6# 2x10  6# 2x10   6# 2x10   Supine Pec Flys  6# 3x10   3# 3x10   Scap Retraction M: 27 M: 27   M: 30x   B S' Ext M: 30 M: 30x   M: 30x   Scaption/ Abduction 4# 3x10 5# 3x10   4# 3x10   Wall Walks GTB to fatigue  GTB to fatigue    YTB to fatigue   Flex/Ext Combo BTB 2 rounds to fatigue  BTB 2 rounds to fatigue   BTB 2 rounds to fatigue   Pec stretch 30 sec x 3 30 sec x 3   30 sec x 3   Wall Pushup On Floor: 20x On Floor: 20x   On Floor: 2x10   3 way walk outs c cervical retraction GTB 10 ea GTB 10 ea   GTB 10 ea   "W" wall slides 2x10 2x10   2x10   Prone Y on PB 2# 3x10 c chin tucks 2# 3x10 c chin tucks   2# 3x10 c chin tucks   Prone T on PB 2# 3x10 c chin tucks 2# 3x10 c chin tucks    2# 3x10 c chin tucks   Prone I on PB 2# 3x10 c chin tucks 2# 3x10 c chin tucks    2# 3x10 c chin tucks   Foam Roller 2 mins  2 mins    2 mins   Body Blade 20 sec x 1 (flex, abd, diagonal)  20 sec x 1 (flex, abd, diagonal)   20 sec x 1 (flex, abd, diagonal)   Modalities        MH 10 mins  10 mins    10 mins    CP

## 2019-10-25 NOTE — PROGRESS NOTES
Called patient to schedule a follow up with Dr Jeffrey Terry in [facility] after his pulmonary stress test in November

## 2019-10-28 ENCOUNTER — OFFICE VISIT (OUTPATIENT)
Dept: PHYSICAL THERAPY | Facility: CLINIC | Age: 24
End: 2019-10-28
Payer: COMMERCIAL

## 2019-10-28 DIAGNOSIS — G89.29 CHRONIC NECK PAIN: ICD-10-CM

## 2019-10-28 DIAGNOSIS — M54.2 CHRONIC NECK PAIN: ICD-10-CM

## 2019-10-28 DIAGNOSIS — R07.9 CHEST PAIN, UNSPECIFIED TYPE: Primary | ICD-10-CM

## 2019-10-28 PROCEDURE — 97112 NEUROMUSCULAR REEDUCATION: CPT | Performed by: PHYSICAL THERAPIST

## 2019-10-28 PROCEDURE — 97140 MANUAL THERAPY 1/> REGIONS: CPT | Performed by: PHYSICAL THERAPIST

## 2019-10-28 PROCEDURE — 97110 THERAPEUTIC EXERCISES: CPT | Performed by: PHYSICAL THERAPIST

## 2019-10-28 NOTE — PROGRESS NOTES
Daily Note     Today's date: 10/28/2019  Patient name: Rock Ndiaye  : 1995  MRN: [id number]  Referring provider: Karla Paz DO  Dx:   Encounter Diagnosis     ICD-10-CM    1  Chest pain, unspecified type R07 9    2  Chronic neck pain M54 2     G89 29                   Subjective: Pt reports that he feels okay today however over the weekend       Objective: See treatment diary below      Assessment: Tolerated treatment well; he requires occasional vcs on proper sequencing with exercises  He reports having some chest pain throughout session  Pt reports that he had "white stool" with mucous over the weekend supplemented with some chest pain; therapist encouraged pt to contact MD  He continues to report feeling challenged throughout program  He declines modalities today  Patient demonstrated fatigue post treatment and would benefit from continued PT      Plan: Continue per plan of care        Precautions: standard     HEP: convergence, Smooth pursuits all directions    Specialty Daily Treatment Diary       Manual 10/16 10/23 10/28  10/14   TPR/STM cervical SMF    SMF   PA c/s jt mobs     SMF   Rib jt mobs; clavicle inferior jt mob     SMF   Neck stretching     SMF   IASTM L SCM/scalenes/UT SMF    SMF   Exercise Diary         UBE/T 4 retro; 8 mins walk-jog (1 min ea) 4 retro; 8 mins walk-jog (1 minute ea) 4 retro; 8 mins walk-jog (1 minute ea)  4 retro; 8 mins walk-jog (1 min ea)   Upper trap stretch 30 sec x 3 30 sec x 3 30 sec x 3  30 sec x 3   Levator scap stretch 30 sec x 3 30 sec x 3 30 sec x 3  30 sec x 3   SCM stretch 30 sec x 3 30 sec x 3 30 sec x 3  30 sec x 3   Planks (prone; sidelying)  NV 10x body saw; 20se x 3  20 sec x 3; 15 sec x 3   Plank weight pulls 6# 2x10  6# 2x10 6# 2x10  6# 2x10   Supine Pec Flys  6# 3x10 6# 3x10  3# 3x10   Scap Retraction M: 27 M: 27 M: 30x  M: 30x   B S' Ext M: 30 M: 30x M: 30x  M: 30x   Scaption/ Abduction 4# 3x10 5# 3x10 5# 3x10  4# 3x10   Wall Walks GTB to fatigue  GTB to fatigue  GTB to fatigue   YTB to fatigue   Flex/Ext Combo BTB 2 rounds to fatigue  BTB 2 rounds to fatigue BTB 2 rounds to fatigue   BTB 2 rounds to fatigue   Pec stretch 30 sec x 3 30 sec x 3 30 sec x 3  30 sec x 3   Wall Pushup On Floor: 20x On Floor: 20x On Floor: 20x  On Floor: 2x10   3 way walk outs c cervical retraction GTB 10 ea GTB 10 ea GTB 10 ea  GTB 10 ea   "W" wall slides 2x10 2x10 2x10  2x10   Prone Y on PB 2# 3x10 c chin tucks 2# 3x10 c chin tucks 2# 3x10 c chin tucks  2# 3x10 c chin tucks   Prone T on PB 2# 3x10 c chin tucks 2# 3x10 c chin tucks  2# 3x10 c chin tucks   2# 3x10 c chin tucks   Prone I on PB 2# 3x10 c chin tucks 2# 3x10 c chin tucks  2# 3x10 c chin tucks   2# 3x10 c chin tucks   Foam Roller 2 mins  2 mins  2 mins   2 mins   Body Blade 20 sec x 1 (flex, abd, diagonal)  20 sec x 1 (flex, abd, diagonal) 20 sec x 1 (flex, abd, diagonal)   20 sec x 1 (flex, abd, diagonal)   Modalities        MH 10 mins  10 mins  dec  10 mins    CP

## 2019-10-30 ENCOUNTER — OFFICE VISIT (OUTPATIENT)
Dept: PHYSICAL THERAPY | Facility: CLINIC | Age: 24
End: 2019-10-30
Payer: COMMERCIAL

## 2019-10-30 DIAGNOSIS — G89.29 CHRONIC NECK PAIN: ICD-10-CM

## 2019-10-30 DIAGNOSIS — R07.9 CHEST PAIN, UNSPECIFIED TYPE: Primary | ICD-10-CM

## 2019-10-30 DIAGNOSIS — M54.2 CHRONIC NECK PAIN: ICD-10-CM

## 2019-10-30 PROCEDURE — 97110 THERAPEUTIC EXERCISES: CPT

## 2019-10-30 PROCEDURE — 97112 NEUROMUSCULAR REEDUCATION: CPT

## 2019-10-30 NOTE — PROGRESS NOTES
Daily Note     Today's date: 10/30/2019  Patient name: Lubna Wilhelm  : 1995  MRN: [id number]  Referring provider: Pricilla Gonzalez DO  Dx:   Encounter Diagnosis     ICD-10-CM    1  Chest pain, unspecified type R07 9    2  Chronic neck pain M54 2     G89 29        Subjective: Pt reports he woke up with neck pain radiating in his head and mild pain down into chest        Objective: See treatment diary below      Assessment: Pt tolerated treatment well; Pt appropriately challenged with current exercise POC  Pt noted no increased chest pain or head and neck pain throughout entire session  Pt still requires verbal cueing for proper sequencing of exercises  IASTM preformed to L UT and SCM to relieve tightness  Pt noted improved ROM post manuals  Pt's subjective reports of pain in chest and head does not match with performance in PT, able to complete lengthy/challenging program without difficulty  When asked about current limitations due to subject complaints of pain pt reports no limitations at work or home  He states he is able to lift heavy items at work, [employer] limited by pain but overall can complete all tasks  Continue to monitor pt's sx's with goal of transitioning to HEP  Plan: Continue per plan of care      Pt IEP 9:00-9:40  Pt 1:1 with PTA JW 9:40-10:30     Precautions: standard     HEP: convergence, Smooth pursuits all directions    Specialty Daily Treatment Diary       Manual 10/16 10/23 10/28 10/30 10/14   TPR/STM cervical SMF    SMF   PA c/s jt mobs     SMF   Rib jt mobs; clavicle inferior jt mob     SMF   Neck stretching     SMF   IASTM L SCM/scalenes/UT SMF   JW SMF   Exercise Diary         UBE/T 4 retro; 8 mins walk-jog (1 min ea) 4 retro; 8 mins walk-jog (1 minute ea) 4 retro; 8 mins walk-jog (1 minute ea) 4 retro; 8 mins walk-jog (1 minute ea) 4 retro; 8 mins walk-jog (1 min ea)   Upper trap stretch 30 sec x 3 30 sec x 3 30 sec x 3 30 sec x 3 30 sec x 3   Levator scap stretch 30 sec x 3 30 sec x 3 30 sec x 3 30 sec x 3 30 sec x 3   SCM stretch 30 sec x 3 30 sec x 3 30 sec x 3 30 sec x 3 30 sec x 3   Planks (prone; sidelying)  NV 10x body saw; 20se x 3 10x body saw; 20se x 3 20 sec x 3; 15 sec x 3   Plank weight pulls 6# 2x10  6# 2x10 6# 2x10 6# 2x10 6# 2x10   Supine Pec Flys  6# 3x10 6# 3x10 6# 3x10 3# 3x10   Scap Retraction M: 27 M: 27 M: 30x M: 30x  M: 30x   B S' Ext M: 30 M: 30x M: 30x M: 30x M: 30x   Scaption/ Abduction 4# 3x10 5# 3x10 5# 3x10 5# 3x10 4# 3x10   Wall Walks GTB to fatigue  GTB to fatigue  GTB to fatigue   YTB to fatigue   Flex/Ext Combo BTB 2 rounds to fatigue  BTB 2 rounds to fatigue BTB 2 rounds to fatigue  BTB 2 rounds to fatigue  BTB 2 rounds to fatigue   Pec stretch 30 sec x 3 30 sec x 3 30 sec x 3  30 sec x 3   Wall Pushup On Floor: 20x On Floor: 20x On Floor: 20x On floor: 20x On Floor: 2x10   3 way walk outs c cervical retraction GTB 10 ea GTB 10 ea GTB 10 ea GTB 10 ea GTB 10 ea   "W" wall slides 2x10 2x10 2x10 2x10 2x10   Prone Y on PB 2# 3x10 c chin tucks 2# 3x10 c chin tucks 2# 3x10 c chin tucks 3# 3x10 c chin tucks  2# 3x10 c chin tucks   Prone T on PB 2# 3x10 c chin tucks 2# 3x10 c chin tucks  2# 3x10 c chin tucks  3# 3x10 c chin tucks  2# 3x10 c chin tucks   Prone I on PB 2# 3x10 c chin tucks 2# 3x10 c chin tucks  2# 3x10 c chin tucks  3# 3x10 c chin tucks  2# 3x10 c chin tucks   Foam Roller 2 mins  2 mins  2 mins  2 mins 2 mins   Body Blade 20 sec x 1 (flex, abd, diagonal)  20 sec x 1 (flex, abd, diagonal) 20 sec x 1 (flex, abd, diagonal)  20 sec x 1 (flex, abd, diagonal)  20 sec x 1 (flex, abd, diagonal)   Modalities        MH 10 mins  10 mins  dec dec 10 mins    CP

## 2019-11-04 ENCOUNTER — APPOINTMENT (OUTPATIENT)
Dept: PHYSICAL THERAPY | Facility: CLINIC | Age: 24
End: 2019-11-04
Payer: COMMERCIAL

## 2019-11-06 ENCOUNTER — OFFICE VISIT (OUTPATIENT)
Dept: PHYSICAL THERAPY | Facility: CLINIC | Age: 24
End: 2019-11-06
Payer: COMMERCIAL

## 2019-11-06 DIAGNOSIS — G89.29 CHRONIC NECK PAIN: ICD-10-CM

## 2019-11-06 DIAGNOSIS — R07.9 CHEST PAIN, UNSPECIFIED TYPE: Primary | ICD-10-CM

## 2019-11-06 DIAGNOSIS — M54.2 CHRONIC NECK PAIN: ICD-10-CM

## 2019-11-06 PROCEDURE — 97140 MANUAL THERAPY 1/> REGIONS: CPT | Performed by: PHYSICAL THERAPIST

## 2019-11-06 PROCEDURE — 97530 THERAPEUTIC ACTIVITIES: CPT | Performed by: PHYSICAL THERAPIST

## 2019-11-06 PROCEDURE — 97112 NEUROMUSCULAR REEDUCATION: CPT | Performed by: PHYSICAL THERAPIST

## 2019-11-06 PROCEDURE — 97110 THERAPEUTIC EXERCISES: CPT | Performed by: PHYSICAL THERAPIST

## 2019-11-06 NOTE — PROGRESS NOTES
Daily Note     Today's date: 2019  Patient name: Melissa Yarbrough  : 1995  MRN: [id number]  Referring provider: Pj Price DO  Dx:   Encounter Diagnosis     ICD-10-CM    1  Chest pain, unspecified type R07 9    2  Chronic neck pain M54 2     G89 29                   Subjective: He reports that he had a really good weekend but then  night he started having increased pain and then the next day and in to Tuesday he had chest pain, neck pain, and head pain  He did perform neck stretches which seemed to help  He has appointment with cardiopulm next week  Objective: See treatment diary below      Assessment: Tolerated treatment well; initiated POC with UBE and treadmill  Encouraged pt to increase time spent with running and less with walking  Occasional vcs were provided on proper sequencing with exercises; added plank with cervical retraction using theraband  Patient demonstrated fatigue post treatment and would benefit from continued PT      Plan: Continue per plan of care        Precautions: standard     HEP: convergence, Smooth pursuits all directions    Specialty Daily Treatment Diary       Manual 10/16 10/23 10/28 10/30 11/6   TPR/STM cervical SMF    SMF TPR L pec f/b PROM all planes   PA c/s jt mobs        Rib jt mobs; clavicle inferior jt mob        Neck stretching        IASTM L SCM/scalenes/UT SMF   JW SMF   Exercise Diary         UBE/T/E 4 retro; 8 mins walk-jog (1 min ea) 4 retro; 8 mins walk-jog (1 minute ea) 4 retro; 8 mins walk-jog (1 minute ea) 4 retro; 8 mins walk-jog (1 minute ea) 4 retro; 10 mins walk-jog (1 min ea); 3 mins Elliptical    Upper trap stretch 30 sec x 3 30 sec x 3 30 sec x 3 30 sec x 3 30 sec x 3   Levator scap stretch 30 sec x 3 30 sec x 3 30 sec x 3 30 sec x 3 30 sec x 3   SCM stretch 30 sec x 3 30 sec x 3 30 sec x 3 30 sec x 3 30 sec x 3   Planks (prone; sidelying)  NV 10x body saw; 20se x 3 10x body saw; 20se x 3 10x body saw; 20 sec x 3   Plank weight pulls 6# 2x10  6# 2x10 6# 2x10 6# 2x10 6# 2x10   Supine Pec Flys  6# 3x10 6# 3x10 6# 3x10 6# 3x10   Scap Retraction M: 27 M: 27 M: 30x M: 30x  M: 27   B S' Ext M: 30 M: 30x M: 30x M: 30x M: 30   Scaption/ Abduction 4# 3x10 5# 3x10 5# 3x10 5# 3x10 5# 3x10   Wall Walks GTB to fatigue  GTB to fatigue  GTB to fatigue   GTB to fatigue    Flex/Ext Combo BTB 2 rounds to fatigue  BTB 2 rounds to fatigue BTB 2 rounds to fatigue  BTB 2 rounds to fatigue  BTB 2 rounds   Pec stretch 30 sec x 3 30 sec x 3 30 sec x 3  30 sec x 3   Wall Pushup On Floor: 20x On Floor: 20x On Floor: 20x On floor: 20x On floor: 20x   3 way walk outs c cervical retraction GTB 10 ea GTB 10 ea GTB 10 ea GTB 10 ea Plank c cervical retraction BTB to fatigue   "W" wall slides 2x10 2x10 2x10 2x10 2x10   Prone Y on PB 2# 3x10 c chin tucks 2# 3x10 c chin tucks 2# 3x10 c chin tucks 3# 3x10 c chin tucks  3# 3x10 c chin tucks   Prone T on PB 2# 3x10 c chin tucks 2# 3x10 c chin tucks  2# 3x10 c chin tucks  3# 3x10 c chin tucks  3# 3x10 c chin tucks   Prone I on PB 2# 3x10 c chin tucks 2# 3x10 c chin tucks  2# 3x10 c chin tucks  3# 3x10 c chin tucks  3# 3x10 c chin tucks   Foam Roller 2 mins  2 mins  2 mins  2 mins    Body Blade 20 sec x 1 (flex, abd, diagonal)  20 sec x 1 (flex, abd, diagonal) 20 sec x 1 (flex, abd, diagonal)  20 sec x 1 (flex, abd, diagonal)  20 sec x 1 (flex, abd, diagonal)   Modalities        MH 10 mins  10 mins  dec dec    CP

## 2019-11-12 ENCOUNTER — OFFICE VISIT (OUTPATIENT)
Dept: PHYSICAL THERAPY | Facility: CLINIC | Age: 24
End: 2019-11-12
Payer: COMMERCIAL

## 2019-11-12 ENCOUNTER — TELEPHONE (OUTPATIENT)
Dept: NEUROLOGY | Facility: CLINIC | Age: 24
End: 2019-11-12

## 2019-11-12 DIAGNOSIS — G89.29 CHRONIC NECK PAIN: ICD-10-CM

## 2019-11-12 DIAGNOSIS — R07.9 CHEST PAIN, UNSPECIFIED TYPE: Primary | ICD-10-CM

## 2019-11-12 DIAGNOSIS — M54.2 CHRONIC NECK PAIN: ICD-10-CM

## 2019-11-12 PROCEDURE — 97010 HOT OR COLD PACKS THERAPY: CPT | Performed by: PHYSICAL THERAPIST

## 2019-11-12 PROCEDURE — 97110 THERAPEUTIC EXERCISES: CPT | Performed by: PHYSICAL THERAPIST

## 2019-11-12 PROCEDURE — 97112 NEUROMUSCULAR REEDUCATION: CPT | Performed by: PHYSICAL THERAPIST

## 2019-11-12 PROCEDURE — 97140 MANUAL THERAPY 1/> REGIONS: CPT | Performed by: PHYSICAL THERAPIST

## 2019-11-12 NOTE — TELEPHONE ENCOUNTER
pt called and states that he has been working part time and it was recommended by you and other doctors that he try to change his line of work  he is   trying to go back to school  he was denied financial aid and they are asking for him to submit additional info in a letter as to why he has been only working part for the past 2 years and what we have been treating him for  he states that in jan 2018 [address] advise that he work part time  as he has a very physically demanding job where he has to lift over 100lbs  pt is currently working 12 hours a week  ok ot generate letter?  he would like to pick this letter up    [phone] to leave a detailed message

## 2019-11-12 NOTE — PROGRESS NOTES
Daily Note     Today's date: 2019  Patient name: Deepti Terrell  : 1995  MRN: [id number]  Referring provider: Rajeev Archuleta DO  Dx:   Encounter Diagnosis     ICD-10-CM    1  Chest pain, unspecified type R07 9    2  Chronic neck pain M54 2     G89 29                   Subjective: Pt reports that PT has helped him however he continues to have chest pain secondary to unknown etiology  Objective: See treatment diary below      Assessment: Tolerated treatment well; initiated POC with UBE and treadmill jog-run  He denies having pain throughout session with occasional chest pain  Therapist discussed avoiding using accessory muscles with deep breathing to prevent strain on his SCM  Encouraged pt to use heat heat at home and perform neck stretches  Patient demonstrated fatigue post treatment and would benefit from continued PT      Plan: Continue per plan of care        Precautions: standard     HEP: convergence, Smooth pursuits all directions    Specialty Daily Treatment Diary       Manual 11/12   10/30 11/6   TPR/STM cervical SMF    SMF TPR L pec f/b PROM all planes   PA c/s jt mobs        Rib jt mobs; clavicle inferior jt mob        Neck stretching        IASTM L SCM/scalenes/UT SMF   JW SMF   Exercise Diary         UBE/T/E 4 retro; 10 mins walk-jog (1min ea);   4 retro; 8 mins walk-jog (1 minute ea) 4 retro; 10 mins walk-jog (1 min ea); 3 mins Elliptical    Upper trap stretch 30 sec x 3    30 sec x 3 30 sec x 3   Levator scap stretch 30 sec x 3   30 sec x 3 30 sec x 3   SCM stretch 30 sec x 3   30 sec x 3 30 sec x 3   Planks (prone; sidelying) 3x10 body saw; 20 sec x 3   10x body saw; 20se x 3 10x body saw; 20 sec x 3   Plank weight pulls 6# 2x10   6# 2x10 6# 2x10   Supine Pec Flys 6# 3x10   6# 3x10 6# 3x10   Scap Retraction M: 30x   M: 30x  M: 30   B S' Ext M: 30   M: 30x M: 30   Scaption/ Abduction 5# 3x10   5# 3x10 5# 3x10   Wall Walks GTB to fatigue     GTB to fatigue    Flex/Ext Combo BTB 2 rounds    BTB 2 rounds to fatigue  BTB 2 rounds   Pec stretch 30 sec x 3    30 sec x 3   Wall Pushup    On floor: 20x On floor: 20x   Plank c cervical retraction BTB to fatigue    GTB 10 ea Plank c cervical retraction BTB to fatigue   "W" wall slides 2x10    2x10 2x10   Prone Y on PB 3# 3x10 c chin tucks   3# 3x10 c chin tucks  3# 3x10 c chin tucks   Prone T on PB 3# 3x10 c chin tucks    3# 3x10 c chin tucks  3# 3x10 c chin tucks   Prone I on PB 3# 3x10 c chin tucks    3# 3x10 c chin tucks  3# 3x10 c chin tucks   Foam Roller 2 mins    2 mins    Body Blade 20 sec x 1 (flex, abd, diagonal)   20 sec x 1 (flex, abd, diagonal)  20 sec x 1 (flex, abd, diagonal)   Modalities        MH 10 mins    dec    CP

## 2019-11-12 NOTE — LETTER
November 13, 2019       Patient: Mychal Quintana  YOB: 1995     To Whom It May Concern:    Naif Mobley is under my professional care for his neurological condition  He was last seen in our office September 12, 2019  Charisse Harvey has been advised to work part-time as he has a very physically demanding job  He is currently working 12 hours per week  He follows with us in the office regularly for continued treatment and follow up  If you have any further questions or concerns, please feel free to reach out to our office at [phone]      Sincerely,    Wily Montesinos, DO

## 2019-11-13 NOTE — TELEPHONE ENCOUNTER
Letter generated  Notified patient  He will be picking up in [facility] office  Spoke to Nirali Louise and made her aware

## 2019-11-14 ENCOUNTER — HOSPITAL ENCOUNTER (OUTPATIENT)
Dept: PULMONOLOGY | Facility: HOSPITAL | Age: 24
Discharge: HOME | End: 2019-11-14
Attending: INTERNAL MEDICINE

## 2019-11-14 ENCOUNTER — HOSPITAL ENCOUNTER (EMERGENCY)
Facility: HOSPITAL | Age: 24
Discharge: HOME | End: 2019-11-14
Attending: EMERGENCY MEDICINE
Payer: COMMERCIAL

## 2019-11-14 VITALS
WEIGHT: 137 LBS | SYSTOLIC BLOOD PRESSURE: 123 MMHG | DIASTOLIC BLOOD PRESSURE: 60 MMHG | RESPIRATION RATE: 16 BRPM | HEART RATE: 82 BPM | OXYGEN SATURATION: 100 % | TEMPERATURE: 98.7 F | BODY MASS INDEX: 22.11 KG/M2

## 2019-11-14 DIAGNOSIS — R55 NEAR SYNCOPE: Primary | ICD-10-CM

## 2019-11-14 DIAGNOSIS — R06.02 SOB (SHORTNESS OF BREATH): ICD-10-CM

## 2019-11-14 PROCEDURE — 99284 EMERGENCY DEPT VISIT MOD MDM: CPT

## 2019-11-14 PROCEDURE — 99283 EMERGENCY DEPT VISIT LOW MDM: CPT | Performed by: EMERGENCY MEDICINE

## 2019-11-14 PROCEDURE — 93005 ELECTROCARDIOGRAM TRACING: CPT

## 2019-11-14 NOTE — ED ATTENDING ATTESTATION
11/14/2019  IAntonio DO, saw and evaluated the patient  I have discussed the patient with the resident/non-physician practitioner and agree with the resident's/non-physician practitioner's findings, Plan of Care, and MDM as documented in the resident's/non-physician practitioner's note, except where noted  All available labs and Radiology studies were reviewed  I was present for key portions of any procedure(s) performed by the resident/non-physician practitioner and I was immediately available to provide assistance  At this point I agree with the current assessment done in the Emergency Department  I have conducted an independent evaluation of this patient a history and physical is as follows:    78-year-old male patient, today was having an ABG performed as part of a pulmonary stress test when he began to feel very lightheaded, thought things would black out, he almost passed out but still hear people talking  Then became normal   He now says he feels fine and has no complaints  He does have a baseline right-sided headache which has been present for 3 years, he said he has undergone multiple evaluations and notes been able to determine why he has that  During this episode he did not have any chest pain or palpitations  He says he has gotten fairly lightheaded and nauseous during previous blood sticks and when seeing blood sometimes but never had a reaction quite this severe  Patient denies any prolonged travel history, no recent long travel, no immobilizations, or hospitalizations      General:  Patient is well-appearing  Head:  Atraumatic  Eyes:  Conjunctiva pink, Extraocular muscle intact, PERRL  ENT:  Mucous membranes are moist  Neck:  Supple  Cardiac:  S1-S2, without murmurs  Lungs:  Clear to auscultation bilaterally  Abdomen:  Soft, nontender, normal bowel sounds, no CVA tenderness, no tympany, no rigidity, no guarding  Extremities:  Normal range of motion, no pedal edema or calf asymmetry  Neurologic:  Awake, fluent speech, normal comprehension  AAOx3  Cranial nerves 2-12 are intact, strength is 5/5 in the bilateral upper & lower extremities, no slurred speech, no facial droop, no deficit on finger-to-nose testing, no pronator drift  Sensation to light touch is equal and symmetric throughout the whole body  Skin:  Pink warm and dry, no rash  Psychiatric:  Alert, pleasant, cooperative            ED Course     EKG interpreted by me, sinus rhythm, rate of 81, right axis deviation, septal Q-wave, no ST segment elevation or depression, no delta wave, normal intervals, no acute change from January 28, 2017    Suspect the patient had a vasovagal episode  Per history he tells me does not get lightheaded when he stands up quickly, do not believe additional lab testing is indicated      Critical Care Time  Procedures

## 2019-11-14 NOTE — ED PROVIDER NOTES
History  Chief Complaint   Patient presents with    Syncope     Pt about to get stress test and was being stuck for ABG when he 'blacked out'  pt c/o his normal headache and feeling weak, no other complaints       Syncope   Duration:  1 minute  Timing:  Constant  Progression:  Resolved  Context: blood draw    Associated symptoms: no chest pain, no confusion, no fever, no nausea, no shortness of breath, no vomiting and no weakness        Prior to Admission Medications   Prescriptions Last Dose Informant Patient Reported? Taking? Probiotic Product (PROBIOTIC-10 PO)  Self Yes No   Sig: Take by mouth   albuterol (VENTOLIN HFA) 90 mcg/act inhaler  Self No No   Sig: Inhale 2 puffs every 6 (six) hours as needed for wheezing   mometasone (ELOCON) 0 1 % cream  Self No No   Sig: Apply topically daily   multivitamin (THERAGRAN) TABS  Self Yes No   Sig: Take 1 tablet by mouth daily   rizatriptan (MAXALT-MLT) 10 MG disintegrating tablet  Self No No   Sig: Take 1 tab at migraine onset, can repeat once in 2 hours  Max 2 tabs in 24 hours  Max 2 days a week  verapamil (CALAN) 40 mg tablet  Self No No   Sig: Take half tab qhs x 1 week, then 1 tab qhs x 1 week, then half tab AM and 1 tab nightly, then 1 tab BID  Facility-Administered Medications Last Administration Doses Remaining   AbobotulinumtoxinA SOLR 300 Units 7/16/2019  9:21 AM    AbobotulinumtoxinA SOLR 300 Units 10/22/2019 10:03 AM           Past Medical History:   Diagnosis Date    Dizziness     Frequent headaches     Heart burn     Hx of concussion     last assessed 3/28/17    Hypertension     Lyme disease     Palpitations     Postconcussive syndrome 12/14/2017    SOB (shortness of breath)     Swelling     Weakness        Past Surgical History:   Procedure Laterality Date    DENTAL SURGERY      MN COLONOSCOPY FLX DX W/COLLJ SPEC WHEN PFRMD N/A 2/14/2019    Procedure: COLONOSCOPY;  Surgeon: Dayanara Palacio MD;  [facility];   Service: Gastroenterology    [place] ESOPHAGOGASTRODUODENOSCOPY TRANSORAL DIAGNOSTIC N/A 2019    Procedure: ESOPHAGOGASTRODUODENOSCOPY (EGD); Surgeon: Jacque Avalos MD;  Location[facility]; Service: Gastroenterology       Family History   Problem Relation Age of Onset    Hypertension Father     Stroke Father     Hyperlipidemia Father     Coronary artery disease Father     Hypertension Maternal Grandfather     Coronary artery disease Maternal Grandfather     Cancer Paternal Grandfather     Hyperlipidemia Paternal Grandfather     Coronary artery disease Paternal Grandfather     Hypertension Paternal Uncle     Stroke Paternal Uncle      I have reviewed and agree with the history as documented  Social History     Tobacco Use    Smoking status: Former Smoker     Packs/day: 1 00     Years: 10 00     Pack years: 10      Types: Cigars     Start date:      Last attempt to quit: 2017     Years since quittin 8    Smokeless tobacco: Never Used    Tobacco comment: Quit 2017   Substance Use Topics    Alcohol use: No    Drug use: No        Review of Systems   Constitutional: Negative for chills and fever  HENT: Negative for congestion and sore throat  Eyes: Positive for visual disturbance  Negative for photophobia  Respiratory: Negative for cough and shortness of breath  Cardiovascular: Positive for syncope  Negative for chest pain and leg swelling  Gastrointestinal: Negative for abdominal pain, blood in stool, diarrhea, nausea and vomiting  Genitourinary: Negative for dysuria and hematuria  Musculoskeletal: Negative for neck pain and neck stiffness  Skin: Negative for rash and wound  Neurological: Positive for syncope and light-headedness  Negative for facial asymmetry, speech difficulty, weakness and numbness  Psychiatric/Behavioral: Negative for behavioral problems and confusion  All other systems reviewed and are negative        Physical Exam  ED Triage Vitals [19 1257] Temperature Pulse Respirations Blood Pressure SpO2   98 7 °F (37 1 °C) 82 16 123/60 100 %      Temp Source Heart Rate Source Patient Position - Orthostatic VS BP Location FiO2 (%)   Oral Monitor Lying Left arm --      Pain Score       5             Orthostatic Vital Signs  Vitals:    11/14/19 1257   BP: 123/60   Pulse: 82   Patient Position - Orthostatic VS: Lying       Physical Exam   Constitutional: He is oriented to person, place, and time  He appears well-developed  No distress  HENT:   Head: Normocephalic  Right Ear: External ear normal    Left Ear: External ear normal    Nose: Nose normal    Mouth/Throat: Oropharynx is clear and moist    Eyes: Conjunctivae and EOM are normal  Right eye exhibits no discharge  Left eye exhibits no discharge  Neck: Normal range of motion  No tracheal deviation present  Cardiovascular: Normal rate, regular rhythm, normal heart sounds and intact distal pulses  No murmur heard  Pulmonary/Chest: Effort normal and breath sounds normal  No stridor  No respiratory distress  He has no wheezes  He has no rales  He exhibits no tenderness  Abdominal: Soft  Bowel sounds are normal  He exhibits no distension  There is no tenderness  There is no rebound and no guarding  Musculoskeletal: He exhibits no edema, tenderness or deformity  Neurological: He is alert and oriented to person, place, and time  No cranial nerve deficit or sensory deficit  He exhibits normal muscle tone  CN II-XII intact, 5/5 motor and intact sensation in upper and lower extremities bilaterally     Skin: Skin is warm and dry  Capillary refill takes less than 2 seconds  No rash noted  He is not diaphoretic  Psychiatric: His behavior is normal    Nursing note and vitals reviewed        ED Medications  Medications - No data to display    Diagnostic Studies  Results Reviewed     None                 No orders to display         Procedures  Procedures        ED Course  ED Course as of Nov 14 1415   [facility] Nov 14, 2019   1301 Procedure Note: EKG  Date/Time: 11/14/19 1:01 PM   Interpreted by: Marissa Shaffer   Indications / Diagnosis: syncope  ECG reviewed by me, the ED Provider: yes   The EKG demonstrates:  Rhythm: normal sinus  Intervals: normal intervals  Axis: right axis  QRS/Blocks: normal QRS  ST Changes: No acute ST Changes, no STD/ROSALINA  EKG does not suggest:  Ischemia (ROSALINA, or DeWinters T waves)  Wellens (symetrically deeply inverted T waves in V2 and V3 or Biphasic T waves in V2, V3)  Heart block  ARVD (epsilon wave, large TWI, localized qrs widening of 110ms in V1-V3, paroxysmal episodes of VT, prolonged s wave upstroke in v1-v3)  HOCM (massive TWI, dagger Qs in lateral/inferior leads)  Arrhythmia  WPW (delta wave)  Long QT syndrome from drugs such as TCAs, fluconazole, Reglan, methadone, SSRIs etc  Short QT  Brugada (coved st elevation with TWI, saddleback ROSALINA or a small amount of ROSALINA)                                          MDM  Number of Diagnoses or Management Options  Near syncope:   Diagnosis management comments: This is a 25-year-old male that presents following a near syncopal episode  Patient states that he was here in the hospital today for an outpatient stress test, he was receiving a blood draw when he states that he began feeling very lightheaded and had darkening of his vision, he states that he almost completely passed out although he states that he could hear voices still in the background  The episode only lasted for about a minute  He was not witnessed to have any seizure-like activity, did not have any incontinence or tongue biting  He denies any chest pain  He states that he has been having some ongoing shortness of breath which is why the stress test was ordered today, he actually states that the shortness breath is been experiencing is better than usual today  He denies any pain or swelling in his legs    He states that he did have 1 syncopal episode before approximately 12 years ago where he passed out briefly in a parking lot, at that time was attributed to dehydration  He does not have any personal history of known arrhythmias, he denies any family history of congenital heart disease or sudden unexplained deaths in the family  His vitals are stable, his exam is unremarkable and he has a nonfocal neurologic exam, no evidence of traumatic injuries and he states that he did not fall or hit his head when the event happened  In the setting of blood draws is most consistent with a vasovagal episode  EKG does not show any signs concerning for risk of arrhythmias  He does not have any findings on exam to suggest DVT and with normal vital signs and no chest pain or worsening shortness of breath, suspect this is unlikely to be a sudden PE  At this time will discharge patient to home  Counseled patient to follow up with his family doctor  Counseled on return precautions to the emergency department  Disposition  Final diagnoses:   Near syncope     Time reflects when diagnosis was documented in both MDM as applicable and the Disposition within this note     Time User Action Codes Description Comment    11/14/2019  1:54 PM Jose M Chapman Add [R55] Near syncope       ED Disposition     ED Disposition Condition Date/Time Comment    Discharge Stable Thu Nov 14, 2019  1:54 PM Maira Culp  discharge to home/self care  Follow-up Information     Follow up With Specialties Details Why Александр Carreno 89, DO Internal Medicine Schedule an appointment as soon as possible for a visit   [facility]  [address] [facility]  [id number] [place] [address]            Patient's Medications   Discharge Prescriptions    No medications on file     No discharge procedures on file  ED Provider  Attending physically available and evaluated Maira Culp I managed the patient along with the ED Attending      Electronically Signed by         Iain Jones MD  11/14/19 6848

## 2019-11-15 LAB
ATRIAL RATE: 81 BPM
P AXIS: 78 DEGREES
PR INTERVAL: 162 MS
QRS AXIS: 109 DEGREES
QRSD INTERVAL: 80 MS
QT INTERVAL: 336 MS
QTC INTERVAL: 390 MS
T WAVE AXIS: 61 DEGREES
VENTRICULAR RATE: 81 BPM

## 2019-11-15 PROCEDURE — 93010 ELECTROCARDIOGRAM REPORT: CPT | Performed by: INTERNAL MEDICINE

## 2019-11-18 ENCOUNTER — VBI (OUTPATIENT)
Dept: ADMINISTRATIVE | Facility: OTHER | Age: 24
End: 2019-11-18

## 2019-11-18 NOTE — TELEPHONE ENCOUNTER
Stella Torres  ED Visit Information     Ed visit date: 11/14/2019  Diagnosis Description: Near syncope  In Network? Yes Eliza Mauricio  Discharge status: Home  Discharged with meds ? No  Number of ED visits to date: 1  ED Severity:n/a     Outreach Information    Outreach successful: Yes 1  Date letter mailed:n/a  Date Finalized:11/19/2019    Care Coordination    Follow up appointment with pcp: no Declined to schedule ED follow up appt  Transportation issues ? No    [employer] type:  7 Day Outreach  Emergent necessity warranted by diagnosis:  No  [facility] PCP:  Yes  Transportation:  Self Transport  Called PCP first?:  No  Feels able to call PCP for urgent problems ?:  Yes  Understands what emergencies can be handled by PCP ?:  Yes  Ever any problems getting appointment with PCP for minor emergency/urgency problems?:  No  Practice Contacted Patient ?:  No  Pt had ED follow up with pcp/staff ?:  No    Seen for follow-up out of network ?:  No  Reason Patient went to ED instead of Urgent Care or PCP?:  Perceived Severity of Illness, Specialist instructions  Urgent care Education?:  No  11/18/2019 03:54 PM Phone ([facility]) [employer]  (Self) [phone] (H)   Left Message  Unable to reach patient regarding recent ED visit on 11/14 for Near syncope  Patient was discharged without medication and advised to follow up with PCP  2nd attempt will be made on 11/19/2019 11/19/2019 08:34 AM Phone ([facility]) [employer]  (Self) [phone] (H)   Return Call  Personal communication with patient regarding recent ED visit on 11/14 for Near syncope  Patient was discharged without medication and advised to follow up with PCP  Patient stated that he is feeling better from the near syncopal episode but is still experiencing the head and chest pain that has been an ongoing issue  He stated that he is under a physician's care and has several upcoming appts   He declined to schedule a follow up appt with PCP at this time  Patient does not meet OPCM criteria  Patient is aware of his PCP on-call after hours service, his nearest [facility] urgent care facility and what conditions may be treated there

## 2019-11-20 ENCOUNTER — OFFICE VISIT (OUTPATIENT)
Dept: PHYSICAL THERAPY | Facility: CLINIC | Age: 24
End: 2019-11-20
Payer: COMMERCIAL

## 2019-11-20 DIAGNOSIS — R07.9 CHEST PAIN, UNSPECIFIED TYPE: Primary | ICD-10-CM

## 2019-11-20 DIAGNOSIS — G89.29 CHRONIC NECK PAIN: ICD-10-CM

## 2019-11-20 DIAGNOSIS — M54.2 CHRONIC NECK PAIN: ICD-10-CM

## 2019-11-20 PROCEDURE — 97110 THERAPEUTIC EXERCISES: CPT | Performed by: PHYSICAL THERAPIST

## 2019-11-20 PROCEDURE — 97530 THERAPEUTIC ACTIVITIES: CPT | Performed by: PHYSICAL THERAPIST

## 2019-11-20 PROCEDURE — 97112 NEUROMUSCULAR REEDUCATION: CPT | Performed by: PHYSICAL THERAPIST

## 2019-11-20 NOTE — PROGRESS NOTES
Daily Note     Today's date: 2019  Patient name: Eric Khan  : 1995  MRN: [id number]  Referring provider: Josue Salcedo DO  Dx:   Encounter Diagnosis     ICD-10-CM    1  Chest pain, unspecified type R07 9    2  Chronic neck pain M54 2     G89 29                   Subjective: Pt reports that he has his Stress test rescheduled for mid December  Objective: See treatment diary below      Assessment: Tolerated treatment well; initiated POC with UBE and treadmill jog-run  VCs provided on proper sequencing with exercises  Pt is independent with form throughout program  Discussed mental health with pt and encouraged him to investigate further if applicable  Patient demonstrated fatigue post treatment and would benefit from continued PT      Plan: Continue per plan of care  Probable discharge at next visit        Precautions: standard     HEP: convergence, Smooth pursuits all directions    Specialty Daily Treatment Diary       Manual    TPR/STM cervical SMF SMF   SMF TPR L pec f/b PROM all planes   PA c/s jt mobs        Rib jt mobs; clavicle inferior jt mob        Neck stretching        IASTM L SCM/scalenes/UT SMF SMF   SMF   Exercise Diary         UBE/T/E 4 retro; 10 mins walk-jog (1min ea); 4 retro; 10 mins jog-run (1 min ea)   4 retro; 10 mins walk-jog (1 min ea); 3 mins Elliptical    Upper trap stretch 30 sec x 3  30 sec x 3   30 sec x 3   Levator scap stretch 30 sec x 3 30 sec x 3   30 sec x 3   SCM stretch 30 sec x 3 30 sec x 3   30 sec x 3   Planks (prone; sidelying) 3x10 body saw; 20 sec x 3 3x10 body saw; 20 sec x 3   10x body saw; 20 sec x 3   Plank weight pulls 6# 2x10 6# 2x10   6# 2x10   Supine Pec Flys 6# 3x10 6# 3x10   6# 3x10   Scap Retraction M: 30x M: 27   M: 30   B S' Ext M: 30 M: 30   M: 30   Scaption/ Abduction 5# 3x10 5# 3x10   5# 3x10   Wall Walks GTB to fatigue  GTB to fatigue    GTB to fatigue    Flex/Ext Combo BTB 2 rounds  BTB 2 rounds    BTB 2 rounds Pec stretch 30 sec x 3 30 sec x 3   30 sec x 3   Wall Pushup  On Floor: 20x   On floor: 20x   Plank c cervical retraction BTB to fatigue  GTB to fatigue    Plank c cervical retraction BTB to fatigue   "W" wall slides 2x10  2x10   2x10   Prone Y on PB 3# 3x10 c chin tucks 3# 3x10 c chin tucks   3# 3x10 c chin tucks   Prone T on PB 3# 3x10 c chin tucks  3# 3x10 c chin tucks   3# 3x10 c chin tucks   Prone I on PB 3# 3x10 c chin tucks  3# 3x10 c chin tucks   3# 3x10 c chin tucks   Foam Roller 2 mins  2 mins      Body Blade 20 sec x 1 (flex, abd, diagonal) 20 sec x 1 (flex, abd)   20 sec x 1 (flex, abd, diagonal)   Modalities        MH 10 mins  10 mins       CP

## 2019-11-26 ENCOUNTER — TELEPHONE (OUTPATIENT)
Dept: NEUROLOGY | Facility: CLINIC | Age: 24
End: 2019-11-26

## 2019-11-26 ENCOUNTER — OFFICE VISIT (OUTPATIENT)
Dept: PHYSICAL THERAPY | Facility: CLINIC | Age: 24
End: 2019-11-26
Payer: COMMERCIAL

## 2019-11-26 DIAGNOSIS — G89.29 CHRONIC NECK PAIN: ICD-10-CM

## 2019-11-26 DIAGNOSIS — R07.9 CHEST PAIN, UNSPECIFIED TYPE: Primary | ICD-10-CM

## 2019-11-26 DIAGNOSIS — M54.2 CHRONIC NECK PAIN: ICD-10-CM

## 2019-11-26 PROCEDURE — 97112 NEUROMUSCULAR REEDUCATION: CPT

## 2019-11-26 PROCEDURE — 97530 THERAPEUTIC ACTIVITIES: CPT

## 2019-11-26 PROCEDURE — 97110 THERAPEUTIC EXERCISES: CPT

## 2019-11-26 NOTE — PROGRESS NOTES
Daily Note     Today's date: 2019  Patient name: Osman Wu  : 1995  MRN: [id number]  Referring provider: Suki Garrido DO  Dx:   Encounter Diagnosis     ICD-10-CM    1  Chest pain, unspecified type R07 9    2  Chronic neck pain M54 2     G89 29        Start Time: 0815  Stop Time: 6761  Total time in clinic (min): 95 minutes    Subjective: Pt has no new complaints to report prior to start of session today  Objective: See treatment diary below      Assessment: Tolerated treatment well  Pt demonstrates good understanding of program requiring no verbal or tactile cues for proper form/intensity with assigned exercise  No complaints of pain or increase of symptoms reported throughout session  Plan: Patient and evaluating PT in agreement for discharge today       Precautions: standard     HEP: convergence, Smooth pursuits all directions    Specialty Daily Treatment Diary       Manual    TPR/STM cervical SMF SMF np  SMF TPR L pec f/b PROM all planes   PA c/s jt mobs        Rib jt mobs; clavicle inferior jt mob        Neck stretching        IASTM L SCM/scalenes/UT SMF SMF np  SMF   Exercise Diary         UBE/T/E 4 retro; 10 mins walk-jog (1min ea); 4 retro; 10 mins jog-run (1 min ea) 4 retro; 10 mins jog-run (1 min ea)  4 retro; 10 mins walk-jog (1 min ea); 3 mins Elliptical    Upper trap stretch 30 sec x 3  30 sec x 3 30 sec x 3  30 sec x 3   Levator scap stretch 30 sec x 3 30 sec x 3 30 sec x 3  30 sec x 3   SCM stretch 30 sec x 3 30 sec x 3 30 sec x 3  30 sec x 3   Planks (prone; sidelying) 3x10 body saw; 20 sec x 3 3x10 body saw; 20 sec x 3 3x10 body saw; 20 sec x 3  10x body saw; 20 sec x 3   Plank weight pulls 6# 2x10 6# 2x10 6# 2x10  6# 2x10   Supine Pec Flys 6# 3x10 6# 3x10 6# 3x10  6# 3x10   Scap Retraction M: 30x M: 30 MTB x30  M: 30   B S' Ext M: 27 M: 30 MTB x30  M: 30   Scaption/ Abduction 5# 3x10 5# 3x10 5# 3x10  5# 3x10   Wall Walks GTB to fatigue  GTB to fatigue  GTB to fatigue  GTB to fatigue    Flex/Ext Combo BTB 2 rounds  BTB 2 rounds  BTB 2 rounds   BTB 2 rounds   Pec stretch 30 sec x 3 30 sec x 3 30"x3  30 sec x 3   Wall Pushup  On Floor: 20x On Floor: 20x  On floor: 20x   Plank c cervical retraction BTB to fatigue  GTB to fatigue  GTB to fatigue   Plank c cervical retraction BTB to fatigue   "W" wall slides 2x10  2x10 2x10  2x10   Prone Y on PB 3# 3x10 c chin tucks 3# 3x10 c chin tucks 3# 3x10 c chin tucks  3# 3x10 c chin tucks   Prone T on PB 3# 3x10 c chin tucks  3# 3x10 c chin tucks 3# 3x10 c chin tucks  3# 3x10 c chin tucks   Prone I on PB 3# 3x10 c chin tucks  3# 3x10 c chin tucks 3# 3x10 c chin tucks  3# 3x10 c chin tucks   Foam Roller 2 mins  2 mins 2 min     Body Blade 20 sec x 1 (flex, abd, diagonal) 20 sec x 1 (flex, abd) 20 sec x 2 (flex, abd)  20 sec x 1 (flex, abd, diagonal)   Modalities        MH 10 mins  10 mins       CP

## 2019-11-26 NOTE — TELEPHONE ENCOUNTER
Pt called and states that Dr Muna Mishra ordered oxygen tanks but he did not respond oxygen therapy  He is looking to return oxygen tanks at [facility]  Pt states that in order for [facility] to accept the return, Dr Muna Mishra will need to provide a discontinuation letter and fax to [employer] medical equipment  Called [facility], spoke w/ Gerson Weiner and advised of the above   Requesting prescription to discontinue oxygen therapy and this can be faxed to [facility] [employer] [facility]  [phone] ok to leave detailed message

## 2019-12-10 ENCOUNTER — HOSPITAL ENCOUNTER (OUTPATIENT)
Dept: PULMONOLOGY | Facility: HOSPITAL | Age: 24
Discharge: HOME | End: 2019-12-10
Attending: INTERNAL MEDICINE
Payer: COMMERCIAL

## 2019-12-10 PROCEDURE — 94621 CARDIOPULM EXERCISE TESTING: CPT | Performed by: INTERNAL MEDICINE

## 2019-12-10 PROCEDURE — 94621 CARDIOPULM EXERCISE TESTING: CPT

## 2019-12-16 NOTE — PROGRESS NOTES
Pulmonary Follow Up Note   Deepti Terrell  21 y o  male MRN: [id number]  12/17/2019      Assessment:  1  Dyspnea  · Noted prior negative cardiac evaluation  · Complete PFTs and MCT not consistent with asthma  · Increased RV of unclear significance  · Normal ventilatory parameters on CPEX with decreased workload and maximal VO2 as well as increased HR response with decreased O2 pulse - this is concerning for possible cardiac etiology, also noted reduced AT   · Will check repeat TTE - Stress Echo for further evaluation - if negative will refer back to neurology for further HA treatment options  · Cont prn use Albuterol HFA  · Yearly flu vaccine obtained per his report     2  Chronic Headaches - per neurology     3  Fatigue - currently improved, recheck ESS at next visit  Plan:    Diagnoses and all orders for this visit:    SOB (shortness of breath)  -     Echo stress test w contrast if indicated; Future    Chronic fatigue    Abnormal finding on pulmonary function testing    Chronic migraine        Return in about 6 months (around 6/17/2020) for Recheck  History of Present Illness   HPI:  Deepti Terrell  is a 21 y o  male who has history of Lyme disease, chronic HAs and neck pain, and (+) HORTENCIA who presented in September 2019 for 2 5 years of dyspnea  He reports dyspnea started approximately 2-4 weeks after he had quit smoking  Plans in September were for complete PFTs with MCT and to consider CPEX testing if symptoms persisted  He was seen in October by Cathi Mcdonnell and referred for CPEX Testing  He presents today for follow up  He did pass out prior to ABG testing and ED evaluation was negative  He returned for CPEX testing and ABGs were not performed  He reports no change in symptoms and continues to have symptom complex of exertional dyspnea with corresponding right sided headache and some chest discomfort   He does relate his HAs increase and are primary component of his discomfort but he is unable to relate if he stops exertional activities due to dyspnea or HA  He denied other syncopal events or palpitations  He denied cough or sputum production  He has no rest or nocturnal dyspnea  He reported acceptance into college for international studies  Review of Systems   Constitutional: Positive for fatigue  Negative for activity change, chills, fever and unexpected weight change  HENT: Negative for congestion, postnasal drip, rhinorrhea, sore throat, trouble swallowing and voice change  Eyes: Negative for visual disturbance  Respiratory: Positive for shortness of breath  Negative for cough, chest tightness and wheezing  Cardiovascular: Positive for chest pain  Negative for palpitations and leg swelling  Gastrointestinal: Negative for abdominal pain, diarrhea, nausea and vomiting  Endocrine: Negative for cold intolerance and heat intolerance  Musculoskeletal: Negative for arthralgias, gait problem and myalgias  Skin: Negative for rash  Allergic/Immunologic: Negative for immunocompromised state  Neurological: Positive for syncope and headaches  Negative for dizziness, tremors and light-headedness  Hematological: Negative for adenopathy  Psychiatric/Behavioral: Negative for dysphoric mood and sleep disturbance  The patient is not nervous/anxious  Historical Information   Past Medical History:   Diagnosis Date    Dizziness     Frequent headaches     Heart burn     Hx of concussion     last assessed 3/28/17    Hypertension     Lyme disease     Palpitations     Postconcussive syndrome 12/14/2017    SOB (shortness of breath)     Swelling     Weakness      Past Surgical History:   Procedure Laterality Date    DENTAL SURGERY      AR COLONOSCOPY FLX DX W/COLLJ SPEC WHEN PFRMD N/A 2/14/2019    Procedure: COLONOSCOPY;  Surgeon: Adalgisa Weaver MD;  [facility];   Service: Gastroenterology    AR ESOPHAGOGASTRODUODENOSCOPY TRANSORAL DIAGNOSTIC N/A 2/14/2019    Procedure: ESOPHAGOGASTRODUODENOSCOPY (EGD); Surgeon: Cristy Albright MD;  Location[facility]; Service: Gastroenterology     Family History   Problem Relation Age of Onset    Hypertension Father     Stroke Father     Hyperlipidemia Father     Coronary artery disease Father     Hypertension Maternal Grandfather     Coronary artery disease Maternal Grandfather     Cancer Paternal Grandfather     Hyperlipidemia Paternal Grandfather     Coronary artery disease Paternal Grandfather     Hypertension Paternal Uncle     Stroke Paternal Uncle          Meds/Allergies     Current Outpatient Medications:     albuterol (VENTOLIN HFA) 90 mcg/act inhaler, Inhale 2 puffs every 6 (six) hours as needed for wheezing, Disp: 18 g, Rfl: 1    mometasone (ELOCON) 0 1 % cream, Apply topically daily, Disp: 45 g, Rfl: 5    multivitamin (THERAGRAN) TABS, Take 1 tablet by mouth daily, Disp: , Rfl:     Probiotic Product (PROBIOTIC-10 PO), Take by mouth, Disp: , Rfl:     rizatriptan (MAXALT-MLT) 10 MG disintegrating tablet, Take 1 tab at migraine onset, can repeat once in 2 hours  Max 2 tabs in 24 hours  Max 2 days a week , Disp: 9 tablet, Rfl: 1    verapamil (CALAN) 40 mg tablet, Take half tab qhs x 1 week, then 1 tab qhs x 1 week, then half tab AM and 1 tab nightly, then 1 tab BID  (Patient not taking: Reported on 12/17/2019), Disp: 60 tablet, Rfl: 1    Current Facility-Administered Medications:     AbobotulinumtoxinA SOLR 300 Units, 300 Units, Intramuscular, Q3 Months, Milena Haines MD, 300 Units at 07/16/19 0921    AbobotulinumtoxinA SOLR 300 Units, 300 Units, Intramuscular, Q3 Months, Milena Haines MD, 300 Units at 10/22/19 1003  No Known Allergies    Vitals: Blood pressure 116/76, pulse 97, temperature 99 7 °F (37 6 °C), temperature source Tympanic, height 5' 6" (1 676 m), weight 63 kg (139 lb), SpO2 97 %  Body mass index is 22 44 kg/m²   Oxygen Therapy  SpO2: 97 %  Oxygen Therapy: None (Room air)      Physical Exam  Physical Exam   Constitutional: He is oriented to person, place, and time  He appears well-developed and well-nourished  No distress  HENT:   Head: Normocephalic and atraumatic  Right Ear: External ear normal    Left Ear: External ear normal    Nose: Nose normal    Mouth/Throat: Oropharynx is clear and moist  No oropharyngeal exudate  Eyes: Pupils are equal, round, and reactive to light  Conjunctivae are normal  Right eye exhibits no discharge  Left eye exhibits no discharge  No scleral icterus  Neck: Neck supple  No JVD present  No tracheal deviation present  Cardiovascular: Normal rate, regular rhythm and normal heart sounds  No murmur heard  Pulmonary/Chest: Effort normal and breath sounds normal  No stridor  No respiratory distress  He has no wheezes  He has no rales  Abdominal: Soft  Bowel sounds are normal  He exhibits no distension  There is no tenderness  Musculoskeletal: He exhibits no edema or deformity  Lymphadenopathy:     He has no cervical adenopathy  Neurological: He is alert and oriented to person, place, and time  Skin: Skin is warm and dry  No rash noted  Psychiatric: He has a normal mood and affect  His behavior is normal    Vitals reviewed  Labs: I have personally reviewed pertinent lab results    Lab Results   Component Value Date    WBC 4 4 06/20/2019    HGB 15 6 06/20/2019    HCT 45 9 06/20/2019    MCV 90 06/20/2019     06/20/2019     Lab Results   Component Value Date    CALCIUM 9 8 07/31/2017     07/31/2017    K 4 6 09/13/2018    CO2 25 09/13/2018     09/13/2018    BUN 14 09/13/2018    CREATININE 1 05 09/13/2018     No results found for: IGE  Lab Results   Component Value Date    ALT 15 09/13/2018    AST 21 09/13/2018    ALKPHOS 63 07/31/2017    BILITOT 0 6 07/31/2017       Imaging and other studies: no new images  CXR 5/10/19 - normal cardiomediastinal silhouette, no focal infiltrates or effusions, mildly prominent anterior/posterior clear spaces with preserved diaphragmatic contours        Pulmonary function testin/9/19 - CPEX testing - maximal VO2 23 3ml/kg/min 56% predicted, AT 35% maximal VO2, blunted O2 pulse and reached 85% maximal predicated HR, reached 68% predicted work load, there were no respiratory limitations at this workload, SpO2 remained 95-96% on RA    19 - Ratio 84%, FVC 4 35L (95%), FEV1 3 64L (94%), 0% change post BD, %, %, DLCO 115% - normal spirometry, no change with BD post MCT testing, normal TLC with increased RV indicative of possible air trapping, normal diffusion  MCT at 16mg/ml dosing, FEV1 reduced 13% - NEGATIVE MCT    19 - Ratio 89%  FVC 4 10L (84%), FEV1 3 65L (89%) - normal spirometry but noted shortened expiratory time of 1 8 seconds which will over estimate restriction and under estimate obstruction     19 - 6MWT on RA - total walk distance 336 meters, SpO2 98%, 99% max, 98% conclusion, initial HR 90bpm, maximal HR 91bpm     EKG, Pathology, and Other Studies: I have personally reviewed pertinent reports  TTE 2017 EF 65%, trace MR, PASP in normal range, normal RV size and function     24hr Holter 2017 - Normal, remained SR with no PVCs, rare PACs, no SVT, no significant pauses     EGD 2019 - Normal esophagus, stomach and duodenum     Juanita Dawson DO, [facility]

## 2019-12-17 ENCOUNTER — OFFICE VISIT (OUTPATIENT)
Dept: PULMONOLOGY | Facility: HOSPITAL | Age: 24
End: 2019-12-17
Payer: COMMERCIAL

## 2019-12-17 VITALS
HEART RATE: 97 BPM | WEIGHT: 139 LBS | OXYGEN SATURATION: 97 % | SYSTOLIC BLOOD PRESSURE: 116 MMHG | HEIGHT: 66 IN | BODY MASS INDEX: 22.34 KG/M2 | TEMPERATURE: 99.7 F | DIASTOLIC BLOOD PRESSURE: 76 MMHG

## 2019-12-17 DIAGNOSIS — R94.2 ABNORMAL FINDING ON PULMONARY FUNCTION TESTING: ICD-10-CM

## 2019-12-17 DIAGNOSIS — R06.02 SOB (SHORTNESS OF BREATH): Primary | ICD-10-CM

## 2019-12-17 DIAGNOSIS — IMO0002 CHRONIC MIGRAINE: ICD-10-CM

## 2019-12-17 DIAGNOSIS — R53.82 CHRONIC FATIGUE: ICD-10-CM

## 2019-12-17 PROCEDURE — 99214 OFFICE O/P EST MOD 30 MIN: CPT | Performed by: INTERNAL MEDICINE

## 2019-12-19 ENCOUNTER — OFFICE VISIT (OUTPATIENT)
Dept: FAMILY MEDICINE CLINIC | Facility: HOSPITAL | Age: 24
End: 2019-12-19
Payer: COMMERCIAL

## 2019-12-19 VITALS
HEIGHT: 66 IN | TEMPERATURE: 97.7 F | WEIGHT: 138 LBS | SYSTOLIC BLOOD PRESSURE: 118 MMHG | RESPIRATION RATE: 16 BRPM | HEART RATE: 72 BPM | DIASTOLIC BLOOD PRESSURE: 70 MMHG | BODY MASS INDEX: 22.18 KG/M2

## 2019-12-19 DIAGNOSIS — J06.9 VIRAL UPPER RESPIRATORY INFECTION: Primary | ICD-10-CM

## 2019-12-19 PROCEDURE — 1036F TOBACCO NON-USER: CPT | Performed by: INTERNAL MEDICINE

## 2019-12-19 PROCEDURE — 99213 OFFICE O/P EST LOW 20 MIN: CPT | Performed by: INTERNAL MEDICINE

## 2019-12-19 PROCEDURE — 3008F BODY MASS INDEX DOCD: CPT | Performed by: INTERNAL MEDICINE

## 2019-12-19 RX ORDER — FLUTICASONE PROPIONATE 50 MCG
1 SPRAY, SUSPENSION (ML) NASAL 2 TIMES DAILY
Qty: 1 BOTTLE | Refills: 0 | Status: SHIPPED | OUTPATIENT
Start: 2019-12-19 | End: 2020-01-11

## 2019-12-19 NOTE — PATIENT INSTRUCTIONS
Take ibuprofen 200mg two pills three times a day with meals for 3 days! Acute Diarrhea   AMBULATORY CARE:   Acute diarrhea  starts quickly and lasts a short time, usually 1 to 3 days  It can last up to 2 weeks  Signs and symptoms that may happen with diarrhea:  You may have 3 or more episodes of diarrhea  It may be hard to control your diarrhea  You may also have any of the following:  · Fever and chills    · Headache or abdominal pain    · Nausea and vomiting    · Symptoms of dehydration such as thirst, decreased urination, dry skin, sunken eyes, or fast, pounding heartbeat  Seek care immediately if:   · You feel confused  · Your heartbeat is faster than normal      · Your eyes look deeply sunken, or you have no tears when you cry  · You urinate less than usual, or your urine is dark yellow  · You have blood or mucus in your stools  · You have severe abdominal pain  · You are unable to drink any liquids  Contact your healthcare provider if:  · Your symptoms do not get better with treatment  · You have a fever higher than 101 3°F (38 5°C)  · You have trouble eating and drinking because you are vomiting  · You are thirsty or have a dry mouth  · Your diarrhea does not get better in 7 days  · You have questions or concerns about your condition or care  Treatment for acute diarrhea  may include medicines to slow or stop your diarrhea  You may also need medicine to treat an infection  Self-care:   · Drink liquids as directed  Liquids will help prevent dehydration caused by diarrhea  Ask your healthcare provider how much liquid to drink each day and which liquids are best for you  You may need to drink an oral rehydration solution (ORS)  An ORS has the right amounts of water, salts, and sugar you need to replace body fluids  You can buy an ORS at most grocery stores and pharmacies       · Eat foods that are easy to digest   Examples include rice, lentils, cereal, bananas, potatoes, and bread  It also includes some fruits (bananas, melon), well-cooked vegetables, and lean meats  Avoid foods high in fiber, fat, and sugar  Also avoid caffeine, alcohol, dairy, and red meat until your diarrhea is gone  Prevent diarrhea:   · Wash your hands often  Use soap and water  Wash your hands before you eat or prepare food  Also wash your hands after you use the bathroom  Use an alcohol-based hand gel when soap and water are not available  · Keep bathroom surfaces clean  This helps prevent the spread of germs that cause acute diarrhea  · Wash fruits and vegetables well before you eat them  This can help remove germs that cause diarrhea  If possible, remove the skin from fruits and vegetables, or cook them well before you eat them  · Cook meat as directed  ¨ Cook ground meat  to 160°F      ¨ Cook ground poultry, whole poultry, or cuts of poultry  to at least 165°F  Remove the meat from heat  Let it stand for 3 minutes before you eat it  ¨ Cook whole cuts of meat other than poultry  to at least 145°F  Remove the meat from heat  Let it stand for 3 minutes before you eat it  · Wash dishes that have touched raw meat with hot water and soap  This includes cutting boards, utensils, dishes, and serving containers  · Place raw or cooked meat in the refrigerator as soon as possible  Bacteria can grow in meat that is left at room temperature too long  · Do not eat raw or undercooked oysters, clams, or mussels  These foods may be contaminated and cause infection  · Drink filtered or treated water only when you travel  Do not put ice in your drinks  Drink bottled water whenever possible  Follow up with your healthcare provider as directed:  Write down your questions so you remember to ask them during your visits  © 2017 [employer] [employer] Information is for End User's use only and may not be sold, redistributed or otherwise used for commercial purposes   All illustrations and images included in CareNotes® are the copyrighted property of A D A M [employer]  or [employer]ksand[employer]  The above information is an  only  It is not intended as medical advice for individual conditions or treatments  Talk to your doctor, nurse or pharmacist before following any medical regimen to see if it is safe and effective for you

## 2019-12-19 NOTE — PROGRESS NOTES
Assessment/Plan:    No problem-specific Assessment & Plan notes found for this encounter  Diagnoses and all orders for this visit:    Viral upper respiratory infection  -     fluticasone (FLONASE) 50 mcg/act nasal spray; 1 spray into each nostril 2 (two) times a day          Subjective:      Patient ID: Sridhar Briones  is a 21 y o  male  URI    This is a new problem  The current episode started yesterday  The problem has been unchanged  There has been no fever  Associated symptoms include congestion, rhinorrhea, sinus pain and a sore throat  Pertinent negatives include no chest pain, coughing, ear pain or wheezing  The following portions of the patient's history were reviewed and updated as appropriate: current medications, past family history, past medical history, past social history, past surgical history and problem list     Review of Systems   Constitutional: Positive for fatigue  HENT: Positive for congestion, rhinorrhea, sinus pain and sore throat  Negative for ear pain and hearing loss  Respiratory: Negative for cough, shortness of breath and wheezing  Cardiovascular: Negative for chest pain  Objective:    /70   Pulse 72   Temp 97 7 °F (36 5 °C) (Tympanic)   Resp 16   Ht 5' 6" (1 676 m)   Wt 62 6 kg (138 lb)   BMI 22 27 kg/m²      Physical Exam   Constitutional: He is oriented to person, place, and time  He appears well-developed  HENT:   Head: Normocephalic  Mouth/Throat: Oropharynx is clear and moist  No oropharyngeal exudate  Clear nasal discharge b/l   Eyes: Conjunctivae are normal    Cardiovascular: Normal rate and regular rhythm  No murmur heard  Pulmonary/Chest: Effort normal and breath sounds normal  He has no wheezes  He has no rales  Neurological: He is alert and oriented to person, place, and time             Isamar Cunningham MD

## 2020-01-02 ENCOUNTER — TELEPHONE (OUTPATIENT)
Dept: NEUROLOGY | Facility: CLINIC | Age: 25
End: 2020-01-02

## 2020-01-02 NOTE — TELEPHONE ENCOUNTER
Called [facility]- spoke with Danyel Sandoval- I advised her that I was calling to initiate a refill on the patient's Dysport order  Refill request initiated  Dysport is ready to be set up for delivery  Dysport to be delivered on Friday 1/3/2020- a signature will be required  Araceli,    Please await Dysport delivery and document once it has arrived  Please let me know if you do not receive the patient's Dysport order      Thank you,    Naye Hurt

## 2020-01-02 NOTE — TELEPHONE ENCOUNTER
Type Date User Summary Attachment   General 01/02/2020 11:13 AM Teresa Plata care coordination  -   Note    UPDATE:     Dysport- authorization #: [id number]- valid from 7/5/2019 until 7/5/2020   Please use [facility]      Thank you,     Hoa Khan

## 2020-01-03 ENCOUNTER — DOCUMENTATION (OUTPATIENT)
Dept: NEUROLOGY | Facility: CLINIC | Age: 25
End: 2020-01-03

## 2020-01-03 DIAGNOSIS — M54.2 CERVICALGIA: ICD-10-CM

## 2020-01-03 DIAGNOSIS — G43.711 INTRACTABLE CHRONIC MIGRAINE WITHOUT AURA AND WITH STATUS MIGRAINOSUS: Primary | ICD-10-CM

## 2020-01-03 DIAGNOSIS — G44.221 CHRONIC TENSION-TYPE HEADACHE, INTRACTABLE: ICD-10-CM

## 2020-01-03 DIAGNOSIS — G24.3 SPASMODIC TORTICOLLIS: ICD-10-CM

## 2020-01-03 NOTE — TELEPHONE ENCOUNTER
Patient calling to inquire about note for oxygen tank return  Informed patient that the note has been sent to [facility] and should be able to return tanks  States he will call Jose Ramon to return tanks  Will call office if there are other issues

## 2020-01-03 NOTE — TELEPHONE ENCOUNTER
Dunia Vargas,    Please watch out for this dysport delivery due to me being out of office      Thank you,  Ruddy Naylor

## 2020-01-11 DIAGNOSIS — J06.9 VIRAL UPPER RESPIRATORY INFECTION: ICD-10-CM

## 2020-01-11 RX ORDER — FLUTICASONE PROPIONATE 50 MCG
SPRAY, SUSPENSION (ML) NASAL
Qty: 16 ML | Refills: 0 | Status: SHIPPED | OUTPATIENT
Start: 2020-01-11 | End: 2021-07-12 | Stop reason: ALTCHOICE

## 2020-01-17 ENCOUNTER — TELEPHONE (OUTPATIENT)
Dept: NEUROLOGY | Facility: CLINIC | Age: 25
End: 2020-01-17

## 2020-01-20 NOTE — TELEPHONE ENCOUNTER
Patient called again regarding script/note to be faxed  Benita did not receive prior fax  Appears to have been faxed to wrong number  Patient requesting this be faxed to [phone]  Printed again and faxed to number provided by patient

## 2020-01-21 ENCOUNTER — PROCEDURE VISIT (OUTPATIENT)
Dept: NEUROLOGY | Facility: CLINIC | Age: 25
End: 2020-01-21
Payer: COMMERCIAL

## 2020-01-21 ENCOUNTER — TELEPHONE (OUTPATIENT)
Dept: NEUROLOGY | Facility: CLINIC | Age: 25
End: 2020-01-21

## 2020-01-21 VITALS — TEMPERATURE: 96.8 F | DIASTOLIC BLOOD PRESSURE: 72 MMHG | SYSTOLIC BLOOD PRESSURE: 110 MMHG

## 2020-01-21 DIAGNOSIS — G24.3 CERVICAL DYSTONIA: Primary | ICD-10-CM

## 2020-01-21 DIAGNOSIS — G43.719 INTRACTABLE CHRONIC MIGRAINE WITHOUT AURA AND WITHOUT STATUS MIGRAINOSUS: Primary | ICD-10-CM

## 2020-01-21 PROCEDURE — 64616 CHEMODENERV MUSC NECK DYSTON: CPT | Performed by: PSYCHIATRY & NEUROLOGY

## 2020-01-21 NOTE — PROGRESS NOTES
Joseluis Parikh  returned today to the Botox clinic in the [address] at the [facility]  As you know, the patient is a 21 y o  male who is receiving Botox injections for cervical dystonia  The effect of the previous injection was none  No significant change in his symptoms  Side effects included none           Medications:      Current Outpatient Medications on File Prior to Visit   Medication Sig Dispense Refill    albuterol (VENTOLIN HFA) 90 mcg/act inhaler Inhale 2 puffs every 6 (six) hours as needed for wheezing 18 g 1    fluticasone (FLONASE) 50 mcg/act nasal spray SPRAY 1 SPRAY INTO EACH NOSTRIL TWICE A DAY 16 mL 0    mometasone (ELOCON) 0 1 % cream Apply topically daily 45 g 5    multivitamin (THERAGRAN) TABS Take 1 tablet by mouth daily      Probiotic Product (PROBIOTIC-10 PO) Take by mouth      rizatriptan (MAXALT-MLT) 10 MG disintegrating tablet Take 1 tab at migraine onset, can repeat once in 2 hours  Max 2 tabs in 24 hours  Max 2 days a week  9 tablet 1    verapamil (CALAN) 40 mg tablet Take half tab qhs x 1 week, then 1 tab qhs x 1 week, then half tab AM and 1 tab nightly, then 1 tab BID   (Patient not taking: Reported on 12/17/2019) 60 tablet 1     Current Facility-Administered Medications on File Prior to Visit   Medication Dose Route Frequency Provider Last Rate Last Dose    AbobotulinumtoxinA SOLR 300 Units  300 Units Intramuscular Q3 Months Ricardo Merritt MD   300 Units at 07/16/19 0921    AbobotulinumtoxinA SOLR 300 Units  300 Units Intramuscular Q3 Months Ricardo Merritt MD   300 Units at 10/22/19 1003         Focused Neurologic examination:     Slight hypertrophy in the left SCM and left shoulder trapezius       Injection details:     After sterile preparation of the skin, a total of 300 units of Dysport were injected without EMG guidance   Each 300 units was diluted in 0 6 cc of PFNS   0 units were discarded as waste      The injections were done as follows into the:  LEFT Sternocleidomastoid: 25 units (0 05) x2  LEFT Splenious capitis: 50 units (0 1ml)  LEFT Trapezius, shoulder portion: 3 injections of 75/75/50 units (0 15/0 15/0 1ml) (increase)      The procedure was well tolerated without immediate complications  The patient will return in 12 weeks for further evaluation and treatment  Prior injection was acturally 250units with 50units wasted

## 2020-01-21 NOTE — PATIENT INSTRUCTIONS
Please give me a call in one month with an update  I will talk with Dr Carl Herrera about what to do next

## 2020-01-21 NOTE — TELEPHONE ENCOUNTER
----- Message from Mehdi Alvarez MA sent at 1/21/2020  9:16 AM EST -----  Regarding: Dysport increase  Olga,    Requesting increase on patient's dysport  Please reach out with any questions  Thank you!   Araceli

## 2020-01-21 NOTE — TELEPHONE ENCOUNTER
Araceli/ Dr Ashley Mariscal,    Just to confirm at next visit patient will receive a total of 400 units instead of 300 units? Thank you!     Aaron Hoffman

## 2020-01-21 NOTE — TELEPHONE ENCOUNTER
Could someone let the patient know that I talked to Dr Cyril Rios and she recommended referral to the [facility]  Referral was placed

## 2020-02-03 ENCOUNTER — TELEPHONE (OUTPATIENT)
Dept: NEUROLOGY | Facility: CLINIC | Age: 25
End: 2020-02-03

## 2020-02-12 ENCOUNTER — HOSPITAL ENCOUNTER (OUTPATIENT)
Dept: NON INVASIVE DIAGNOSTICS | Age: 25
Discharge: HOME | End: 2020-02-12
Payer: COMMERCIAL

## 2020-02-12 DIAGNOSIS — R06.02 SOB (SHORTNESS OF BREATH): ICD-10-CM

## 2020-02-12 PROCEDURE — 93351 STRESS TTE COMPLETE: CPT | Performed by: INTERNAL MEDICINE

## 2020-02-12 PROCEDURE — 93350 STRESS TTE ONLY: CPT

## 2020-02-14 LAB
CHEST PAIN STATEMENT: NORMAL
MAX DIASTOLIC BP: 60 MMHG
MAX HEART RATE: 179 BPM
MAX PREDICTED HEART RATE: 195 BPM
MAX. SYSTOLIC BP: 144 MMHG
PROTOCOL NAME: NORMAL
REASON FOR TERMINATION: NORMAL
TARGET HR FORMULA: NORMAL
TEST INDICATION: NORMAL
TIME IN EXERCISE PHASE: NORMAL

## 2020-02-17 ENCOUNTER — OFFICE VISIT (OUTPATIENT)
Dept: NEUROLOGY | Facility: CLINIC | Age: 25
End: 2020-02-17
Payer: COMMERCIAL

## 2020-02-17 VITALS
SYSTOLIC BLOOD PRESSURE: 110 MMHG | WEIGHT: 136 LBS | DIASTOLIC BLOOD PRESSURE: 80 MMHG | HEIGHT: 66 IN | HEART RATE: 72 BPM | BODY MASS INDEX: 21.86 KG/M2

## 2020-02-17 DIAGNOSIS — G24.3 SPASMODIC TORTICOLLIS: ICD-10-CM

## 2020-02-17 DIAGNOSIS — G44.221 CHRONIC TENSION-TYPE HEADACHE, INTRACTABLE: Primary | ICD-10-CM

## 2020-02-17 DIAGNOSIS — M54.2 CERVICALGIA: ICD-10-CM

## 2020-02-17 DIAGNOSIS — G43.711 INTRACTABLE CHRONIC MIGRAINE WITHOUT AURA AND WITH STATUS MIGRAINOSUS: ICD-10-CM

## 2020-02-17 PROCEDURE — 1036F TOBACCO NON-USER: CPT | Performed by: PSYCHIATRY & NEUROLOGY

## 2020-02-17 PROCEDURE — 3008F BODY MASS INDEX DOCD: CPT | Performed by: PSYCHIATRY & NEUROLOGY

## 2020-02-17 PROCEDURE — 99214 OFFICE O/P EST MOD 30 MIN: CPT | Performed by: PSYCHIATRY & NEUROLOGY

## 2020-02-17 RX ORDER — GABAPENTIN 300 MG/1
CAPSULE ORAL
Qty: 60 CAPSULE | Refills: 2 | Status: SHIPPED | OUTPATIENT
Start: 2020-02-17 | End: 2020-03-04

## 2020-02-17 NOTE — PROGRESS NOTES
Patient ID: Anand Mohamud  is a 21 y o  male  Assessment/Plan:    No problem-specific Assessment & Plan notes found for this encounter  Diagnoses and all orders for this visit:    Chronic tension-type headache, intractable--refractory to several medications, treatments see below  -     Ambulatory referral to Neurology  -     gabapentin (NEURONTIN) 300 mg capsule; Take 1 tab nightly x 1 week, then increase to 2 tabs nightly as tolerated  Discussed potential medication adverse effects including sedation  Intractable chronic migraine without aura and with status migrainosus  -     Ambulatory referral to Neurology  -     gabapentin (NEURONTIN) 300 mg capsule; Take 1 tab nightly x 1 week, then increase to 2 tabs nightly as tolerated    Cervicalgia  -     Ambulatory referral to Neurology  -     gabapentin (NEURONTIN) 300 mg capsule; Take 1 tab nightly x 1 week, then increase to 2 tabs nightly as tolerated    Spasmodic torticollis  -  failed Dysport--no benefit and no adverse effects       Will follow up with [facility] as he has been refractory to several treatments here  He has not had significant response to any of the various categories of headache or neck pain treatment options as listed below  Discussed with patient that he may be a candidate potentially for lidocaine infusions or other treatments that are not offered in her network  I discussed seeing a sleep specialist also as he always has some form of a headache including when he wakes out of sleep in the morning  He denies any overt sleep apnea symptoms  He will discuss sleep specialist with [facility]  Will follow up on an as-needed basis  Neurologic exam remains stable  Subjective:    HPI    Mr  Davon Pak is seen in follow up for daily persistent headaches  , with likely cervical dystonia component    He has tried and failed numerous medications, see extensive list below including beta blockers, CCBs, TCAS, muscle relaxants, gabapentin, trigger point injections, Dysport for dystonia  He has had some benefit initially with gabapentin thus restarted him on a higher dose of this than prior  He does not recall a/e with this medication  States stopped working thus he stopped taking it  Continues to have headaches daily, which are rated at 4-5/10  States at least once a day 8/10 regardless of what activity he is doing  States it can last hours or lesser duration if he is able   States newly having car sickness with car rides; never had this prior  He denies any other head or neck injury  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  States the headaches disrupt his activities to some degree but is able to complete his day-to-day activities  Denies significant depression or anxiety  Exam states that he feels the pain stems from his left SCM region which is where he was initially injured  He tells me he contacted [email or web address] and they told him he may have to pay out-of-pocket  I discussed with the patient to call his insurance and let him know that we are sending him there for 2nd opinion/service is not available in our network  Certainly if needed we can talk to his insurance in regards to this  No other changes to his health history since last seen      States over the last year and have his headaches have gradually worsened  No other head injury or neck injury  He continues works 12 hours a day  He states he is trying to get into college  Denies any significant depression or anxiety or insomnia  He continues to have periodic chest pain and has had cardiac workup with nothing cardiac per him states he is now seeing pulmonology      Continues to have daily 4 to 5/10 headaches and approximately once a week severe headaches with now dosing photophobia, nausea, left eye ptosis lasting 1-2 hours now resolved with sleep and dark room  He denies any other cognitive changes or vision changes    Has had an eye exam with an ophthalmologist and cleared per them  This MRI brain was unremarkable  MRI C-spine unremarkable  Head extensive rheumatologic workup which was unremarkable  Medications tried and failed:  Since then heas tried and failed various medications including tizanidine, nortriptyline, gabapentin- initially helped but wore off- no a/e, indocin as well, right onb with some benefit transient, duloxetine cymbalta, toradol, baclofen, trigger points with minimal to no benefit, robaxin with some benefit but significant sedation  Verapamil with hypotension, beta blocker with hypotension, triptans, PT  Prednisone with no benefit, effexor not helpful, zomig, lyrica, Aimovig, Dysport (insurance did not approve Botox)     States when he has a severe headache now he has left eye droop and light headed  States this lasts about one to two hours  States if he relaxes it starts to dissipate  States it is severe, left periorbital   Frequency: couple of times a week  Photophobia, nausea, worse with activity better with sleep  Severity 9 to 10/10 states cannot do anything and just has to let it pass  States oxygen does not help     Once again denies having any type of headaches prior to his head injury 2017  MR brain and CTA H/N wnl  Other headaches:     Description: dull throbbing pain with muscle spasms in the back of the neck- on the right side  Denies photophobia, phonophobia, nausea, emesis  Severity: 4-5/10     The following portions of the patient's history were reviewed and updated as appropriate: allergies, current medications, past family history, past medical history, past social history, past surgical history and problem list and ROS         Objective:    Blood pressure 110/80, pulse 72, height 5' 6" (1 676 m), weight 61 7 kg (136 lb)  Physical Exam   Constitutional: He appears well-developed and well-nourished  HENT:   Head: Normocephalic and atraumatic     Eyes: Pupils are equal, round, and reactive to light  Conjunctivae are normal    Neck: Normal range of motion  Neck supple  Cardiovascular: Normal rate and regular rhythm  Pulmonary/Chest: Effort normal    Musculoskeletal: He exhibits tenderness (+ neck pain)  Neurological: He is alert  He has normal strength and normal reflexes  Nursing note and vitals reviewed  Neurological Exam  Mental Status  Alert  Oriented to person, place, time and situation  Recent and remote memory are intact  no dysarthria present  Language is fluent with no aphasia  Attention and concentration are normal  Fund of knowledge is appropriate for level of education  Cranial Nerves  CN II: Visual fields full to confrontation  CN III, IV, VI: Extraocular movements intact bilaterally  Pupils equal round and reactive to light bilaterally  CN V: Facial sensation is normal   CN VII: Full and symmetric facial movement  CN VIII: Hearing is normal   CN IX, X: Palate elevates symmetrically  Normal gag reflex  CN XI: Shoulder shrug strength is normal   CN XII: Tongue midline without atrophy or fasciculations  Motor   Normal muscle tone  Strength is 5/5 throughout all four extremities  Sensory  Light touch is normal in upper and lower extremities  No right-sided hemispatial neglect  No left-sided hemispatial neglect  Reflexes  Deep tendon reflexes are 2+ and symmetric in all four extremities with downgoing toes bilaterally  Coordination  Right: Finger-to-nose normal   Left: Finger-to-nose normal     Gait  Casual gait is normal including stance, stride, and arm swing  ROS:    Review of Systems   Constitutional: Negative  Negative for appetite change and fever  HENT: Negative  Negative for hearing loss, tinnitus, trouble swallowing and voice change  Eyes: Negative  Negative for photophobia and pain  Respiratory: Negative  Negative for shortness of breath  Cardiovascular: Negative  Negative for palpitations  Gastrointestinal: Negative  Negative for nausea and vomiting  Endocrine: Negative  Negative for cold intolerance and heat intolerance  Genitourinary: Negative  Negative for dysuria, frequency and urgency  Musculoskeletal: Negative  Negative for myalgias and neck pain  Skin: Negative  Negative for rash  Neurological: Positive for headaches  Negative for dizziness, tremors, seizures, syncope, facial asymmetry, speech difficulty, weakness, light-headedness and numbness  Patient states that he still have headaches everyday  Hematological: Negative  Does not bruise/bleed easily  Psychiatric/Behavioral: Negative  Negative for confusion, hallucinations and sleep disturbance

## 2020-03-04 ENCOUNTER — OFFICE VISIT (OUTPATIENT)
Dept: FAMILY MEDICINE CLINIC | Facility: HOSPITAL | Age: 25
End: 2020-03-04
Payer: COMMERCIAL

## 2020-03-04 VITALS
DIASTOLIC BLOOD PRESSURE: 82 MMHG | OXYGEN SATURATION: 98 % | WEIGHT: 137.2 LBS | HEIGHT: 66 IN | SYSTOLIC BLOOD PRESSURE: 110 MMHG | HEART RATE: 92 BPM | BODY MASS INDEX: 22.05 KG/M2

## 2020-03-04 DIAGNOSIS — R53.82 CHRONIC FATIGUE: ICD-10-CM

## 2020-03-04 DIAGNOSIS — G24.3 CERVICAL DYSTONIA: ICD-10-CM

## 2020-03-04 DIAGNOSIS — IMO0002 CHRONIC MIGRAINE: ICD-10-CM

## 2020-03-04 DIAGNOSIS — R42 DIZZINESS: Primary | ICD-10-CM

## 2020-03-04 PROBLEM — K90.0 CELIAC DISEASE: Status: ACTIVE | Noted: 2020-03-04

## 2020-03-04 PROCEDURE — 3008F BODY MASS INDEX DOCD: CPT | Performed by: INTERNAL MEDICINE

## 2020-03-04 PROCEDURE — 1036F TOBACCO NON-USER: CPT | Performed by: INTERNAL MEDICINE

## 2020-03-04 PROCEDURE — 99214 OFFICE O/P EST MOD 30 MIN: CPT | Performed by: INTERNAL MEDICINE

## 2020-03-04 RX ORDER — BOTULINUM TOXIN TYPE A 300 U/1
INJECTION, POWDER, LYOPHILIZED, FOR SOLUTION INTRAMUSCULAR
COMMUNITY
Start: 2020-01-02 | End: 2020-03-04 | Stop reason: ALTCHOICE

## 2020-03-04 RX ORDER — GABAPENTIN 100 MG/1
100 CAPSULE ORAL 2 TIMES DAILY
Qty: 60 CAPSULE | Refills: 6 | Status: SHIPPED | OUTPATIENT
Start: 2020-03-04 | End: 2021-07-12 | Stop reason: ALTCHOICE

## 2020-03-04 NOTE — PROGRESS NOTES
Assessment/Plan:             Problem List Items Addressed This Visit        Cardiovascular and Mediastinum    Chronic migraine     No real change in frequency - having daily- was having disport with neurology without improvemnt           Other Visit Diagnoses     Dizziness    -  Primary            Subjective:      Patient ID: Rancho Hummel  is a 21 y o  male    1 dizziness- started on gabapentin last 2 weeks- feeling dizzy in waves- not with change in position- taking med at bedtime- 300 mg with neurology team  Will decrease to 100 mg dose and gradually increase to see if he tolerates this better  2  Neck pain - anteriorly chronic issues- had mri in 2018- he is questioning if a emg study may be helpful to assess this  - I will defer to neurology  About this- was told to see [facility] headache clinic and psych evaluation prior to that      feels rush sensation on top of head at times like a kinked garden hose was released - feels deeper in head when that occurs       The following portions of the patient's history were reviewed and updated as appropriate: allergies, current medications and problem list      Review of Systems   Constitutional: Positive for fatigue  Working 4 hours on medical limited activity   is taking algebra at [employer] now and hoping to gradually increase course load- has some head pounding if looking at computer too long   HENT: Negative for congestion  Respiratory: Positive for shortness of breath  Negative for cough  Some mild sob with exertion- ? Referred from neck pain on left side   Cardiovascular: Negative for chest pain  Gastrointestinal: Negative for abdominal pain  All other systems reviewed and are negative          Objective:      Current Outpatient Medications:     fluticasone (FLONASE) 50 mcg/act nasal spray, SPRAY 1 SPRAY INTO EACH NOSTRIL TWICE A DAY (Patient taking differently: daily as needed ), Disp: 16 mL, Rfl: 0    gabapentin (NEURONTIN) 300 mg capsule, Take 1 tab nightly x 1 week, then increase to 2 tabs nightly as tolerated, Disp: 60 capsule, Rfl: 2    mometasone (ELOCON) 0 1 % cream, Apply topically daily, Disp: 45 g, Rfl: 5    multivitamin (THERAGRAN) TABS, Take 1 tablet by mouth daily, Disp: , Rfl:     Probiotic Product (PROBIOTIC-10 PO), Take by mouth, Disp: , Rfl:     Current Facility-Administered Medications:     AbobotulinumtoxinA SOLR 300 Units, 300 Units, Intramuscular, Q3 Months, Julieth Quintana MD, 300 Units at 07/16/19 0921    AbobotulinumtoxinA SOLR 300 Units, 300 Units, Intramuscular, Q3 Months, Julieth Quintaan MD, 300 Units at 10/22/19 1003    AbobotulinumtoxinA SOLR 300 Units, 300 Units, Intramuscular, Q3 Months, Julieth Quintana MD, 300 Units at 01/21/20 1137    Blood pressure 110/82, pulse 92, height 5' 6" (1 676 m), weight 62 2 kg (137 lb 3 2 oz), SpO2 98 %  Physical Exam   Constitutional: He is oriented to person, place, and time  He appears well-developed and well-nourished  No distress  HENT:   Head: Normocephalic  Right Ear: External ear normal    Left Ear: External ear normal    Eyes: Right eye exhibits no discharge  Left eye exhibits no discharge  Neck: No JVD present  No thyromegaly present  Cardiovascular: Normal rate and regular rhythm  Exam reveals no friction rub  No murmur heard  Pulmonary/Chest: Effort normal and breath sounds normal  No stridor  No respiratory distress  He has no wheezes  Abdominal: Soft  Bowel sounds are normal  He exhibits no distension  Musculoskeletal: Normal range of motion  He exhibits tenderness  He exhibits no edema  Left  Anterior neck tenderness   Neurological: He is alert and oriented to person, place, and time  Coordination normal    Skin: No rash noted  No erythema  Psychiatric: He has a normal mood and affect  His behavior is normal  Judgment and thought content normal    Nursing note and vitals reviewed

## 2020-03-04 NOTE — PATIENT INSTRUCTIONS
emg  For cervical issues  Do fasting labs   call your [facility] to find participating psychiatry  Call [facility] for appt

## 2020-03-12 LAB
ALBUMIN SERPL-MCNC: 4.7 G/DL (ref 4.1–5.2)
ALBUMIN/GLOB SERPL: 2.4 {RATIO} (ref 1.2–2.2)
ALP SERPL-CCNC: 51 IU/L (ref 39–117)
ALT SERPL-CCNC: 14 IU/L (ref 0–44)
AST SERPL-CCNC: 17 IU/L (ref 0–40)
BASOPHILS # BLD AUTO: 0 X10E3/UL (ref 0–0.2)
BASOPHILS NFR BLD AUTO: 0 %
BILIRUB SERPL-MCNC: 0.4 MG/DL (ref 0–1.2)
BUN SERPL-MCNC: 17 MG/DL (ref 6–20)
BUN/CREAT SERPL: 15 (ref 9–20)
CALCIUM SERPL-MCNC: 9.3 MG/DL (ref 8.7–10.2)
CHLORIDE SERPL-SCNC: 102 MMOL/L (ref 96–106)
CHOLEST SERPL-MCNC: 177 MG/DL (ref 100–199)
CO2 SERPL-SCNC: 28 MMOL/L (ref 20–29)
CREAT SERPL-MCNC: 1.13 MG/DL (ref 0.76–1.27)
EOSINOPHIL # BLD AUTO: 0 X10E3/UL (ref 0–0.4)
EOSINOPHIL NFR BLD AUTO: 1 %
ERYTHROCYTE [DISTWIDTH] IN BLOOD BY AUTOMATED COUNT: 14.1 % (ref 11.6–15.4)
GLOBULIN SER-MCNC: 2 G/DL (ref 1.5–4.5)
GLUCOSE SERPL-MCNC: 84 MG/DL (ref 65–99)
HCT VFR BLD AUTO: 47.1 % (ref 37.5–51)
HDLC SERPL-MCNC: 52 MG/DL
HGB BLD-MCNC: 15.2 G/DL (ref 13–17.7)
IMM GRANULOCYTES # BLD: 0 X10E3/UL (ref 0–0.1)
IMM GRANULOCYTES NFR BLD: 0 %
LDLC SERPL CALC-MCNC: 112 MG/DL (ref 0–99)
LDLC/HDLC SERPL: 2.2 RATIO (ref 0–3.6)
LYMPHOCYTES # BLD AUTO: 1.4 X10E3/UL (ref 0.7–3.1)
LYMPHOCYTES NFR BLD AUTO: 38 %
MCH RBC QN AUTO: 29.9 PG (ref 26.6–33)
MCHC RBC AUTO-ENTMCNC: 32.3 G/DL (ref 31.5–35.7)
MCV RBC AUTO: 93 FL (ref 79–97)
MONOCYTES # BLD AUTO: 0.4 X10E3/UL (ref 0.1–0.9)
MONOCYTES NFR BLD AUTO: 10 %
NEUTROPHILS # BLD AUTO: 1.8 X10E3/UL (ref 1.4–7)
NEUTROPHILS NFR BLD AUTO: 51 %
PLATELET # BLD AUTO: 146 X10E3/UL (ref 150–450)
POTASSIUM SERPL-SCNC: 4.3 MMOL/L (ref 3.5–5.2)
PROT SERPL-MCNC: 6.7 G/DL (ref 6–8.5)
RBC # BLD AUTO: 5.09 X10E6/UL (ref 4.14–5.8)
SL AMB EGFR AFRICAN AMERICAN: 104 ML/MIN/1.73
SL AMB EGFR NON AFRICAN AMERICAN: 90 ML/MIN/1.73
SL AMB VLDL CHOLESTEROL CALC: 13 MG/DL (ref 5–40)
SODIUM SERPL-SCNC: 144 MMOL/L (ref 134–144)
TRIGL SERPL-MCNC: 67 MG/DL (ref 0–149)
TSH SERPL DL<=0.005 MIU/L-ACNC: 1.76 UIU/ML (ref 0.45–4.5)
WBC # BLD AUTO: 3.6 X10E3/UL (ref 3.4–10.8)

## 2020-03-30 ENCOUNTER — HOSPITAL ENCOUNTER (OUTPATIENT)
Dept: NEUROLOGY | Facility: CLINIC | Age: 25
Discharge: HOME | End: 2020-03-30
Payer: COMMERCIAL

## 2020-03-30 DIAGNOSIS — G24.3 CERVICAL DYSTONIA: ICD-10-CM

## 2020-03-30 PROBLEM — G89.29 NECK PAIN, CHRONIC: Status: ACTIVE | Noted: 2018-12-14

## 2020-03-30 PROCEDURE — 95886 MUSC TEST DONE W/N TEST COMP: CPT | Performed by: PSYCHIATRY & NEUROLOGY

## 2020-03-30 PROCEDURE — 95912 NRV CNDJ TEST 11-12 STUDIES: CPT | Performed by: PSYCHIATRY & NEUROLOGY

## 2020-04-03 ENCOUNTER — TELEPHONE (OUTPATIENT)
Dept: NEUROLOGY | Facility: CLINIC | Age: 25
End: 2020-04-03

## 2020-07-02 ENCOUNTER — TRANSCRIBE ORDERS (OUTPATIENT)
Dept: ADMINISTRATIVE | Facility: HOSPITAL | Age: 25
End: 2020-07-02

## 2020-07-02 DIAGNOSIS — M54.2 CERVICALGIA: Primary | ICD-10-CM

## 2020-07-10 ENCOUNTER — TELEPHONE (OUTPATIENT)
Dept: FAMILY MEDICINE CLINIC | Facility: HOSPITAL | Age: 25
End: 2020-07-10

## 2020-07-10 DIAGNOSIS — G24.3 CERVICAL DYSTONIA: Primary | ICD-10-CM

## 2020-07-10 DIAGNOSIS — M79.18 MYOFASCIAL MUSCLE PAIN: ICD-10-CM

## 2020-07-10 DIAGNOSIS — M54.81 OCCIPITAL NEURALGIA OF RIGHT SIDE: Primary | ICD-10-CM

## 2020-07-10 NOTE — TELEPHONE ENCOUNTER
Pt called stating he saw pain dr 7/1/2020 dr dr Keira mtz do at [facility]  Note in media for dr clayton Anglin ordered mri orbit face an or neck without contrast and pt was notified that [facility] first said that dr not in network and needs dr arnold to order the mri to get it covered per patient    Can dr arnold order mri?    [phone]

## 2020-07-10 NOTE — TELEPHONE ENCOUNTER
What do you want ordered? Consult note says MRI  orbit face and or neck was ordered  We cannot order that way in Epic, only option is to order 3 separate  In their recommendation it says MRI soft tissues of neck with attention to left scalene/sternocleidomastoid  Please advise

## 2020-07-21 ENCOUNTER — TELEPHONE (OUTPATIENT)
Dept: FAMILY MEDICINE CLINIC | Facility: HOSPITAL | Age: 25
End: 2020-07-21

## 2020-07-30 NOTE — TELEPHONE ENCOUNTER
No I think Dr Tripp Garcia is the right step- if he wants additional imaging will have their office arrange

## 2020-08-25 ENCOUNTER — TELEPHONE (OUTPATIENT)
Dept: NEUROLOGY | Facility: CLINIC | Age: 25
End: 2020-08-25

## 2020-08-25 NOTE — TELEPHONE ENCOUNTER
Received request for records from [facility]    Scanned copy of request into media, attached to this encounter and faxed to [facility]

## 2021-07-12 ENCOUNTER — OFFICE VISIT (OUTPATIENT)
Dept: FAMILY MEDICINE CLINIC | Facility: HOSPITAL | Age: 26
End: 2021-07-12
Payer: COMMERCIAL

## 2021-07-12 VITALS
BODY MASS INDEX: 22.63 KG/M2 | HEART RATE: 89 BPM | SYSTOLIC BLOOD PRESSURE: 118 MMHG | HEIGHT: 66 IN | DIASTOLIC BLOOD PRESSURE: 64 MMHG | WEIGHT: 140.8 LBS | OXYGEN SATURATION: 98 %

## 2021-07-12 DIAGNOSIS — Z82.49 FAMILY HISTORY OF CARDIAC DISORDER IN GRANDFATHER: ICD-10-CM

## 2021-07-12 DIAGNOSIS — R21 RASH: Primary | ICD-10-CM

## 2021-07-12 DIAGNOSIS — L20.82 FLEXURAL ECZEMA: ICD-10-CM

## 2021-07-12 PROCEDURE — 99213 OFFICE O/P EST LOW 20 MIN: CPT | Performed by: INTERNAL MEDICINE

## 2021-07-12 PROCEDURE — 3008F BODY MASS INDEX DOCD: CPT | Performed by: INTERNAL MEDICINE

## 2021-07-12 PROCEDURE — 1036F TOBACCO NON-USER: CPT | Performed by: INTERNAL MEDICINE

## 2021-07-12 RX ORDER — CLOBETASOL PROPIONATE 0.5 MG/G
CREAM TOPICAL 2 TIMES DAILY
Qty: 45 G | Refills: 0 | Status: SHIPPED | OUTPATIENT
Start: 2021-07-12

## 2021-07-12 RX ORDER — METHYLPREDNISOLONE 4 MG/1
TABLET ORAL
Qty: 21 EACH | Refills: 0 | Status: SHIPPED | OUTPATIENT
Start: 2021-07-12

## 2021-07-12 NOTE — PROGRESS NOTES
Assessment/Plan:             Problem List Items Addressed This Visit     None      Visit Diagnoses     Rash    -  Primary    Relevant Medications    methylPREDNISolone 4 MG tablet therapy pack    clobetasol (TEMOVATE) 0 05 % cream    Flexural eczema        Relevant Medications    clobetasol (TEMOVATE) 0 05 % cream    Family history of cardiac disorder in grandfather        Relevant Orders    Comprehensive metabolic panel    Lipid Panel with Direct LDL reflex            Subjective:      Patient ID: Bianca House  is a 31 y o  male    Rash- has had similar rash when stationed in [address]  [facility] about 8 years ago and used a special soap and cram which cleared it in about a week  Now started with rash on knees at work on Friday 7/9/21- noted bumpy pimples on his knees then spread the next day- all over legs and arms  - now developing on chest and back and  Area on kness has gotten worse - has been sweating  With hot weather  Uses [employer] for laundry and no new osaps or additives or any new foods or meds  Had been working outside  With long pants earlier in week- had pulling out and trimming with weedeater with poison ivy     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 (powder x1,  Wearing short sleeves and gloves- others were liquids)  Will have him check at work what the substances were  The following portions of the patient's history were reviewed and updated as appropriate: allergies, current medications and problem list      Review of Systems   Constitutional: Negative for fever  HENT: Negative for congestion  Respiratory: Negative for cough  Musculoskeletal: Negative for arthralgias and joint swelling  Has chronic neck pain issues- slightly better but still there   Neurological:        Had stopped his neck injections about 2 years ago  and the gabapentin about a year ago       BMI Counseling: Body mass index is 22 73 kg/m²   The BMI is normal  Objective:      Current Outpatient Medications:     multivitamin (THERAGRAN) TABS, Take 1 tablet by mouth daily, Disp: , Rfl:     clobetasol (TEMOVATE) 0 05 % cream, Apply topically 2 (two) times a day, Disp: 45 g, Rfl: 0    methylPREDNISolone 4 MG tablet therapy pack, Use as directed on package, Disp: 21 each, Rfl: 0    Current Facility-Administered Medications:     AbobotulinumtoxinA SOLR 300 Units, 300 Units, Intramuscular, Q3 Months, Grisel Alejandre MD, 300 Units at 07/16/19 0921    AbobotulinumtoxinA SOLR 300 Units, 300 Units, Intramuscular, Q3 Months, Grisel Alejandre MD, 300 Units at 10/22/19 1003    AbobotulinumtoxinA SOLR 300 Units, 300 Units, Intramuscular, Q3 Months, Grisel Alejandre MD, 300 Units at 01/21/20 1137    Blood pressure 118/64, pulse 89, height 5' 6" (1 676 m), weight 63 9 kg (140 lb 12 8 oz), SpO2 98 %  Physical Exam  Vitals and nursing note reviewed  Constitutional:       Appearance: Normal appearance  HENT:      Mouth/Throat:      Mouth: Mucous membranes are dry  Pharynx: No oropharyngeal exudate or posterior oropharyngeal erythema  Eyes:      General:         Right eye: No discharge  Left eye: No discharge  Cardiovascular:      Rate and Rhythm: Normal rate and regular rhythm  Musculoskeletal:      Right lower leg: No edema  Left lower leg: No edema  Skin:     Findings: Rash present  Comments: Sandpaper type rash on arms and chest and legs- some minimal linear  Exacerbation on knees  No blistering or fluid draiange   Neurological:      Mental Status: He is alert

## 2022-04-20 ENCOUNTER — EVALUATION (OUTPATIENT)
Dept: PHYSICAL THERAPY | Facility: CLINIC | Age: 27
End: 2022-04-20
Payer: OTHER MISCELLANEOUS

## 2022-04-20 DIAGNOSIS — M79.602 LEFT ARM PAIN: Primary | ICD-10-CM

## 2022-04-20 DIAGNOSIS — M25.522 LEFT ELBOW PAIN: ICD-10-CM

## 2022-04-20 PROCEDURE — 97162 PT EVAL MOD COMPLEX 30 MIN: CPT | Performed by: PHYSICAL THERAPIST

## 2022-04-20 PROCEDURE — 97140 MANUAL THERAPY 1/> REGIONS: CPT | Performed by: PHYSICAL THERAPIST

## 2022-04-20 PROCEDURE — 97110 THERAPEUTIC EXERCISES: CPT | Performed by: PHYSICAL THERAPIST

## 2022-04-20 NOTE — PROGRESS NOTES
PT Evaluation     Today's date: 2022  Patient name: Anival Altamirano  : 1995  MRN: [id number]  Referring provider: Phuong Guillermo MD  Dx:   Encounter Diagnosis     ICD-10-CM    1  Left arm pain  M79 602    2  Left elbow pain  M25 522                   Assessment  Assessment details: Anival Altamirano  is a 31 y o  male presenting to outpatient physical therapy at [facility] with complaints of left elbow pain that began approximately one month ago when he was performing repetitive movements at work  He presents with decreased range of motion, decreased strength, limited flexibility, poor postural awareness, poor body mechanics, poor balance, decreased tolerance to activity and decreased functional mobility due to Left arm pain  (primary encounter diagnosis), and Left elbow pain  Therapist discussed diagnosis, prognosis, plan of care, proper responses to exercises, modalities to use, and Lizzy Hyatt would benefit from skilled PT services in order to address these deficits and reach maximum level of function   Thank you for the referral!  Impairments: abnormal muscle firing, abnormal muscle tone, abnormal or restricted ROM, abnormal movement, activity intolerance, impaired physical strength, lacks appropriate home exercise program, pain with function, scapular dyskinesis, weight-bearing intolerance, poor posture  and poor body mechanics    Symptom irritability: moderateUnderstanding of Dx/Px/POC: good   Prognosis: good    Goals  STGs (in 4 weeks):  1  Pt will report having at least a 50% improvement since I E    2  Pt will report having at most a 2/10 pain level with functional mobility and work related duties  3  Pt will demonstrate full AROM of LUE without onset of pain at end range  LTGs (in 12 weeks):  1  Pt will report having at least a 75% improvement since I E    2  Pt will be able to lift with LUE without onset of pain for work related tasks    3  Pt will be able to perform repetitive movements with his hands such as twisting movements with forearm without onset of pain  4  Pt will be independent with HEP  Plan  Patient would benefit from: skilled physical therapy  Planned modality interventions: TENS and unattended electrical stimulation  Planned therapy interventions: joint mobilization, manual therapy, neuromuscular re-education, patient education, self care, strengthening, stretching, therapeutic activities, therapeutic exercise, home exercise program and flexibility  Frequency: 2x week  Plan of Care beginning date: 2022  Plan of Care expiration date: 2022  Treatment plan discussed with: patient        Subjective Evaluation    History of Present Illness  Mechanism of injury: Pt is a 31year old male who presents s/p work related injury that he sustained approximately one moth ago when he was performing repetitive movements at work for approximately six days consecutively  He reports that he had increased swelling and unable to move arm  He went to Gabrielle cortez MD who is referring him to physical therapy and placed him on work restrictions  No current imaging  Now referred to skilled OP PT     Quality of life: good    Pain  Current pain ratin  At best pain ratin  At worst pain ratin  Quality: discomfort, dull ache, pressure, sharp and tight  Relieving factors: rest, relaxation and ice  Aggravating factors: lifting (repetitive movements such as twisting movements)    Hand dominance: left    Patient Goals  Patient goals for therapy: decreased pain, improved balance, increased motion, increased strength, independence with ADLs/IADLs and return to sport/leisure activities          Objective     Objective:   AROM: standing (measured in degrees); *=pain       R   L  Shoulder flexion  180   170  Shoulder abduction  180   165  Shoulder [email or web address]  TBA   TBA  Shoulder IR   TBA   TBA    Elbow Flexion   138   140  Elbow Extension  0   0    Forearm Supination  82   60  Forearm Pronation  90   75    RD    15   15  UD    43   45    Wrist Flex   90   75  Wrist Ext   55   45    STRENGTH:    R   L    Shoulder flexion  4+/5   4-/5  Shoulder abduction  4+/5   4-/5  Shoulder [email or web address]  4+/5   4-/5  Shoulder IR   4+/5   4/5    Elbow Flexion   4+/5   4-/5  Elbow Extension  4+/5   4-/5    Forearm Pronation  4+/5   4-/5  Forearm Supination  4+/5   4-/5    Wrist Flexion   4+/5   4-/5  Wrist Extension  4+/5   4-/5     strength: L2: (R) 120# (L) 110#    Posture: Pt's sitting posture is rounded shoulders and slight FHP  Jeffrey Campanile     Special Tests: (-) valgus stress test, (-) varus stress test, (-) Phalen's test, (-) Maudsley's test        Precautions: MD restrictions with lifting at work    HEP: wrist flexion and extension stretch    Specialty Daily Treatment Diary       Manual 4/20       STM/TRP medial and lateral forearm SMF       Wrist flexion and extension stretch SMF               IASTM L medial and lateral forearm SMF               Exercise Diary         UBE Retro                3 way Elbow Flexion        Ecc Wrist Flex        Ecc Wrist Ex        Ecc Wrist RD                Hammer Supination and Pronation                Therabar twists forearm pronation and supination                Therabar A/P, M/L shaking                 TB Scap Retraction        TB B S' Ext                TB ER         TB Horiz ABD                        Gripper        Diggiflex (all, individual)                 Putty (pinch, pinch and twist, pinch and pull)                  Diggiweb finger flexion        Diggiweb finger extension                Wrist flexion stretch 10 sec x 3       Wrist Extension stretch 10 sec x 3                       Wall ball circles (cw, ccw)                Scaption        Abduction                Bicep stretch                                Self tigger point release using rock ball                HEP/EDU Diagnosis, prognosis, plan of care, proper responses to exercise, HEP       Modalities        MH CP        Laser        US        Estim           Skin checks performed pre and post application

## 2022-04-26 ENCOUNTER — APPOINTMENT (OUTPATIENT)
Dept: PHYSICAL THERAPY | Facility: CLINIC | Age: 27
End: 2022-04-26
Payer: OTHER MISCELLANEOUS

## 2022-04-28 ENCOUNTER — OFFICE VISIT (OUTPATIENT)
Dept: PHYSICAL THERAPY | Facility: CLINIC | Age: 27
End: 2022-04-28
Payer: OTHER MISCELLANEOUS

## 2022-04-28 DIAGNOSIS — M25.522 LEFT ELBOW PAIN: ICD-10-CM

## 2022-04-28 DIAGNOSIS — M79.602 LEFT ARM PAIN: Primary | ICD-10-CM

## 2022-04-28 PROCEDURE — 97110 THERAPEUTIC EXERCISES: CPT

## 2022-04-28 PROCEDURE — 97112 NEUROMUSCULAR REEDUCATION: CPT

## 2022-04-28 PROCEDURE — 97140 MANUAL THERAPY 1/> REGIONS: CPT

## 2022-04-28 NOTE — PROGRESS NOTES
Daily Note     Today's date: 2022  Patient name: Stella Torres  : 1995  MRN: [id number]  Referring provider: Shira Galvan MD  Dx:   Encounter Diagnosis     ICD-10-CM    1  Left arm pain  M79 602    2  Left elbow pain  M25 522                   Subjective: Patient states his arm is feeling a little fatigued  Objective: See treatment diary below      Assessment: Tolerated treatment well  Initiated POC on UBE for warm up  Added in additional exercises to progress in strength  He demonstrated tenderness into lateral elbow, decreased slightly post manuals  He did have slight increase in symptoms with exercises, diminished with completion  Encouraged patient to use heat if he is sore  Patient demonstrated fatigue post treatment, exhibited good technique with therapeutic exercises and would benefit from continued PT      Plan: Continue per plan of care              Manual       STM/TRP medial and lateral forearm SMF RA      Wrist flexion and extension stretch SMF RA              IASTM L medial and lateral forearm SMF RA              Exercise Diary         UBE Retro  5 min              3 way Elbow Flexion        Ecc Wrist Flex        Ecc Wrist Ex        Ecc Wrist RD                Hammer Supination and Pronation  15x               Therabar twists forearm pronation and supination  5 sec x 10 Green therabar      [employer]   5 sec x 10   Green       Therabar A/P, M/L shaking                 TB Scap Retraction        TB B S' Ext                TB ER         TB Horiz ABD                        Gripper        Diggiflex (all, individual)   Green 10 laps              Putty (pinch, pinch and twist, pinch and pull)                  Diggiweb finger flexion        Diggiweb finger extension                Wrist flexion stretch 10 sec x 3 10 sec x 3       Wrist Extension stretch 10 sec x 3 10 sec x 3                       Wall ball circles (cw, ccw)                Scaption        Abduction Bicep stretch                                Self tigger point release using rock ball                HEP/EDU Diagnosis, prognosis, plan of care, proper responses to exercise, HEP       Modalities                CP        Laser        US        Estim           Skin checks performed pre and post application

## 2022-05-03 ENCOUNTER — OFFICE VISIT (OUTPATIENT)
Dept: PHYSICAL THERAPY | Facility: CLINIC | Age: 27
End: 2022-05-03
Payer: OTHER MISCELLANEOUS

## 2022-05-03 ENCOUNTER — APPOINTMENT (OUTPATIENT)
Dept: PHYSICAL THERAPY | Facility: CLINIC | Age: 27
End: 2022-05-03
Payer: OTHER MISCELLANEOUS

## 2022-05-03 DIAGNOSIS — M79.602 LEFT ARM PAIN: Primary | ICD-10-CM

## 2022-05-03 DIAGNOSIS — M25.522 LEFT ELBOW PAIN: ICD-10-CM

## 2022-05-03 PROCEDURE — 97140 MANUAL THERAPY 1/> REGIONS: CPT | Performed by: PHYSICAL THERAPIST

## 2022-05-03 PROCEDURE — 97110 THERAPEUTIC EXERCISES: CPT | Performed by: PHYSICAL THERAPIST

## 2022-05-03 NOTE — PROGRESS NOTES
Daily Note     Today's date: 5/3/2022  Patient name: Maira Culp  : 1995  MRN: [id number]  Referring provider: Moustapha Pagan MD  Dx:   Encounter Diagnosis     ICD-10-CM    1  Left arm pain  M79 602    2  Left elbow pain  M25 522                   Subjective: Pt reports that he was resuming more of his duty at work and began having increasing pain  He reports having increased pain and swelling today  Objective: See treatment diary below      Assessment: Tolerated treatment fair; initiated POC with MH to left elbow for tissue prep  MT performed after receiving consent from pt; session focused on increasing tissue mobility as well as pain modulation  Gentle stretching due to his overuse injury  He reports having some improvement post session  Concluded with MH and Estim  Provided biofreeze and instructed on proper responses avoiding skin irritation  Patient demonstrated fatigue post treatment and would benefit from continued PT      Plan: Continue per plan of care             Precautions: MD restrictions with lifting at work    HEP: wrist flexion and extension stretch    Specialty Daily Treatment Diary       Manual 4/20 4/28 5/3     STM/TRP medial and lateral forearm SMF RA SMF     Wrist flexion and extension stretch SMF RA SMF             IASTM L medial and lateral forearm SMF RA SMF             Exercise Diary         UBE Retro  5 min              3 way Elbow Flexion        Ecc Wrist Flex        Ecc Wrist Ex        Ecc Wrist RD                Hammer Supination and Pronation  15x               Therabar twists forearm pronation and supination  5 sec x 10 Green therabar      [employer]   5 sec x 10   Green       Therabar A/P, M/L shaking                 TB Scap Retraction        TB B S' Ext                TB ER         TB Horiz ABD                        Gripper        Diggiflex (all, individual)   Green 10 laps              Putty (pinch, pinch and twist, pinch and pull) Diggiweb finger flexion        Diggiweb finger extension                Wrist flexion stretch 10 sec x 3 10 sec x 3  10 sec x 5     Wrist Extension stretch 10 sec x 3 10 sec x 3  10 sec x 5                     Wall ball circles (cw, ccw)                Scaption        Abduction                Bicep stretch                                Self tigger point release using rock ball                HEP/EDU Diagnosis, prognosis, plan of care, proper responses to exercise, HEP       Modalities        MH   10 mins pre/post     CP        Laser        US        Estim   10 mins post        Skin checks performed pre and post application

## 2022-05-05 ENCOUNTER — OFFICE VISIT (OUTPATIENT)
Dept: PHYSICAL THERAPY | Facility: CLINIC | Age: 27
End: 2022-05-05
Payer: OTHER MISCELLANEOUS

## 2022-05-05 DIAGNOSIS — M79.602 LEFT ARM PAIN: Primary | ICD-10-CM

## 2022-05-05 DIAGNOSIS — M25.522 LEFT ELBOW PAIN: ICD-10-CM

## 2022-05-05 PROCEDURE — 97110 THERAPEUTIC EXERCISES: CPT | Performed by: PHYSICAL THERAPIST

## 2022-05-05 PROCEDURE — 97140 MANUAL THERAPY 1/> REGIONS: CPT | Performed by: PHYSICAL THERAPIST

## 2022-05-05 NOTE — PROGRESS NOTES
Daily Note     Today's date: 2022  Patient name: Jan Noel  : 1995  MRN: [id number]  Referring provider: Ashkan Oreilly MD  Dx:   Encounter Diagnosis     ICD-10-CM    1  Left arm pain  M79 602    2  Left elbow pain  M25 522                   Subjective: Pt reports that he went back on the machine at work today as direct by work and had increased pain in his left elbow as well as swelling  He has noticed increased burning in his right elbow as well  Objective: See treatment diary below      Assessment: Tolerated treatment well; initiated POC with MH while performing wrist flexion and extension stretching  MT performed after receiving consent from pt; gentle stretching elbow flexion/extension, forearm pronation/supination, wrist flexion/extension stretching  Concluded CP and EStim due to increased inflammation  Patient demonstrated fatigue post treatment and would benefit from continued PT      Plan: Continue per plan of care        HEP: wrist flexion and extension stretch    Specialty Daily Treatment Diary       Manual 4/20 4/28 5/3 5/5    STM/TRP medial and lateral forearm SMF RA SMF SMF    Wrist flexion and extension stretch SMF RA SMF SMF            Elbow flexion/ extension stretch; pronation/ supination    SMF ea            IASTM L medial and lateral forearm SMF RA SMF SMF            Exercise Diary         UBE Retro  5 min              3 way Elbow Flexion        Ecc Wrist Flex        Ecc Wrist Ex        Ecc Wrist RD                Hammer Supination and Pronation  15x               Therabar twists forearm pronation and supination  5 sec x 10 Green therabar      [employer]   5 sec x 10   Green       Therabar A/P, M/L shaking                 TB Scap Retraction        TB B S' Ext                TB ER         TB Horiz ABD                        Gripper        Diggiflex (all, individual)   Green 10 laps              Putty (pinch, pinch and twist, pinch and pull) Diggiweb finger flexion        Diggiweb finger extension                Wrist flexion stretch 10 sec x 3 10 sec x 3  10 sec x 5 10 sec x 5    Wrist Extension stretch 10 sec x 3 10 sec x 3  10 sec x 5 10 sec x 5                     Wall ball circles (cw, ccw)                Scaption        Abduction                Bicep stretch                                Self tigger point release using rock ball                HEP/EDU Diagnosis, prognosis, plan of care, proper responses to exercise, HEP       Modalities        MH   10 mins pre/post 10 mins pre    CP    10 mins post    Laser        US        Estim   10 mins post 10 mins post

## 2022-05-10 ENCOUNTER — APPOINTMENT (OUTPATIENT)
Dept: PHYSICAL THERAPY | Facility: CLINIC | Age: 27
End: 2022-05-10
Payer: OTHER MISCELLANEOUS

## 2022-05-12 ENCOUNTER — OFFICE VISIT (OUTPATIENT)
Dept: PHYSICAL THERAPY | Facility: CLINIC | Age: 27
End: 2022-05-12
Payer: OTHER MISCELLANEOUS

## 2022-05-12 DIAGNOSIS — M79.602 LEFT ARM PAIN: Primary | ICD-10-CM

## 2022-05-12 DIAGNOSIS — M25.522 LEFT ELBOW PAIN: ICD-10-CM

## 2022-05-12 PROCEDURE — 97140 MANUAL THERAPY 1/> REGIONS: CPT

## 2022-05-12 PROCEDURE — 97112 NEUROMUSCULAR REEDUCATION: CPT

## 2022-05-12 PROCEDURE — 97110 THERAPEUTIC EXERCISES: CPT

## 2022-05-12 PROCEDURE — 97014 ELECTRIC STIMULATION THERAPY: CPT

## 2022-05-12 NOTE — PROGRESS NOTES
Daily Note     Today's date: 2022  Patient name: Jan Noel  : 1995  MRN: [id number]  Referring provider: Ashkan Oreilly MD  Dx:   Encounter Diagnosis     ICD-10-CM    1  Left arm pain  M79 602    2  Left elbow pain  M25 522                   Subjective: Patient states he is doing better  He is back to work but doing minimal activity  Objective: See treatment diary below      Assessment: Tolerated treatment well  Initiated POC on MHP  Resumed with exercises, he was challenged with this  He did report discomfort, diminished with completion  Patient demonstrated fatigue post treatment, exhibited good technique with therapeutic exercises and would benefit from continued PT      Plan: Continue per plan of care        HEP: wrist flexion and extension stretch    Specialty Daily Treatment Diary       Manual 4/20 4/28 5/3 5/5 5/12   STM/TRP medial and lateral forearm SMF RA SMF SMF RA   Wrist flexion and extension stretch SMF RA SMF SMF RA           Elbow flexion/ extension stretch; pronation/ supination    SMF ea RA           IASTM L medial and lateral forearm SMF RA SMF SMF            Exercise Diary         UBE Retro  5 min              3 way Elbow Flexion        Ecc Wrist Flex        Ecc Wrist Ex        Ecc Wrist RD                Hammer Supination and Pronation  15x    15x            Therabar twists forearm pronation and supination  5 sec x 10 Green therabar   5 sec x 10 green therabar   Therabar smiles   5 sec x 10   Green    5 sec x 10 green   Therabar A/P, M/L shaking                 TB Scap Retraction        TB B S' Ext                TB ER         TB Horiz ABD                        Gripper        Diggiflex (all, individual)   Green 10 laps   Green 10 laps            Putty (pinch, pinch and twist, pinch and pull)                  Diggiweb finger flexion        Diggiweb finger extension                Wrist flexion stretch 10 sec x 3 10 sec x 3  10 sec x 5 10 sec x 5 10 sec x 5    Wrist Extension stretch 10 sec x 3 10 sec x 3  10 sec x 5 10 sec x 5  10 sec x 5                    Wall ball circles (cw, ccw)                Scaption        Abduction                Bicep stretch                                Self tigger point release using rock ball                HEP/EDU Diagnosis, prognosis, plan of care, proper responses to exercise, HEP       Modalities        MH   10 mins pre/post 10 mins pre 10 min pre   CP    10 mins post    Laser        US        Estim   10 mins post 10 mins post 10 min

## 2022-05-17 ENCOUNTER — APPOINTMENT (OUTPATIENT)
Dept: PHYSICAL THERAPY | Facility: CLINIC | Age: 27
End: 2022-05-17
Payer: OTHER MISCELLANEOUS

## 2022-05-17 ENCOUNTER — EVALUATION (OUTPATIENT)
Dept: PHYSICAL THERAPY | Facility: CLINIC | Age: 27
End: 2022-05-17
Payer: OTHER MISCELLANEOUS

## 2022-05-17 DIAGNOSIS — M25.522 LEFT ELBOW PAIN: ICD-10-CM

## 2022-05-17 DIAGNOSIS — M79.602 LEFT ARM PAIN: Primary | ICD-10-CM

## 2022-05-17 PROCEDURE — 97110 THERAPEUTIC EXERCISES: CPT

## 2022-05-17 PROCEDURE — 97140 MANUAL THERAPY 1/> REGIONS: CPT

## 2022-05-17 PROCEDURE — 97112 NEUROMUSCULAR REEDUCATION: CPT

## 2022-05-17 NOTE — PROGRESS NOTES
PT Evaluation     Today's date: 2022  Patient name: Jos Dutton  : 1995  MRN: [id number]  Referring provider: Linwood Saunders MD  Dx:   Encounter Diagnosis     ICD-10-CM    1  Left arm pain  M79 602    2  Left elbow pain  M25 522                   Assessment  Assessment details: Jos Dutton  is a 31 y o  male presenting to outpatient physical therapy at [facility] with complaints of left elbow pain  when he was performing repetitive movements at work  He was on light duty at work but then returned to full duty which resulted in increased pain in his elbow and reports having progressively more pain in his right elbow  He saw workman's comp MD who put him back on light duty  He presents with continued decreased range of motion, decreased strength, limited flexibility, poor postural awareness, poor body mechanics, poor balance, decreased tolerance to activity and decreased functional mobility due to Left arm pain  (primary encounter diagnosis), and Left elbow pain  With being on light duty, he has noticed decreasing pain and swelling as well as relief with skilled OP PT  Therapist discussed diagnosis, prognosis, plan of care, proper responses to exercises, modalities to use, and Gabriella Zimmer would benefit from skilled PT services in order to address these deficits and reach maximum level of function   Thank you for the referral!  Impairments: abnormal muscle firing, abnormal muscle tone, abnormal or restricted ROM, abnormal movement, activity intolerance, impaired physical strength, lacks appropriate home exercise program, pain with function, scapular dyskinesis, weight-bearing intolerance, poor posture  and poor body mechanics    Symptom irritability: moderateUnderstanding of Dx/Px/POC: good   Prognosis: good    Goals  STGs (in 4 weeks):  1  Pt will report having at least a 50% improvement since I E  - progressing towards  2   Pt will report having at most a 2/10 pain level with functional mobility and work related duties  - progressing towards  3  Pt will demonstrate full AROM of LUE without onset of pain at end range  - progressing towards    LTGs (in 12 weeks):  1  Pt will report having at least a 75% improvement since I E  - progressing towrads  2  Pt will be able to lift with LUE without onset of pain for work related tasks  - progressing toward  3  Pt will be able to perform repetitive movements with his hands such as twisting movements with forearm without onset of pain  - progression toward  4  Pt will be independent with HEP  - progressing toward     Plan  Patient would benefit from: skilled physical therapy  Planned modality interventions: TENS and unattended electrical stimulation  Planned therapy interventions: joint mobilization, manual therapy, neuromuscular re-education, patient education, self care, strengthening, stretching, therapeutic activities, therapeutic exercise, home exercise program and flexibility  Frequency: 2x week  Plan of Care beginning date: 2022  Plan of Care expiration date: 2022  Treatment plan discussed with: patient        Subjective Evaluation    History of Present Illness    RE: 2022: Pt reports that his pain is slowly improving with being placed back on light duty  He had an exacerbation of sxs when he was placed back on full duty at work  Workman's comp MD has recommended light duty again due to increasing pain  Mechanism of injury: Pt is a 31year old male who presents s/p work related injury that he sustained approximately one moth ago when he was performing repetitive movements at work for approximately six days consecutively  He reports that he had increased swelling and unable to move arm  He went to Gabrielle cortez MD who is referring him to physical therapy and placed him on work restrictions  No current imaging  Now referred to skilled OP PT     Quality of life: good    Pain  Current pain rating: 3  At best pain ratin  At worst pain ratin  Quality: discomfort, dull ache, pressure, sharp and tight  Relieving factors: rest, relaxation and ice  Aggravating factors: lifting (repetitive movements such as twisting movements)    Hand dominance: left    Patient Goals  Patient goals for therapy: decreased pain, improved balance, increased motion, increased strength, independence with ADLs/IADLs and return to sport/leisure activities          Objective     Objective:   AROM: standing (measured in degrees); *=pain       R   L  Shoulder flexion  180   170  Shoulder abduction  180   165  Shoulder [email or web address]  TBA   TBA  Shoulder IR   TBA   TBA    Elbow Flexion   138   140  Elbow Extension  0   0    Forearm Supination  82   60  Forearm Pronation  90   75    RD    15   15  UD    43   45    Wrist Flex   90   75  Wrist Ext   55   45    STRENGTH:    R   L    Shoulder flexion  4+/5   4-/5  Shoulder abduction  4+/5   4-/5  Shoulder [email or web address]  4+/5   4-/5  Shoulder IR   4+/5   4/5    Elbow Flexion   4+/5   4-/5  Elbow Extension  4+/5   4-/5    Forearm Pronation  4+/5   4-/5  Forearm Supination  4+/5   4-/5    Wrist Flexion   4+/5   4-/5  Wrist Extension  4+/5   4-/5     strength: L2: (R) 120# (L) 110#    Posture: Pt's sitting posture is rounded shoulders and slight FHP  Diannaarite Turton     Special Tests: (-) valgus stress test, (-) varus stress test, (-) Phalen's test, (-) Maudsley's test        Precautions: MD restrictions with lifting at work    HEP: wrist flexion and extension stretch    HEP: wrist flexion and extension stretch    Specialty Daily Treatment Diary       Manual 5/17  5/3 5/5 5/12   STM/TRP medial and lateral forearm RA  SMF SMF RA   Wrist flexion and extension stretch RA  SMF SMF RA           Elbow flexion/ extension stretch; pronation/ supination    SMF ea RA           IASTM L medial and lateral forearm RA  SMF SMF            Exercise Diary         UBE Retro                3 way Elbow Flexion        Ecc Wrist Flex        Ecc Wrist Ex        Ecc Wrist RD Hammer Supination and Pronation     15x            Therabar twists forearm pronation and supination 5 sec x 10 green therabar    5 sec x 10 green therabar   Therabar smiles  5 sec x 10 green    5 sec x 10 green   Therabar A/P, M/L shaking                 TB Scap Retraction        TB B S' Ext                TB ER         TB Horiz ABD                        Gripper        Diggiflex (all, individual)  Green 10 laps    Green 10 laps            Putty (pinch, pinch and twist, pinch and pull)                  Diggiweb finger flexion        Diggiweb finger extension                Wrist flexion stretch   10 sec x 5 10 sec x 5 10 sec x 5    Wrist Extension stretch   10 sec x 5 10 sec x 5  10 sec x 5                    Wall ball circles (cw, ccw)                Scaption        Abduction                Bicep stretch                                Self tigger point release using rock ball                HEP/EDU        Modalities        MH 10 min pre  10 mins pre/post 10 mins pre 10 min pre   CP    10 mins post    Laser        US        Estim   10 mins post 10 mins post 10 min

## 2022-05-18 PROCEDURE — 97014 ELECTRIC STIMULATION THERAPY: CPT

## 2022-05-19 ENCOUNTER — APPOINTMENT (OUTPATIENT)
Dept: PHYSICAL THERAPY | Facility: CLINIC | Age: 27
End: 2022-05-19
Payer: OTHER MISCELLANEOUS

## 2022-05-24 ENCOUNTER — APPOINTMENT (OUTPATIENT)
Dept: PHYSICAL THERAPY | Facility: CLINIC | Age: 27
End: 2022-05-24
Payer: OTHER MISCELLANEOUS

## 2022-05-26 ENCOUNTER — OFFICE VISIT (OUTPATIENT)
Dept: PHYSICAL THERAPY | Facility: CLINIC | Age: 27
End: 2022-05-26
Payer: OTHER MISCELLANEOUS

## 2022-05-26 DIAGNOSIS — M25.522 LEFT ELBOW PAIN: ICD-10-CM

## 2022-05-26 DIAGNOSIS — M79.602 LEFT ARM PAIN: Primary | ICD-10-CM

## 2022-05-26 PROCEDURE — 97140 MANUAL THERAPY 1/> REGIONS: CPT

## 2022-05-26 PROCEDURE — 97110 THERAPEUTIC EXERCISES: CPT

## 2022-05-26 PROCEDURE — 97112 NEUROMUSCULAR REEDUCATION: CPT

## 2022-05-26 PROCEDURE — 97014 ELECTRIC STIMULATION THERAPY: CPT

## 2022-05-26 NOTE — PROGRESS NOTES
Daily Note     Today's date: 2022  Patient name: Melissa Yarbrough  : 1995  MRN: [id number]  Referring provider: Tunde De Santiago MD  Dx:   Encounter Diagnosis     ICD-10-CM    1  Left arm pain  M79 602    2  Left elbow pain  M25 522                   Subjective: Patient states he is doing well  Objective: See treatment diary below      Assessment: Tolerated treatment well  Initiated POC on UBE for warmup  Continued to progress patient through TE  Added in bicep curls 3 way  He continues to respond well to e-stim  Continue to progress as able  Patient demonstrated fatigue post treatment, exhibited good technique with therapeutic exercises and would benefit from continued PT      Plan: Continue per plan of care        HEP: wrist flexion and extension stretch    Specialty Daily Treatment Diary         Manual    STM/TRP medial and lateral forearm RA RA  SMF RA   Wrist flexion and extension stretch RA RA  SMF RA           Elbow flexion/ extension stretch; pronation/ supination    SMF ea RA           IASTM L medial and lateral forearm RA RA  SMF            Exercise Diary         UBE Retro  5 min retro              3 way Elbow Flexion  4# 20 ea      Ecc Wrist Flex        Ecc Wrist Ex        Ecc Wrist RD                Hammer Supination and Pronation     15x            Therabar twists forearm pronation and supination 5 sec x 10 green therabar 5 sec x 10 green therabar   5 sec x 10 green therabar   Therabar smiles  5 sec x 10 green 5 sec x 10 green   5 sec x 10 green   Therabar A/P, M/L shaking                 TB Scap Retraction        TB B S' Ext                TB ER         TB Horiz ABD                        Gripper  35# 5 sec x 15      Diggiflex (all, individual)  Green 10 laps DC    Green 10 laps            Putty (pinch, pinch and twist, pinch and pull)                  Diggiweb finger flexion        Diggiweb finger extension                Wrist flexion stretch  20 sec x 3   10 sec x 5 10 sec x 5    Wrist Extension stretch  20 sec x 3   10 sec x 5  10 sec x 5                    Wall ball circles (cw, ccw)                Scaption        Abduction                Bicep stretch                                Self tigger point release using rock ball                HEP/EDU        Modalities        MH 10 min pre   10 mins pre 10 min pre   CP    10 mins post    Laser        US        Estim  10 min post  10 mins post 10 min

## 2022-05-31 ENCOUNTER — OFFICE VISIT (OUTPATIENT)
Dept: PHYSICAL THERAPY | Facility: CLINIC | Age: 27
End: 2022-05-31
Payer: OTHER MISCELLANEOUS

## 2022-05-31 DIAGNOSIS — M25.522 LEFT ELBOW PAIN: ICD-10-CM

## 2022-05-31 DIAGNOSIS — M79.602 LEFT ARM PAIN: Primary | ICD-10-CM

## 2022-05-31 PROCEDURE — 97014 ELECTRIC STIMULATION THERAPY: CPT

## 2022-05-31 PROCEDURE — 97140 MANUAL THERAPY 1/> REGIONS: CPT

## 2022-05-31 PROCEDURE — 97110 THERAPEUTIC EXERCISES: CPT

## 2022-05-31 PROCEDURE — 97112 NEUROMUSCULAR REEDUCATION: CPT

## 2022-05-31 NOTE — PROGRESS NOTES
Daily Note     Today's date: 2022  Patient name: Isabel Sánchez  : 1995  MRN: [id number]  Referring provider: Annika Constantino MD  Dx:   Encounter Diagnosis     ICD-10-CM    1  Left arm pain  M79 602    2  Left elbow pain  M25 522                   Subjective: Patient said he is off all restrictions and is back to work full swing  Objective: See treatment diary below      Assessment: Tolerated treatment well  Continued to progress patient through exercises today  Added in theraband scap retraction  Will continue to add in additional exercises for strength next visit  Patient demonstrated fatigue post treatment, exhibited good technique with therapeutic exercises and would benefit from continued PT      Plan: Continue per plan of care        HEP: wrist flexion and extension stretch    Specialty Daily Treatment Diary           Manual    STM/TRP medial and lateral forearm RA RA   RA   Wrist flexion and extension stretch RA RA RA  RA           Elbow flexion/ extension stretch; pronation/ supination     RA           IASTM L medial and lateral forearm RA RA RA             Exercise Diary         UBE Retro  5 min retro 5 min retro             3 way Elbow Flexion  4# 20 ea 4# 20 ea     Ecc Wrist Flex        Ecc Wrist Ex        Ecc Wrist RD                Hammer Supination and Pronation     15x            Therabar twists forearm pronation and supination 5 sec x 10 green therabar 5 sec x 10 green therabar 5 sec x 15 green therabar  5 sec x 10 green therabar   Therabar smiles  5 sec x 10 green 5 sec x 10 green 5 sec x 15 green  5 sec x 10 green   Therabar A/P, M/L shaking                 TB Scap Retraction   GTB 20x     TB B S' Ext                TB ER         TB Horiz ABD                        Gripper  35# 5 sec x 15 35# 5 sec x 20     Diggiflex (all, individual)  Green 10 laps DC    Green 10 laps            Putty (pinch, pinch and twist, pinch and pull)                  Diggiweb finger flexion        Diggiweb finger extension                Wrist flexion stretch  20 sec x 3  20 sec x 3  10 sec x 5    Wrist Extension stretch  20 sec x 3   20 sec x 3   10 sec x 5                    Wall ball circles (cw, ccw)                Scaption        Abduction                Bicep stretch                                Self tigger point release using rock ball                HEP/EDU        Modalities        MH 10 min pre    10 min pre   CP        Laser        US        Estim  10 min post 10 min post   10 min

## 2022-06-02 ENCOUNTER — OFFICE VISIT (OUTPATIENT)
Dept: PHYSICAL THERAPY | Facility: CLINIC | Age: 27
End: 2022-06-02
Payer: OTHER MISCELLANEOUS

## 2022-06-02 DIAGNOSIS — M79.602 LEFT ARM PAIN: Primary | ICD-10-CM

## 2022-06-02 DIAGNOSIS — M25.522 LEFT ELBOW PAIN: ICD-10-CM

## 2022-06-02 PROCEDURE — 97140 MANUAL THERAPY 1/> REGIONS: CPT | Performed by: PHYSICAL THERAPIST

## 2022-06-02 PROCEDURE — 97110 THERAPEUTIC EXERCISES: CPT | Performed by: PHYSICAL THERAPIST

## 2022-06-02 NOTE — PROGRESS NOTES
Daily Note     Today's date: 2022  Patient name: Declan Moreno  : 1995  MRN: [id number]  Referring provider: Blas Burdick MD  Dx:   Encounter Diagnosis     ICD-10-CM    1  Left arm pain  M79 602    2  Left elbow pain  M25 522                   Subjective: Patient states his elbow is flared up today, pt requests to skip Estim today      Objective: See treatment diary below      Assessment: Tolerated treatment well  Continued to progress patient through exercises today  Verbal & tactile cues required for proper execution of wrist flexor stretches  Patient demonstrated fatigue post treatment, exhibited good technique with therapeutic exercises and would benefit from continued PT      Plan: Continue per plan of care        HEP: wrist flexion and extension stretch    Specialty Daily Treatment Diary           Manual     STM/TRP medial and lateral forearm RA RA  NS    Wrist flexion and extension stretch RA RA RA NS            Elbow flexion/ extension stretch; pronation/ supination    NS            IASTM L medial and lateral forearm RA RA RA NS            Exercise Diary         UBE Retro  5 min retro 5 min retro 5 min retro            3 way Elbow Flexion  4# 20 ea 4# 20 ea     Ecc Wrist Flex        Ecc Wrist Ex        Ecc Wrist RD                Hammer Supination and Pronation    15x ea            Therabar twists forearm pronation and supination 5 sec x 10 green therabar 5 sec x 10 green therabar 5 sec x 15 green therabar 5 sec x 10 green therabar    Therabar smiles  5 sec x 10 green 5 sec x 10 green 5 sec x 15 green 5 sec x 10 green    Therabar A/P, M/L shaking                 TB Scap Retraction   GTB 20x nv    TB B S' Ext                TB ER         TB Horiz ABD                        Gripper  35# 5 sec x 15 35# 5 sec x 20     Diggiflex (all, individual)  Green 10 laps DC   Green 10 laps             Putty (pinch, pinch and twist, pinch and pull)                  Diggiweb finger flexion        Diggiweb finger extension                Wrist flexion stretch  20 sec x 3  20 sec x 3 10 sec x 5     Wrist Extension stretch  20 sec x 3   20 sec x 3  10 sec x 5                     Wall ball circles (cw, ccw)                Scaption        Abduction                Bicep stretch                                Self tigger point release using rock ball                HEP/EDU        Modalities        MH 10 min pre       CP        Laser        US        Estim  10 min post 10 min post  nv

## 2022-06-07 ENCOUNTER — OFFICE VISIT (OUTPATIENT)
Dept: PHYSICAL THERAPY | Facility: CLINIC | Age: 27
End: 2022-06-07
Payer: OTHER MISCELLANEOUS

## 2022-06-07 DIAGNOSIS — M25.522 LEFT ELBOW PAIN: ICD-10-CM

## 2022-06-07 DIAGNOSIS — M79.602 LEFT ARM PAIN: Primary | ICD-10-CM

## 2022-06-07 PROCEDURE — 97112 NEUROMUSCULAR REEDUCATION: CPT

## 2022-06-07 PROCEDURE — 97140 MANUAL THERAPY 1/> REGIONS: CPT

## 2022-06-07 PROCEDURE — 97110 THERAPEUTIC EXERCISES: CPT

## 2022-06-07 NOTE — PROGRESS NOTES
Daily Note     Today's date: 2022  Patient name: Sofya Dacosta  : 1995  MRN: [id number]  Referring provider: Aryan Messer MD  Dx:   Encounter Diagnosis     ICD-10-CM    1  Left arm pain  M79 602    2  Left elbow pain  M25 522                   Subjective: Patient states he is still off all restriction at work  Objective: See treatment diary below      Assessment: Tolerated treatment well  Progressed patient through all TE today to further focus on building up strength, demonstrated challenge  He presents with mild tenderness and has improved significnatly since IE, consider discharging STM  Patient demonstrated fatigue post treatment, exhibited good technique with therapeutic exercises and would benefit from continued PT      Plan: Continue per plan of care        HEP: wrist flexion and extension stretch    Specialty Daily Treatment Diary           Manual     STM/TRP medial and lateral forearm DC RA  NS RA   Wrist flexion and extension stretch At home RA RA NS            Elbow flexion/ extension stretch; pronation/ supination    NS            IASTM L medial and lateral forearm DC RA RA NS            Exercise Diary         UBE Retro  5 min retro 5 min retro 5 min retro 5 min retro            3 way Elbow Flexion  4# 20 ea 4# 20 ea  4# 20 ea   Ecc Wrist Flex        Ecc Wrist Ex        Ecc Wrist RD                Hammer Supination and Pronation    15x ea Hammer + 2# ankle weight  10x            Therabar twists forearm pronation and supination  5 sec x 10 green therabar 5 sec x 15 green therabar 5 sec x 10 green therabar 5 sec x 15 green    Therabar smiles   5 sec x 10 green 5 sec x 15 green 5 sec x 10 green 5 sec x 15 green    Therabar A/P, M/L shaking                 TB Scap Retraction   GTB 20x nv GTB 30   TB B S' Ext     GTB 30           TB ER      GTB 30   TB Horiz ABD     GTB 15                   Gripper  35# 5 sec x 15 35# 5 sec x 20     Diggiflex (all, individual)   DC Green 10 laps             Putty (pinch, pinch and twist, pinch and pull)                  Diggiweb finger flexion        Diggiweb finger extension                Wrist flexion stretch  20 sec x 3  20 sec x 3 10 sec x 5     Wrist Extension stretch  20 sec x 3   20 sec x 3  10 sec x 5                     Wall ball circles (cw, ccw)                Scaption        Abduction                Bicep stretch                                Self tigger point release using rock ball                HEP/EDU        Modalities        MH        CP        Laser        US        Estim  10 min post 10 min post  nv

## 2022-06-09 ENCOUNTER — OFFICE VISIT (OUTPATIENT)
Dept: PHYSICAL THERAPY | Facility: CLINIC | Age: 27
End: 2022-06-09
Payer: OTHER MISCELLANEOUS

## 2022-06-09 DIAGNOSIS — M25.522 LEFT ELBOW PAIN: ICD-10-CM

## 2022-06-09 DIAGNOSIS — M79.602 LEFT ARM PAIN: Primary | ICD-10-CM

## 2022-06-09 PROCEDURE — 97110 THERAPEUTIC EXERCISES: CPT

## 2022-06-09 NOTE — PROGRESS NOTES
Daily Note     Today's date: 2022  Patient name: Melissa Yarbrough  : 1995  MRN: [id number]  Referring provider: Tunde De Santiago MD  Dx:   Encounter Diagnosis     ICD-10-CM    1  Left arm pain  M79 602    2  Left elbow pain  M25 522                   Subjective: Patient states his pain has been improving  Objective: See treatment diary below      Assessment: Tolerated treatment well  Continued to progress patient with additional exercises  Will trial no manuals this visit as he is demonstrating improvements with pain  Will follow up next visit  Patient demonstrated fatigue post treatment, exhibited good technique with therapeutic exercises and would benefit from continued PT      Plan: Continue per plan of care        HEP: wrist flexion and extension stretch    Specialty Daily Treatment Diary               Manual    STM/TRP medial and lateral forearm DC   NS RA   Wrist flexion and extension stretch At home   NS            Elbow flexion/ extension stretch; pronation/ supination    NS            IASTM L medial and lateral forearm DC   NS            Exercise Diary         UBE Retro  5 min rerto  5 min retro 5 min retro            3 way Elbow Flexion 4# 20 4# 30   4# 20 ea   Ecc Wrist Flex        Ecc Wrist Ex        Ecc Wrist RD                Hammer Supination and Pronation  Hammer + 2# ankle weight  15x   15x ea Hammer + 2# ankle weight  10x            Therabar twists forearm pronation and supination  5 sec x 15  Green    5 sec x 10 green therabar 5 sec x 15 green    Therabar smiles   5 sec x 15 green   5 sec x 10 green 5 sec x 15 green    Therabar A/P, M/L shaking                 TB Scap Retraction  GTB 30  nv GTB 30   TB B S' Ext  GTB 30   GTB 30           TB ER   GTB 30   GTB 30   TB Horiz ABD  GTB 30   GTB 15                   Gripper   5 sec x 15       Diggiflex (all, individual)     Green 10 laps             Putty (pinch, pinch and twist, pinch and pull) Diggiweb finger flexion  5 sec x 10       Diggiweb finger extension                Wrist flexion stretch    10 sec x 5     Wrist Extension stretch    10 sec x 5                     Wall ball circles (cw, ccw)                Scaption        Abduction                Bicep stretch                                Self tigger point release using rock ball                HEP/EDU        Modalities        MH        CP        Laser        US        Estim    nv

## 2022-06-14 ENCOUNTER — APPOINTMENT (OUTPATIENT)
Dept: PHYSICAL THERAPY | Facility: CLINIC | Age: 27
End: 2022-06-14
Payer: OTHER MISCELLANEOUS

## 2022-06-16 ENCOUNTER — OFFICE VISIT (OUTPATIENT)
Dept: PHYSICAL THERAPY | Facility: CLINIC | Age: 27
End: 2022-06-16
Payer: OTHER MISCELLANEOUS

## 2022-06-16 DIAGNOSIS — M79.602 LEFT ARM PAIN: Primary | ICD-10-CM

## 2022-06-16 DIAGNOSIS — M25.522 LEFT ELBOW PAIN: ICD-10-CM

## 2022-06-16 PROCEDURE — 97112 NEUROMUSCULAR REEDUCATION: CPT

## 2022-06-16 PROCEDURE — 97110 THERAPEUTIC EXERCISES: CPT

## 2022-06-16 PROCEDURE — 97140 MANUAL THERAPY 1/> REGIONS: CPT

## 2022-06-16 NOTE — PROGRESS NOTES
Daily Note     Today's date: 2022  Patient name: Rosy Hall  : 1995  MRN: [id number]  Referring provider: Cora Calderon MD  Dx:   Encounter Diagnosis     ICD-10-CM    1  Left arm pain  M79 602    2  Left elbow pain  M25 522                   Subjective: Patient states he is feeling pain in elbow today due to overuse at work  Objective: See treatment diary below      Assessment: Tolerated treatment fair  Initiated POC on UBE for active warmup  Continued to progress patient through TE today  He was however having some increse pain with activity secondary to subjective  Added e-stim back into POC per patient complaint of pain  Also continued e-stim  Patient demonstrated fatigue post treatment, exhibited good technique with therapeutic exercises and would benefit from continued PT      Plan: Continue per plan of care        HEP: wrist flexion and extension stretch    Specialty Daily Treatment Diary               Manual    STM/TRP medial and lateral forearm     RA           IASTM L medial and lateral forearm                Exercise Diary         UBE Retro  5 min rerto 5 min retro  5 min retro            3 way Elbow Flexion 4# 20 4# 30 pain  4# 20 ea   Ecc Wrist Flex        Ecc Wrist Ex        Ecc Wrist RD                Hammer Supination and Pronation  Hammer + 2# ankle weight  15x  pain  Hammer + 2# ankle weight  10x            Therabar twists forearm pronation and supination  5 sec x 15  Green   5 sec x 15  Green    5 sec x 15 green    Therabar smiles   5 sec x 15 green  5 sec x 15 green  5 sec x 15 green    Therabar A/P, M/L shaking                 TB Scap Retraction  GTB 30 BTB 30  GTB 30   TB B S' Ext  GTB 30 BTB 30  GTB 30           TB ER   GTB 30 BTB 30  GTB 30   TB Horiz ABD  GTB 30 BTB 30  GTB 15                   Gripper   5 sec x 15  5 sec x 15 70#     Diggiflex (all, individual)                 Putty (pinch, pinch and twist, pinch and pull) Diggiweb finger flexion  5 sec x 10  Red 5 sec x 10 ea     Diggiweb finger extension   Red 5 sec x 10              Wrist flexion stretch        Wrist Extension stretch                        Wall ball circles (cw, ccw)                Scaption        Abduction                        HEP/EDU        Modalities        MH        CP        Laser        US        Estim    nv

## 2022-06-21 ENCOUNTER — OFFICE VISIT (OUTPATIENT)
Dept: PHYSICAL THERAPY | Facility: CLINIC | Age: 27
End: 2022-06-21
Payer: OTHER MISCELLANEOUS

## 2022-06-21 DIAGNOSIS — M25.522 LEFT ELBOW PAIN: ICD-10-CM

## 2022-06-21 DIAGNOSIS — M79.602 LEFT ARM PAIN: Primary | ICD-10-CM

## 2022-06-21 PROCEDURE — 97112 NEUROMUSCULAR REEDUCATION: CPT | Performed by: PHYSICAL THERAPIST

## 2022-06-21 PROCEDURE — 97110 THERAPEUTIC EXERCISES: CPT | Performed by: PHYSICAL THERAPIST

## 2022-06-21 NOTE — PROGRESS NOTES
Daily Note     Today's date: 2022  Patient name: Adam Diaz  : 1995  MRN: [id number]  Referring provider: Dee Dee Herrera MD  Dx:   Encounter Diagnosis     ICD-10-CM    1  Left arm pain  M79 602    2  Left elbow pain  M25 522                   Subjective: Patient reports that he does not have pain tonight  Objective: See treatment diary below      Assessment: Tolerated treatment well; initiated POC with UBE for active warmup  VCs provided on proper sequencing with exercises; added hammer RD/UD  He was more challenged with eccentric control with  strength  He denies having increased pain throughout session  Discussed plan of care with progressing mid back strengthening  Patient demonstrated fatigue post treatment and would benefit from continued PT      Plan: Continue per plan of care           HEP: wrist flexion and extension stretch      Specialty Daily Treatment Diary       Manual     STM/TRP medial and lateral forearm                IASTM L medial and lateral forearm                Exercise Diary         UBE Retro  5 min rerto 5 min retro 5 mins            3 way Elbow Flexion 4# 20 4# 30 pain NV    Ecc Wrist Flex        Ecc Wrist Ex        Ecc Wrist RD                Hammer Supination and Pronation  Hammer + 2# ankle weight  15x  pain Hammer+2# (ankle weight): 2x10     Hammer UD/RD    Hammer+2# (ankle weight): 2x10            Therabar twists forearm pronation and supination  5 sec x 15  Green   5 sec x 15  Green   5 sec x 15     Therabar smiles   5 sec x 15 green  5 sec x 15 green NV    Therabar A/P, M/L shaking                 TB Scap Retraction  GTB 30 BTB 30 BTB 5 sec x 30    TB B S' Ext  GTB 30 BTB 30 BTB 5 sec x 30            TB ER   GTB 30 BTB 30 BTB 2 sec; 2x15    TB Horiz ABD  GTB 30 BTB 30 BTB 2 sec; 2x15                    Gripper   5 sec x 15  5 sec x 15 70# 50# 5 sec x 20 (eccentric release)    Diggiflex (all, individual)                 Putty (pinch, pinch and twist, pinch and pull)                  Diggiweb finger flexion  5 sec x 10  Red 5 sec x 10 ea Red: 5 sec x 10    Diggiweb finger extension   Red 5 sec x 10  Red: 5 sec x 10            Wrist flexion stretch    20 sec x 3    Wrist Extension stretch    20 sec x 3             High pec stretch    NV            Prone T        Prone I        Prone Y        Prone W                Wall ball circles (cw, ccw)                Scaption        Abduction                Supine thoracic extension over foam roller    2 mins            HEP/EDU        Modalities        MH        CP        Laser        US        Estim    nv

## 2022-06-21 NOTE — TELEPHONE ENCOUNTER
Left detailed message on voicemail, okay per communication consent, about Dr Jane Davey suggestions  Let patient know to call with any questions 
Patient phoned today, follow up call for referral down to [facility], pt states her finally heard back and [facility] stated he needs to pay a 400 dollar co pay and have a psych eval completed  Patient is concerned about out of pocket cost  I let patient know I will f/u with Shankar once he is back in the office  Patient also stated yesterday he started having pain in his injection site, felt ill, and had pain all throughout his head that lasted for 2 hours  He states today he is still feeling nausea  Let patient know if he continues to have these symptoms to go to the ED  Let patient know to fabiola the office with any other questions or concerns 
We can cancel follow ups with me  Will not continue neurotoxin injections   He can discuss next steps in follow up with Dr Arya Marte at follow up
yes...

## 2022-07-07 ENCOUNTER — OFFICE VISIT (OUTPATIENT)
Dept: PHYSICAL THERAPY | Facility: CLINIC | Age: 27
End: 2022-07-07
Payer: OTHER MISCELLANEOUS

## 2022-07-07 DIAGNOSIS — M79.602 LEFT ARM PAIN: Primary | ICD-10-CM

## 2022-07-07 DIAGNOSIS — M25.522 LEFT ELBOW PAIN: ICD-10-CM

## 2022-07-07 PROCEDURE — 97112 NEUROMUSCULAR REEDUCATION: CPT | Performed by: PHYSICAL THERAPIST

## 2022-07-07 PROCEDURE — 97110 THERAPEUTIC EXERCISES: CPT | Performed by: PHYSICAL THERAPIST

## 2022-07-07 PROCEDURE — 97140 MANUAL THERAPY 1/> REGIONS: CPT | Performed by: PHYSICAL THERAPIST

## 2022-07-07 NOTE — PROGRESS NOTES
Daily Note     Today's date: 2022  Patient name: Chema Shay  : 1995  MRN: [id number]  Referring provider: Marcy Hanley MD  Dx:   Encounter Diagnosis     ICD-10-CM    1  Left arm pain  M79 602    2  Left elbow pain  M25 522                   Subjective: Patient reports that he is overall feeling okay today with his elbow  Objective: See treatment diary below      Assessment: Tolerated treatment well; initiated POC with UBE for active warmup  VCs provided on proper sequencing with exercises; added pec stretch  He was able to perform bicep curls today however he requires rest breaks post pronated bicep curls  He was tendency to perform wrist flexion while performing pronated bicep curl  MT performed after receiving consent from pt  Instructed patient on continuing to stretch at home and avoiding exercise that causes sharp pain with bicep curls  Reviewed modalities to use at home  Patient demonstrated fatigue post treatment and would benefit from continued PT      Plan: Continue per plan of care  Advance mid back strengthening next visit with prone exercises        HEP: wrist flexion and extension stretch      Specialty Daily Treatment Diary       Manual    STM/TRP medial and lateral forearm                IASTM L medial and lateral forearm     SMF           Exercise Diary         UBE Retro  5 min rerto 5 min retro 5 mins 5 mins            3 way Elbow Flexion 4# 20 4# 30 pain NV 4# 2x10 ea   Ecc Wrist Flex        Ecc Wrist Ex        Ecc Wrist RD                Hammer Supination and Pronation  Hammer + 2# ankle weight  15x  pain Hammer+2# (ankle weight): 2x10  Hammer+2# (ankle weight): 2x10   Hammer UD/RD    Hammer+2# (ankle weight): 2x10 Hammer+2# (ankle weight): 2x10           Therabar twists forearm pronation and supination  5 sec x 15  Green   5 sec x 15  Green   5 sec x 15     Therabar wrist flexion/ext     Green: 5 sec; 2x10   Therabar smiles   5 sec x 15 green 5 sec x 15 green NV    Therabar A/P, M/L shaking                 TB Scap Retraction  GTB 30 BTB 30 BTB 5 sec x 30 BTB 5 sec x 30   TB B S' Ext  GTB 30 BTB 30 BTB 5 sec x 30 BTB 5 sec x 30           TB ER   GTB 30 BTB 30 BTB 2 sec; 2x15 BTB 2 sec; 3x10   TB Horiz ABD  GTB 30 BTB 30 BTB 2 sec; 2x15 BTB 2 sec; 3x10                   Gripper   5 sec x 15  5 sec x 15 70# 50# 5 sec x 20 (eccentric release) 50# 5 sec x 30 (ecc release)   Diggiflex (all, individual)                 Putty (pinch, pinch and twist, pinch and pull)                  Diggiweb finger flexion  5 sec x 10  Red 5 sec x 10 ea Red: 5 sec x 10 Red: 5 sec x 20   Diggiweb finger extension   Red 5 sec x 10  Red: 5 sec x 10 Red: 5 sec x 20           Wrist flexion stretch    20 sec x 3 20 sec x 3    Wrist Extension stretch    20 sec x 3  20 sec x 3            High pec stretch    NV 20 sec x 3            Prone T     NV   Prone I     NV   Prone Y     NV   Prone W                Wall ball circles (cw, ccw)                Scaption        Abduction                Supine thoracic extension over foam roller    2 mins            HEP/EDU        Modalities        MH        CP        Laser        US        Estim    nv

## 2022-07-12 ENCOUNTER — APPOINTMENT (OUTPATIENT)
Dept: PHYSICAL THERAPY | Facility: CLINIC | Age: 27
End: 2022-07-12
Payer: OTHER MISCELLANEOUS

## 2022-07-14 ENCOUNTER — OFFICE VISIT (OUTPATIENT)
Dept: PHYSICAL THERAPY | Facility: CLINIC | Age: 27
End: 2022-07-14
Payer: OTHER MISCELLANEOUS

## 2022-07-14 DIAGNOSIS — M25.522 LEFT ELBOW PAIN: ICD-10-CM

## 2022-07-14 DIAGNOSIS — M79.602 LEFT ARM PAIN: Primary | ICD-10-CM

## 2022-07-14 PROCEDURE — 97110 THERAPEUTIC EXERCISES: CPT

## 2022-07-14 PROCEDURE — 97112 NEUROMUSCULAR REEDUCATION: CPT

## 2022-07-14 NOTE — PROGRESS NOTES
Daily Note / Discharge    Today's date: 2022  Patient name: Yee Connolly  : 1995  MRN: [id number]  Referring provider: Hank De La Paz MD  Dx:   Encounter Diagnosis     ICD-10-CM    1  Left arm pain  M79 602    2  Left elbow pain  M25 522                   Subjective: Patient states he is doing really well and would like today to be his last day  Objective: See treatment diary below      Assessment: Tolerated treatment well  Patient has met all goals at this time and will be discharged today  Encouraged patient to call if he feels he is having a flareup         Plan: Discharge      HEP: wrist flexion and extension stretch      Specialty Daily Treatment Diary       Manual    STM/TRP medial and lateral forearm                IASTM L medial and lateral forearm NP    SMF           Exercise Diary         UBE Retro nv  5 min rerto 5 min retro 5 mins 5 mins            3 way Elbow Flexion 4# 2x10 ea 4# 30 pain NV 4# 2x10 ea   Ecc Wrist Flex        Ecc Wrist Ex        Ecc Wrist RD                Hammer Supination and Pronation Hammer+2# (ankle weight): 2x10 Hammer + 2# ankle weight  15x  pain Hammer+2# (ankle weight): 2x10  Hammer+2# (ankle weight): 2x10   Hammer UD/RD Hammer+2# (ankle weight): 2x10   Hammer+2# (ankle weight): 2x10 Hammer+2# (ankle weight): 2x10           Therabar twists forearm pronation and supination  5 sec x 15  Green   5 sec x 15  Green   5 sec x 15     Therabar wrist flexion/ext     Green: 5 sec; 2x10   Therabar smiles   5 sec x 15 green  5 sec x 15 green NV    Therabar A/P, M/L shaking                 TB Scap Retraction MTB 5 sec x 30 GTB 30 BTB 30 BTB 5 sec x 30 BTB 5 sec x 30   TB B S' Ext MTB 5 sec x 30 GTB 30 BTB 30 BTB 5 sec x 30 BTB 5 sec x 30           TB ER  MTB 3x10 GTB 30 BTB 30 BTB 2 sec; 2x15 BTB 2 sec; 3x10   TB Horiz ABD MTB 30x GTB 30 BTB 30 BTB 2 sec; 2x15 BTB 2 sec; 3x10                   Gripper 50# 5 sec x 30  5 sec x 15  5 sec x 15 70# 50# 5 sec x 20 (eccentric release) 50# 5 sec x 30 (ecc release)   Diggiflex (all, individual)                 Putty (pinch, pinch and twist, pinch and pull)                  Diggiweb finger flexion Red 5 sec x 20 5 sec x 10  Red 5 sec x 10 ea Red: 5 sec x 10 Red: 5 sec x 20   Diggiweb finger extension Red 5 sec x 20   Red 5 sec x 10  Red: 5 sec x 10 Red: 5 sec x 20           Wrist flexion stretch    20 sec x 3 20 sec x 3    Wrist Extension stretch    20 sec x 3  20 sec x 3            High pec stretch    NV 20 sec x 3            Prone T     NV   Prone I     NV   Prone Y     NV   Prone W                Wall ball circles (cw, ccw)                Scaption        Abduction                Supine thoracic extension over foam roller    2 mins            HEP/EDU        Modalities                CP        Laser        US        Estim    nv

## 2022-07-19 ENCOUNTER — APPOINTMENT (OUTPATIENT)
Dept: PHYSICAL THERAPY | Facility: CLINIC | Age: 27
End: 2022-07-19
Payer: OTHER MISCELLANEOUS

## 2022-07-21 ENCOUNTER — APPOINTMENT (OUTPATIENT)
Dept: PHYSICAL THERAPY | Facility: CLINIC | Age: 27
End: 2022-07-21
Payer: OTHER MISCELLANEOUS

## 2022-08-23 ENCOUNTER — OFFICE VISIT (OUTPATIENT)
Dept: URGENT CARE | Facility: CLINIC | Age: 27
End: 2022-08-23
Payer: COMMERCIAL

## 2022-08-23 VITALS
RESPIRATION RATE: 18 BRPM | SYSTOLIC BLOOD PRESSURE: 126 MMHG | WEIGHT: 148.8 LBS | HEART RATE: 99 BPM | OXYGEN SATURATION: 98 % | TEMPERATURE: 98.4 F | HEIGHT: 66 IN | BODY MASS INDEX: 23.91 KG/M2 | DIASTOLIC BLOOD PRESSURE: 72 MMHG

## 2022-08-23 DIAGNOSIS — Z20.822 CLOSE EXPOSURE TO COVID-19 VIRUS: Primary | ICD-10-CM

## 2022-08-23 LAB
SARS-COV-2 AG UPPER RESP QL IA: NEGATIVE
VALID CONTROL: NORMAL

## 2022-08-23 PROCEDURE — 87811 SARS-COV-2 COVID19 W/OPTIC: CPT | Performed by: PHYSICIAN ASSISTANT

## 2022-08-23 PROCEDURE — 99213 OFFICE O/P EST LOW 20 MIN: CPT | Performed by: PHYSICIAN ASSISTANT

## 2022-08-23 NOTE — LETTER
August 23, 2022     Patient: Luisa Wheatley  YOB: 1995   Date of Visit: 8/23/2022       To Whom It May Concern: It is my medical opinion that Loletta Koyanagi may return to work on 08/24/2022  Please excuse his absence on 8/23/2022  If you have any questions or concerns, please don't hesitate to call           Sincerely,        Lora Bloch, PA-C    CC: No Recipients

## 2022-08-23 NOTE — PROGRESS NOTES
[facility]        NAME: Shona Osullivan  is a 31 y o  male  : 1995    MRN: [id number]  DATE: 2022  TIME: 12:10 PM    Assessment and Plan   Close exposure to COVID-19 virus [Z20 822]  1  Close exposure to COVID-19 virus  Poct Covid 19 Rapid Antigen Test         Patient Instructions     Patient Instructions   Rapid COVID antigen test negative  Follow up with PCP in 3-5 days  Proceed to  ER if symptoms worsen  Chief Complaint     Chief Complaint   Patient presents with    COVID-19 Exposure     Pt exposed to covid via his mom x 4days-pt reports no symptoms at this time-has not home tested         History of Present Illness       Patient is a 69-year-old male presenting today with COVID exposure  Patient notes that he had an exposure to an individual who tested positive for COVID-19 5 days ago, notes he is currently asymptomatic but wanted to make sure he was not positive before returning to work  Denies fever, chills, chest tightness, SOB, coryza  Review of Systems   Review of Systems   Constitutional: Negative for chills and fever  HENT: Negative for ear pain and sore throat  Eyes: Negative for pain and visual disturbance  Respiratory: Negative for cough and shortness of breath  Cardiovascular: Negative for chest pain and palpitations  Gastrointestinal: Negative for abdominal pain and vomiting  Genitourinary: Negative for dysuria and hematuria  Musculoskeletal: Negative for arthralgias and back pain  Skin: Negative for color change and rash  Neurological: Negative for seizures and syncope  All other systems reviewed and are negative          Current Medications       Current Outpatient Medications:     clobetasol (TEMOVATE) 0 05 % cream, Apply topically 2 (two) times a day, Disp: 45 g, Rfl: 0    methylPREDNISolone 4 MG tablet therapy pack, Use as directed on package, Disp: 21 each, Rfl: 0    multivitamin (THERAGRAN) TABS, Take 1 tablet by mouth daily, Disp: , Rfl:     Current Facility-Administered Medications:     AbobotulinumtoxinA SOLR 300 Units, 300 Units, Intramuscular, Q3 Months, Veronica Kessler MD, 300 Units at 07/16/19 0921    AbobotulinumtoxinA SOLR 300 Units, 300 Units, Intramuscular, Q3 Months, Veronica Kessler MD, 300 Units at 10/22/19 1003    AbobotulinumtoxinA SOLR 300 Units, 300 Units, Intramuscular, Q3 Months, Veronica Kessler MD, 300 Units at 01/21/20 1137    Current Allergies     Allergies as of 08/23/2022    (No Known Allergies)            The following portions of the patient's history were reviewed and updated as appropriate: allergies, current medications, past family history, past medical history, past social history, past surgical history and problem list      Past Medical History:   Diagnosis Date    Dizziness     Frequent headaches     Heart burn     Hx of concussion     last assessed 3/28/17    Hypertension     Lyme disease     Palpitations     Postconcussive syndrome 12/14/2017    SOB (shortness of breath)     Swelling     Weakness        Past Surgical History:   Procedure Laterality Date    DENTAL SURGERY      AK COLONOSCOPY FLX DX W/COLLJ SPEC WHEN PFRMD N/A 2/14/2019    Procedure: COLONOSCOPY;  Surgeon: Kartik De León MD;  Location: [facility]; Service: Gastroenterology    AK ESOPHAGOGASTRODUODENOSCOPY TRANSORAL DIAGNOSTIC N/A 2/14/2019    Procedure: ESOPHAGOGASTRODUODENOSCOPY (EGD); Surgeon: Kartik De León MD;  Location: [facility];   Service: Gastroenterology       Family History   Problem Relation Age of Onset    Hypertension Father     Stroke Father     Hyperlipidemia Father     Coronary artery disease Father     Hypertension Maternal Grandfather     Coronary artery disease Maternal Grandfather     Cancer Paternal Grandfather     Hyperlipidemia Paternal Grandfather     Coronary artery disease Paternal Grandfather     Hypertension Paternal Uncle     Stroke Paternal Uncle     Substance Abuse Neg Hx     Mental illness Neg Hx          Medications have been verified  Objective   /72   Pulse 99   Temp 98 4 °F (36 9 °C)   Resp 18   Ht 5' 6" (1 676 m)   Wt 67 5 kg (148 lb 12 8 oz)   SpO2 98%   BMI 24 02 kg/m²        Physical Exam     Physical Exam  Vitals and nursing note reviewed  Constitutional:       General: He is not in acute distress  Appearance: Normal appearance  He is not ill-appearing  HENT:      Head: Normocephalic and atraumatic  Right Ear: Tympanic membrane, ear canal and external ear normal       Left Ear: Tympanic membrane, ear canal and external ear normal       Nose: Nose normal       Mouth/Throat:      Mouth: Mucous membranes are moist       Pharynx: Oropharynx is clear  Cardiovascular:      Rate and Rhythm: Normal rate and regular rhythm  Pulses: Normal pulses  Heart sounds: Normal heart sounds  Pulmonary:      Effort: Pulmonary effort is normal       Breath sounds: Normal breath sounds  Skin:     General: Skin is warm  Capillary Refill: Capillary refill takes less than 2 seconds  Neurological:      Mental Status: He is alert

## 2023-01-01 NOTE — PATIENT INSTRUCTIONS
Try to obtain a list from work of substances you were exposed to in clean up  Contact dermatitis-  Use medrol dose pack as directed     use claritin 10 mg daily in am  If itching persists may use additional benedryl ( diphenhydramine 25 mg at bedtime)  Take temp  Daily  Stay well hydrated   get covid vaccination 2 weeks after the medrol dose pack is completed 0

## 2023-03-25 NOTE — LETTER
January 24, 2019     Johnnie Jesse, [address] [place]  [address] [id number]    Patient: Maurice Valadez  YOB: 1995   Date of Visit: 1/24/2019       Dear Dr Sandra Munoz: Thank you for referring Yumiko Nova to me for evaluation  Below are my notes for this consultation  If you have questions, please do not hesitate to call me  I look forward to following your patient along with you  Sincerely,        Monika Perry MD        CC: No Recipients  Monika Perry MD  1/24/2019  4:07 PM  Sign at close encounter  Consultation - [facility]  Maurice Valadez  1995 male         Chief Complaint:  Epigastric pain, change in bowel habits    HPI:  50-year-old male with no significant past medical history reports having epigastric and lower chest pain for couple of years  Denies any NSAID use  He also reports having some chronic pain issues in the chest, neck and head  He also reports having change in bowel habits with episodes of mucousy loose bowels sometimes  Denies any blood in the stool  Good appetite, no recent weight loss  Denies any heartburn acid reflux  Denies any difficulty swallowing  REVIEW OF SYSTEMS: Review of Systems   Constitutional: Negative for activity change, appetite change, chills, diaphoresis, fatigue, fever and unexpected weight change  HENT: Negative for ear discharge, ear pain, facial swelling, hearing loss, nosebleeds, sore throat, tinnitus and voice change  Eyes: Negative for pain, discharge, redness, itching and visual disturbance  Respiratory: Negative for apnea, cough, chest tightness, shortness of breath and wheezing  Cardiovascular: Negative for chest pain and palpitations  Gastrointestinal:        As noted in HPI   Endocrine: Negative for cold intolerance, heat intolerance and polyuria  Genitourinary: Negative for difficulty urinating, dysuria, flank pain, hematuria and urgency     Musculoskeletal: Negative for arthralgias, back pain, gait problem, joint swelling and myalgias  Skin: Negative for rash and wound  Neurological: Negative for dizziness, tremors, seizures, speech difficulty, light-headedness, numbness and headaches  Hematological: Negative for adenopathy  Does not bruise/bleed easily  Psychiatric/Behavioral: Negative for agitation, behavioral problems and confusion  The patient is not nervous/anxious  Past Medical History:   Diagnosis Date    Dizziness     Frequent headaches     Heart burn     Hx of concussion     last assessed 3/28/17    Hypertension     Palpitations     Postconcussive syndrome 12/14/2017    SOB (shortness of breath)     Swelling     Weakness       Past Surgical History:   Procedure Laterality Date    DENTAL SURGERY       Social History     Social History    Marital status: Single     Spouse name: N/A    Number of children: N/A    Years of education: N/A     Occupational History    Not on file  Social History Main Topics    Smoking status: Former Smoker     Types: Cigars    Smokeless tobacco: Former User      Comment: quit 2 yr    Alcohol use No    Drug use: No    Sexual activity: Not on file     Other Topics Concern    Not on file     Social History Narrative    Dental care regularly    Lives with family - Mom and Dad    Living situation - supportive and safe    No advance directives     Family History   Problem Relation Age of Onset    COPD Father     Hypertension Father     Stroke Father     Hyperlipidemia Father     Hypertension Maternal Grandfather     Cancer Paternal Grandfather     Hyperlipidemia Paternal Grandfather     Hypertension Paternal Rowdy Rayomarlenis     Stroke Paternal Uncle      Patient has no known allergies    Current Outpatient Prescriptions   Medication Sig Dispense Refill    Erenumab-aooe (AIMOVIG) 70 MG/ML SOAJ One injection monthly SC in thigh or stomach 2 pen 0    methocarbamol (ROBAXIN) 500 mg tablet 1 tab qhs and up to TID if tolerated PRN headache 30 tablet 0    multivitamin (THERAGRAN) TABS Take 1 tablet by mouth daily      Na Sulfate-K Sulfate-Mg Sulf (SUPREP BOWEL PREP KIT) 17 5-3 13-1 6 GM/177ML SOLN Take 2 Bottles by mouth see administration instructions Please follow the instructions from the office 2 Bottle 0    omeprazole (PriLOSEC) 40 MG capsule Take 1 capsule (40 mg total) by mouth daily 30 capsule 2     No current facility-administered medications for this visit  Blood pressure 122/68, pulse 68, temperature 97 8 °F (36 6 °C), temperature source Tympanic, resp  rate 16, height 5' 6" (1 676 m), weight 64 1 kg (141 lb 6 4 oz)  PHYSICAL EXAM: Physical Exam   Constitutional: He is oriented to person, place, and time  He appears well-developed  HENT:   Head: Normocephalic and atraumatic  Mouth/Throat: Oropharynx is clear and moist    Eyes: Pupils are equal, round, and reactive to light  Conjunctivae are normal  Right eye exhibits no discharge  Left eye exhibits no discharge  No scleral icterus  Neck: Neck supple  No JVD present  No tracheal deviation present  No thyromegaly present  Cardiovascular: Normal rate, regular rhythm, normal heart sounds and intact distal pulses  Exam reveals no gallop and no friction rub  No murmur heard  Pulmonary/Chest: Effort normal and breath sounds normal  No respiratory distress  He has no wheezes  He has no rales  He exhibits no tenderness  Abdominal: Soft  Bowel sounds are normal  He exhibits no distension and no mass  There is no tenderness  There is no rebound and no guarding  No hernia  Musculoskeletal: He exhibits no edema  Lymphadenopathy:     He has no cervical adenopathy  Neurological: He is alert and oriented to person, place, and time  Skin: Skin is warm and dry  No rash noted  No erythema  Psychiatric: He has a normal mood and affect   His behavior is normal  Thought content normal         Lab Results   Component Value Date    WBC 4 0 09/13/2018    HGB 15 9 09/13/2018    HCT 47 6 09/13/2018    MCV 90 09/13/2018     09/13/2018     Lab Results   Component Value Date    CALCIUM 9 8 07/31/2017     07/31/2017    K 4 6 09/13/2018    CO2 25 09/13/2018     09/13/2018    BUN 14 09/13/2018    CREATININE 1 24 07/31/2017     Lab Results   Component Value Date    AST 18 07/31/2017    ALKPHOS 63 07/31/2017    BILITOT 0 6 07/31/2017     Lab Results   Component Value Date    INR 0 96 01/28/2017    PROTIME 12 9 01/28/2017       Xr Chest Pa & Lateral    Result Date: 11/29/2018  Impression: No acute cardiopulmonary disease  Workstation performed: [id number]       ASSESSMENT & PLAN:    Epigastric pain  Possible from peptic ulcer disease or gastric erosions  Rule out H pylori gastritis     -Patient was explained about the lifestyle and dietary modifications  Advised to avoid fatty foods, chocolates, caffeine, alcohol and any other triggering foods  Avoid eating for at least 3 hours before going to bed         -give a trial with Prilosec    -schedule for EGD    Change in bowel habits  Symptoms appear to be most likely nonspecific functional from irritable bowel syndrome  Also possible from lactose or diet intolerance     -advised patient to maintain a diary of the foods when he has the symptoms    -check celiac disease panel    -Schedule for colonoscopy  -High-fiber diet     -Patient was given instructions about the colonoscopy prep     -Patient was explained about  the risks and benefits of the procedure  Risks including but not limited to bleeding, infection, perforation were explained in detail  Also explained about less than 100% sensitivity with the exam and other alternatives  Name band;

## 2024-01-16 ENCOUNTER — TELEMEDICINE (OUTPATIENT)
Dept: FAMILY MEDICINE CLINIC | Facility: HOSPITAL | Age: 29
End: 2024-01-16
Payer: COMMERCIAL

## 2024-01-16 VITALS — HEIGHT: 66 IN | WEIGHT: 152 LBS | BODY MASS INDEX: 24.43 KG/M2

## 2024-01-16 DIAGNOSIS — R76.8 POSITIVE ANA (ANTINUCLEAR ANTIBODY): ICD-10-CM

## 2024-01-16 DIAGNOSIS — Z13.6 SCREENING FOR CARDIOVASCULAR CONDITION: ICD-10-CM

## 2024-01-16 DIAGNOSIS — K90.0 CELIAC DISEASE: ICD-10-CM

## 2024-01-16 DIAGNOSIS — U07.1 COVID-19: Primary | ICD-10-CM

## 2024-01-16 DIAGNOSIS — G24.3 CERVICAL DYSTONIA: ICD-10-CM

## 2024-01-16 PROCEDURE — 99213 OFFICE O/P EST LOW 20 MIN: CPT | Performed by: INTERNAL MEDICINE

## 2024-01-16 RX ORDER — BENZONATATE 100 MG/1
100 CAPSULE ORAL 3 TIMES DAILY PRN
Qty: 40 CAPSULE | Refills: 0 | Status: SHIPPED | OUTPATIENT
Start: 2024-01-16

## 2024-01-16 RX ORDER — NIRMATRELVIR AND RITONAVIR 300-100 MG
3 KIT ORAL 2 TIMES DAILY
Qty: 30 TABLET | Refills: 0 | Status: SHIPPED | OUTPATIENT
Start: 2024-01-16 | End: 2024-01-21

## 2024-01-16 NOTE — PROGRESS NOTES
COVID-19 Outpatient Progress Note    Assessment/Plan:    Problem List Items Addressed This Visit    None  Visit Diagnoses       COVID-19    -  Primary           Disposition:     I have spent a total time of 17 minutes on the day of the encounter for this patient including risks and benefits of treatment options, instructions for management, importance of treatment compliance and documenting in the medical record.       Encounter provider: Diane Boston DO     Provider located at: [facility] [address]  [address] [place] [id number]     Recent Visits  No visits were found meeting these conditions.  Showing recent visits within past 7 days and meeting all other requirements  Today's Visits  Date Type Provider Dept   01/16/24 Telemedicine Diane Boston DO [facility] [address]   Showing today's visits and meeting all other requirements  Future Appointments  No visits were found meeting these conditions.  Showing future appointments within next 150 days and meeting all other requirements     This virtual check-in was done via [employer] and patient was informed that this is a secure, HIPAA-compliant platform. He agrees to proceed.    Patient agrees to participate in a virtual check in via telephone or video visit instead of presenting to the office to address urgent/immediate medical needs. Patient is aware this is a billable service. He acknowledged consent and understanding of privacy and security of the video platform. The patient has agreed to participate and understands they can discontinue the visit at any time.    After connecting through [employer], the patient was identified by name and date of birth. Alejandro Foster Jr. was informed that this was a telemedicine visit and that the exam was being conducted confidentially over secure lines. My office door was closed. No one else was in the room. Alejandro Foster Jr. acknowledged consent  "and understanding of privacy and security of the telemedicine visit. I informed the patient that I have reviewed his record in Epic and presented the opportunity for him to ask any questions regarding the visit today. The patient agreed to participate.     Verification of patient location:  Patient is located in the following state in which I hold an active license: [place]    Subjective:   Alejandro Foster Jr. is a 28 y.o. male who is concerned about COVID-19. Patient's symptoms include nasal congestion, cough, nausea and vomiting.     - Date of symptom onset: 1/14/2024      COVID-19 vaccination status: Fully vaccinated (primary series)    Coworkers have been out sick with covid   Shant has never had it but had primary immunizations   Started with vomitnhg and then cough and congestion in past 48 hours- went to work yesterday but felt worse today and tested psoitive   Will give paxlovid as he has hx of celiac disease    Not seen in office sicne 2021- need s appt and has new insurance with  need for annual physical   Will order labs       Lab Results   Component Value Date    SARSCOVAG Negative 08/23/2022       Review of Systems   HENT:  Positive for congestion.    Respiratory:  Positive for cough.    Gastrointestinal:  Positive for nausea and vomiting.     Current Outpatient Medications on File Prior to Visit   Medication Sig    multivitamin (THERAGRAN) TABS Take 1 tablet by mouth daily    clobetasol (TEMOVATE) 0.05 % cream Apply topically 2 (two) times a day    methylPREDNISolone 4 MG tablet therapy pack Use as directed on package       Objective:    Ht 5' 6\" (1.676 m)   Wt 68.9 kg (152 lb)   BMI 24.53 kg/m²        Physical Exam  Vitals and nursing note reviewed.   Constitutional:       Appearance: He is ill-appearing.   HENT:      Nose: Congestion present.   Eyes:      General: No scleral icterus.        Right eye: No discharge.         Left eye: No discharge.      Conjunctiva/sclera: Conjunctivae normal.   Pulmonary: "      Effort: No respiratory distress.      Comments: Occasional cough  Neurological:      Mental Status: He is alert.       Diane Fly, DO

## 2024-01-16 NOTE — LETTER
January 16, 2024     Patient: Alejandro Foster Jr.  YOB: 1995  Date of Visit: 1/16/2024      To Whom it May Concern:    Alejandro Foster is under my professional care. Alejanrdo was seen ivirtually for visit  on 1/16/2024. Alejandro may return to work on 1/19/24 . Recommend mask for 5 days     If you have any questions or concerns, please don't hesitate to call.         Sincerely,          Diane Boston,         CC: No Recipients

## 2024-06-10 ENCOUNTER — TELEPHONE (OUTPATIENT)
Age: 29
End: 2024-06-10

## 2024-06-10 NOTE — TELEPHONE ENCOUNTER
Patient called he's having issues with his neck , bulging desk. Every time he turns left or right he feels cracking or popping, and little pain. Patienty was wondering if Dr Bolivar could see him virtually. No available appointment before 07/10/24 after 4:00 pm and patient has difficulty leaving work early. Please advise.

## 2024-06-12 NOTE — TELEPHONE ENCOUNTER
Left message for PT to call back - Dr. Boston will see him at the end of the day today - which would be around 4:30.

## 2024-06-18 ENCOUNTER — OFFICE VISIT (OUTPATIENT)
Dept: FAMILY MEDICINE CLINIC | Facility: HOSPITAL | Age: 29
End: 2024-06-18
Payer: COMMERCIAL

## 2024-06-18 VITALS
OXYGEN SATURATION: 98 % | RESPIRATION RATE: 16 BRPM | WEIGHT: 154 LBS | DIASTOLIC BLOOD PRESSURE: 68 MMHG | SYSTOLIC BLOOD PRESSURE: 118 MMHG | HEART RATE: 86 BPM | BODY MASS INDEX: 24.75 KG/M2 | TEMPERATURE: 97.6 F | HEIGHT: 66 IN

## 2024-06-18 DIAGNOSIS — M47.22 OSTEOARTHRITIS OF SPINE WITH RADICULOPATHY, CERVICAL REGION: Primary | ICD-10-CM

## 2024-06-18 PROBLEM — R00.2 PALPITATION: Chronic | Status: RESOLVED | Noted: 2018-02-22 | Resolved: 2024-06-18

## 2024-06-18 PROBLEM — G24.3 CERVICAL DYSTONIA: Status: RESOLVED | Noted: 2019-06-20 | Resolved: 2024-06-18

## 2024-06-18 PROBLEM — F07.81 POSTCONCUSSIVE SYNDROME: Status: RESOLVED | Noted: 2017-12-14 | Resolved: 2024-06-18

## 2024-06-18 PROBLEM — R06.02 SOB (SHORTNESS OF BREATH): Status: RESOLVED | Noted: 2018-09-25 | Resolved: 2024-06-18

## 2024-06-18 PROBLEM — A69.20 LYME DISEASE: Chronic | Status: RESOLVED | Noted: 2017-08-01 | Resolved: 2024-06-18

## 2024-06-18 PROCEDURE — 99213 OFFICE O/P EST LOW 20 MIN: CPT | Performed by: INTERNAL MEDICINE

## 2024-06-18 RX ORDER — NAPROXEN 500 MG/1
500 TABLET ORAL 2 TIMES DAILY WITH MEALS
Qty: 14 TABLET | Refills: 0 | Status: SHIPPED | OUTPATIENT
Start: 2024-06-18 | End: 2024-06-25

## 2024-06-18 NOTE — PROGRESS NOTES
Assessment/Plan:    Osteoarthritis of spine with radiculopathy, cervical region  Patient presents with a chief complaint of feeling a grinding sensation in the cervical spine when he turns his head from the left to the right.  Is been going on for a few weeks now.  Sometimes he also has pain on the right side in the mid cervical spine area that radiates sometimes to the occipital area but not towards the right shoulder or arm.  He denies any upper extremity weakness, numbness, paresthesias.  Denies fevers, chills, unintentional weight loss    He had restricted rotation of the cervical spine.  Flexion and extension of the cervical spine was normal.  He had no spinous process tenderness.  He had no cervical paraspinal muscle tenderness.   Upper extremity muscle bulk, tone, muscle strength and DTRs were normal.  Sensation of the upper extremity was normal to light touch    I reviewed MRI of the cervical spine from 2018 that showed diffuse disc bulge at the level of C4-C5.    Suspect patient's complaints are due to osteoarthritis, disc bulge.  Neurologically he is intact, repeat imaging testing is not indicated.    Patient denies any history of GERD, renal function was normal in 2020    I prescribed naproxen for 7 days and gave patient exercises for improved range of motion of the cervical spine.    I asked him to call if he is not feeling better in which case I will refer patient to spinal physical therapy  I prescribed naproxen     Diagnoses and all orders for this visit:    Osteoarthritis of spine with radiculopathy, cervical region  -     naproxen (Naprosyn) 500 mg tablet; Take 1 tablet (500 mg total) by mouth 2 (two) times a day with meals for 7 days          Subjective:      Patient ID: Alejandro Foster Jr. is a 28 y.o. male.      HPI    Patient presents for follow-up of chronic conditions as detailed in the assessment and plan.      The following portions of the patient's history were reviewed and updated as  "appropriate: current medications, past family history, past medical history, past social history, past surgical history and problem list.    Review of Systems      Objective:    /68   Pulse 86   Temp 97.6 °F (36.4 °C) (Tympanic)   Resp 16   Ht 5' 6\" (1.676 m)   Wt 69.9 kg (154 lb)   SpO2 98%   BMI 24.86 kg/m²      Physical Exam  Constitutional:       General: He is not in acute distress.     Appearance: He is not toxic-appearing.   Musculoskeletal:         General: No tenderness.   Skin:     Findings: No bruising, erythema or rash.   Neurological:      Mental Status: He is alert.      Sensory: No sensory deficit.      Motor: No weakness.      Deep Tendon Reflexes: Reflexes normal.             Humberto Toure MD  "

## 2024-06-18 NOTE — PATIENT INSTRUCTIONS
Neck Exercises   WHAT YOU NEED TO KNOW:   What do I need to know about neck exercises?  Neck exercises help reduce neck pain, and improve neck movement and strength. Neck exercises also help prevent long-term neck problems.  What do I need to know about neck exercise safety?   Move slowly, gently, and smoothly.  Avoid fast or jerky motions.    Stand and sit the way your healthcare provider shows you.  Good posture may reduce your neck pain. Check your posture often, even when you are not doing your neck exercises.    Follow the exercise program recommended by your healthcare provider.  He or she will tell you which exercises are best for your condition. He or she will also tell you how many repetitions to do and how often you should do the exercises.    How do I perform neck exercises safely?   Exercise position:  You may sit or stand while you do neck exercises. Face forward. Your shoulders should be straight and relaxed, with a good posture.         Head tilts, forward and back:  Gently bow your head and try to touch your chin to your chest. Your healthcare provider may tell you to push on the back of your neck to help bow your head. Raise your chin back to the starting position. Tilt your head back as far as possible so you are looking up at the ceiling. Your healthcare provider may tell you to lift your chin to help tilt your head back. Return your head to the starting position.         Head tilts, side to side:  Tilt your head, bringing your ear toward your shoulder. Then tilt your head toward the other shoulder.         Head turns:  Turn your head to look over your shoulder. Tilt your chin down and try to touch it to your shoulder. Do not raise your shoulder to your chin. Face forward again. Do the same on the other side.         Head rolls:  Slowly bring your chin toward your chest. Next, roll your head to the right. Your ear should be positioned over your shoulder. Hold this position for 5 seconds. Roll your  head back toward your chest and to the left into the same position. Hold for 5 seconds. Gently roll your head back and around in a clockwise Creek 3 times. Next, move your head in the reverse direction (counterclockwise) in a Creek 3 times. Do not shrug your shoulders upwards while you do this exercise.       When should I call my doctor?   Your pain does not get better, or gets worse.    You have questions or concerns about your condition, care, or exercise program.    CARE AGREEMENT:   You have the right to help plan your care. Learn about your health condition and how it may be treated. Discuss treatment options with your healthcare providers to decide what care you want to receive. You always have the right to refuse treatment. The above information is an  only. It is not intended as medical advice for individual conditions or treatments. Talk to your doctor, nurse or pharmacist before following any medical regimen to see if it is safe and effective for you.  © Copyright [employer] 2023 Information is for End User's use only and may not be sold, redistributed or otherwise used for commercial purposes.

## 2024-06-18 NOTE — ASSESSMENT & PLAN NOTE
Patient presents with a chief complaint of feeling a grinding sensation in the cervical spine when he turns his head from the left to the right.  Is been going on for a few weeks now.  Sometimes he also has pain on the right side in the mid cervical spine area that radiates sometimes to the occipital area but not towards the right shoulder or arm.  He denies any upper extremity weakness, numbness, paresthesias.  Denies fevers, chills, unintentional weight loss    He had restricted rotation of the cervical spine.  Flexion and extension of the cervical spine was normal.  He had no spinous process tenderness.  He had no cervical paraspinal muscle tenderness.   Upper extremity muscle bulk, tone, muscle strength and DTRs were normal.  Sensation of the upper extremity was normal to light touch    I reviewed MRI of the cervical spine from 2018 that showed diffuse disc bulge at the level of C4-C5.    Suspect patient's complaints are due to osteoarthritis, disc bulge.  Neurologically he is intact, repeat imaging testing is not indicated.    Patient denies any history of GERD, renal function was normal in 2020    I prescribed naproxen for 7 days and gave patient exercises for improved range of motion of the cervical spine.    I asked him to call if he is not feeling better in which case I will refer patient to spinal physical therapy  I prescribed naproxen

## 2024-09-30 ENCOUNTER — OFFICE VISIT (OUTPATIENT)
Dept: OBGYN CLINIC | Facility: CLINIC | Age: 29
End: 2024-09-30
Payer: OTHER MISCELLANEOUS

## 2024-09-30 VITALS
SYSTOLIC BLOOD PRESSURE: 111 MMHG | HEIGHT: 66 IN | WEIGHT: 164 LBS | DIASTOLIC BLOOD PRESSURE: 64 MMHG | BODY MASS INDEX: 26.36 KG/M2

## 2024-09-30 DIAGNOSIS — M77.12 LATERAL EPICONDYLITIS OF LEFT ELBOW: Primary | ICD-10-CM

## 2024-09-30 PROCEDURE — 99203 OFFICE O/P NEW LOW 30 MIN: CPT | Performed by: ORTHOPAEDIC SURGERY

## 2024-09-30 RX ORDER — METHYLPREDNISOLONE 4 MG
TABLET, DOSE PACK ORAL
Qty: 1 EACH | Refills: 0 | Status: SHIPPED | OUTPATIENT
Start: 2024-09-30

## 2024-09-30 NOTE — LETTER
September 30, 2024     Patient: Alejandro Foster Jr.  YOB: 1995  Date of Visit: 9/30/2024      To Whom it May Concern:    Alejandro Foster is under my professional care. Alejandro was seen in my office on 9/30/2024. Alejandro may return to work with limitations of taking a 15 minute break every 2 hours to do exercises and stretches provided by Hand Therapist. No repetitive gripping or grasping for more than 2 hours at a time until next appointment .    If you have any questions or concerns, please don't hesitate to call.         Sincerely,          Desirae Cifuentes MD        CC: No Recipients

## 2024-09-30 NOTE — PROGRESS NOTES
Assessment/Plan:  1. Lateral epicondylitis of left elbow  Ambulatory Referral to PT/OT Hand Therapy          Subjective history, physical examination performed, diagnostic imaging reviewed at today's visit  Diagnosis was discussed  Treatment options were discussed which included nonoperative treatment in the form of therapy and activity modification.  Consider Medrol dosepak to help with inflammation.  I explained to the patient that repetitive activity will only continue to exacerbate the pain in his elbow.  I can provide him with a work note indicating that he will be off for a few weeks for rest and therapy; however, when he returns to work he and begins doing repetitive activity again, the symptoms will recur.  I recommended work restrictions and therapy.  The patient was given an opportunity to ask questions.  Questions were answered to the patient's satisfaction.    Through shared decision making, the patient decided to move forward with PT/OT hand therapy to work on stretching, strengthening and modalities as needed.   Work note given with restrictions of a 15 minute break every 2 hours and no repetitive gripping or grasping greater than 2 hours.  Follow up in 4 weeks.          cc: left elbow pain    Subjective:   Alejandro Foster Jr. is a left hand dominant 28 y.o. male who presents today for evaluation for left elbow pain. He reports having pain in the elbow for a few years. Pain is located over the lateral aspect of his elbow. In a new job, he started having pain over the lateral aspect of his elbow due to his work. He has been on restricted work duties in the past. Pain resolved, then recurred after returning to unrestricted duty. He is currently not on restricted duty. He reports swelling in the LUE at the elbow during the work day.  Coworkers have noticed this too.    In 2018 he had a work injury related to his head and neck.He notes having some pain in the left upper extremity following this injury. This  current pain is different.    Review of Systems   Constitutional:  Negative for chills and fever.   HENT:  Negative for ear pain and sore throat.    Eyes:  Negative for pain and visual disturbance.   Respiratory:  Negative for cough and shortness of breath.    Cardiovascular:  Negative for chest pain and palpitations.   Gastrointestinal:  Negative for abdominal pain and vomiting.   Genitourinary:  Negative for dysuria and hematuria.   Musculoskeletal:  Negative for arthralgias and back pain.   Skin:  Negative for color change and rash.   Neurological:  Negative for seizures and syncope.   All other systems reviewed and are negative.        Past Medical History:   Diagnosis Date    Dizziness     Frequent headaches     Heart burn     Hx of concussion     last assessed 3/28/17    Hypertension     Lyme disease     Palpitations     Postconcussive syndrome 12/14/2017    SOB (shortness of breath)     Swelling     Weakness        Past Surgical History:   Procedure Laterality Date    DENTAL SURGERY      [place] COLONOSCOPY FLX DX W/COLLJ SPEC WHEN PFRMD N/A 2/14/2019    Procedure: COLONOSCOPY;  Surgeon: Chpao Robbins MD;  Location: [facility];  Service: Gastroenterology    AK ESOPHAGOGASTRODUODENOSCOPY TRANSORAL DIAGNOSTIC N/A 2/14/2019    Procedure: ESOPHAGOGASTRODUODENOSCOPY (EGD);  Surgeon: Chapo Robbins MD;  Location: [facility];  Service: Gastroenterology       Family History   Problem Relation Age of Onset    Hypertension Father     Stroke Father     Hyperlipidemia Father     Coronary artery disease Father     Hypertension Maternal Grandfather     Coronary artery disease Maternal Grandfather     Cancer Paternal Grandfather     Hyperlipidemia Paternal Grandfather     Coronary artery disease Paternal Grandfather     Hypertension Paternal Uncle     Stroke Paternal Uncle     Substance Abuse Neg Hx     Mental illness Neg Hx        Social History     Occupational History    Not on file   Tobacco Use    Smoking status:  Former     Current packs/day: 0.00     Average packs/day: 1 pack/day for 10.0 years (10.0 ttl pk-yrs)     Types: Cigars, Cigarettes     Start date:      Quit date: 2017     Years since quittin.7    Smokeless tobacco: Never    Tobacco comments:     Quit 2017   Vaping Use    Vaping status: Never Used   Substance and Sexual Activity    Alcohol use: Yes     Comment: social    Drug use: No    Sexual activity: Not Currently         Current Outpatient Medications:     multivitamin (THERAGRAN) TABS, Take 1 tablet by mouth daily, Disp: , Rfl:     naproxen (Naprosyn) 500 mg tablet, Take 1 tablet (500 mg total) by mouth 2 (two) times a day with meals for 7 days, Disp: 14 tablet, Rfl: 0    No Known Allergies    Objective:  Vitals:    24 1341   BP: 111/64       The patient was awake, alert, and oriented to person, place, and time.  No acute distress.  Normocephalic.  EOMI.  Mucous membranes moist.  Normal respiratory effort.    Examination of the affected extremity was compared to the unaffected extremity.  Skin was intact.  No swelling or ecchymosis.  No deformity.  Hand and fingers were warm and well-perfused.  Capillary refill was brisk.  Full active range of motion of the elbows, forearms, wrists, and fingers.  5/5 elbow flexors/extensors, wrist flexor/extensors, and .     No pain or break in strength with wrist extension  TTP common extensor bundle  TTP along radial tunnel    Imaging/Diagnostic Studies:    I reviewed imaging studies dated 2024 which included left elbow.  These images studies demonstrated normal findings.        This document was created using speech voice recognition software.   Grammatical errors, random word insertions, pronoun errors, and incomplete sentences are an occasional consequence of this system due to software limitations, ambient noise, and hardware issues.   Any formal questions or concerns about content, text, or information contained within the body of this dictation  should be directly addressed to the provider for clarification.     Scribe Attestation      I,:  Bao Ross am acting as a scribe while in the presence of the attending physician.:       I,:  Desirae Cifuentes MD personally performed the services described in this documentation    as scribed in my presence.:

## 2024-10-18 ENCOUNTER — TELEPHONE (OUTPATIENT)
Age: 29
End: 2024-10-18

## 2024-10-18 ENCOUNTER — OFFICE VISIT (OUTPATIENT)
Dept: OBGYN CLINIC | Facility: CLINIC | Age: 29
End: 2024-10-18
Payer: OTHER MISCELLANEOUS

## 2024-10-18 VITALS
HEIGHT: 66 IN | SYSTOLIC BLOOD PRESSURE: 122 MMHG | BODY MASS INDEX: 26.52 KG/M2 | WEIGHT: 165 LBS | DIASTOLIC BLOOD PRESSURE: 66 MMHG

## 2024-10-18 DIAGNOSIS — M77.12 LATERAL EPICONDYLITIS OF LEFT ELBOW: Primary | ICD-10-CM

## 2024-10-18 PROCEDURE — 99213 OFFICE O/P EST LOW 20 MIN: CPT | Performed by: ORTHOPAEDIC SURGERY

## 2024-10-18 NOTE — PROGRESS NOTES
Assessment/Plan:  1. Lateral epicondylitis of left elbow              Subjective history, physical examination performed, diagnostic imaging reviewed at today's visit  Diagnosis was discussed, including MRI report which was not available at the last office visit. No abnormalities on MRI.  Treatment options were discussed which included nonoperative treatment in the form of therapy and activity modification.    I explained to the patient that repetitive activity will only continue to exacerbate the pain in his elbow.  I can provide him with a work note indicating that he will be off for a few weeks for rest and therapy; however, when he returns to work he and begins doing repetitive activity again, the symptoms will recur.  I recommended continued therapy and out of work x 4 weeks.  The patient was given an opportunity to ask questions.  Questions were answered to the patient's satisfaction.    Through shared decision making, the patient decided to move forward with PT/OT hand therapy to work on stretching, strengthening and modalities as needed.   Work note given to keep out of work x 4 weeks, as patient continues to do repetitive work (even with restrictions) which worsens symptoms  Follow up in 4 weeks for clinical evaluation        cc: left elbow pain    Subjective:   Alejandro Foster Jr. is a left hand dominant 28 y.o. male who presents today for follow up evaluation for left elbow pain. He reports having pain in the elbow for a few years. Pain is located over the lateral aspect of his elbow. In a new job, he started having pain over the lateral aspect of his elbow due to his work. He has been on restricted work duties in the past. Pain resolved, then recurred after returning to unrestricted duty. He is currently on restricted duty based on the work note I gave the patient at the last office visit. Today he reported his current work assignment is less strenuous, but still repetitive.    In 2018 he had a work  injury related to his head and neck.He notes having some pain in the left upper extremity following this injury. This current pain is different.    Review of Systems   Constitutional:  Negative for chills and fever.   HENT:  Negative for ear pain and sore throat.    Eyes:  Negative for pain and visual disturbance.   Respiratory:  Negative for cough and shortness of breath.    Cardiovascular:  Negative for chest pain and palpitations.   Gastrointestinal:  Negative for abdominal pain and vomiting.   Genitourinary:  Negative for dysuria and hematuria.   Musculoskeletal:  Negative for arthralgias and back pain.   Skin:  Negative for color change and rash.   Neurological:  Negative for seizures and syncope.   All other systems reviewed and are negative.        Past Medical History:   Diagnosis Date    Dizziness     Frequent headaches     Heart burn     Hx of concussion     last assessed 3/28/17    Hypertension     Lyme disease     Palpitations     Postconcussive syndrome 12/14/2017    SOB (shortness of breath)     Swelling     Weakness        Past Surgical History:   Procedure Laterality Date    DENTAL SURGERY      [place] COLONOSCOPY FLX DX W/COLLJ SPEC WHEN PFRMD N/A 2/14/2019    Procedure: COLONOSCOPY;  Surgeon: Chapo Robbins MD;  Location: [facility];  Service: Gastroenterology    [place] ESOPHAGOGASTRODUODENOSCOPY TRANSORAL DIAGNOSTIC N/A 2/14/2019    Procedure: ESOPHAGOGASTRODUODENOSCOPY (EGD);  Surgeon: Chapo Robbins MD;  Location: [facility];  Service: Gastroenterology       Family History   Problem Relation Age of Onset    Hypertension Father     Stroke Father     Hyperlipidemia Father     Coronary artery disease Father     Hypertension Maternal Grandfather     Coronary artery disease Maternal Grandfather     Cancer Paternal Grandfather     Hyperlipidemia Paternal Grandfather     Coronary artery disease Paternal Grandfather     Hypertension Paternal Uncle     Stroke Paternal Uncle     Substance Abuse Neg Hx      Mental illness Neg Hx        Social History     Occupational History    Not on file   Tobacco Use    Smoking status: Former     Current packs/day: 0.00     Average packs/day: 1 pack/day for 10.0 years (10.0 ttl pk-yrs)     Types: Cigars, Cigarettes     Start date:      Quit date: 2017     Years since quittin.8    Smokeless tobacco: Never    Tobacco comments:     Quit 2017   Vaping Use    Vaping status: Never Used   Substance and Sexual Activity    Alcohol use: Yes     Comment: social    Drug use: No    Sexual activity: Not Currently         Current Outpatient Medications:     multivitamin (THERAGRAN) TABS, Take 1 tablet by mouth daily, Disp: , Rfl:     methylPREDNISolone 4 MG tablet therapy pack, Use as directed on package (Patient not taking: Reported on 10/18/2024), Disp: 1 each, Rfl: 0    naproxen (Naprosyn) 500 mg tablet, Take 1 tablet (500 mg total) by mouth 2 (two) times a day with meals for 7 days, Disp: 14 tablet, Rfl: 0    No Known Allergies    Objective:  Vitals:    10/18/24 0936   BP: 122/66       The patient was awake, alert, and oriented to person, place, and time.  No acute distress.  Normocephalic.  EOMI.  Mucous membranes moist.  Normal respiratory effort.    Examination of the affected extremity was compared to the unaffected extremity.  Skin was intact.  No swelling or ecchymosis.  No deformity.  Hand and fingers were warm and well-perfused.  Capillary refill was brisk.  Full active range of motion of the elbows, forearms, wrists, and fingers.  5/5 elbow flexors/extensors, wrist flexor/extensors, and .     No pain or break in strength with wrist extension  TTP common extensor tendon origin  TTP along radial tunnel    Imaging/Diagnostic Studies:    No new images studies.        This document was created using speech voice recognition software.   Grammatical errors, random word insertions, pronoun errors, and incomplete sentences are an occasional consequence of this system due to  software limitations, ambient noise, and hardware issues.   Any formal questions or concerns about content, text, or information contained within the body of this dictation should be directly addressed to the provider for clarification.     Scribe Attestation      I,:   am acting as a scribe while in the presence of the attending physician.:       I,:   personally performed the services described in this documentation    as scribed in my presence.:

## 2024-10-18 NOTE — TELEPHONE ENCOUNTER
Caller: Fiorella-work comp nurse    Doctor/Office: [facility]    CB#: [phone]      What needs to be faxed: office note and work note from today    ATTN to: Fiorella    Fax#: [phone]      Documents were successfully e-faxed

## 2024-11-15 VITALS
WEIGHT: 170 LBS | BODY MASS INDEX: 27.32 KG/M2 | DIASTOLIC BLOOD PRESSURE: 62 MMHG | SYSTOLIC BLOOD PRESSURE: 102 MMHG | HEIGHT: 66 IN

## 2024-11-15 DIAGNOSIS — M77.12 LATERAL EPICONDYLITIS OF LEFT ELBOW: Primary | ICD-10-CM

## 2024-11-15 PROCEDURE — 99213 OFFICE O/P EST LOW 20 MIN: CPT | Performed by: ORTHOPAEDIC SURGERY

## 2024-11-15 NOTE — LETTER
November 15, 2024     Patient: Alejandro Foster Jr.  YOB: 1995  Date of Visit: 11/15/2024      To Whom it May Concern:    Alejandro Foster is under my professional care. Alejandro was seen in my office on 11/15/2024. Alejandro may return to work on 11/15/2024 without restrictions .    If you have any questions or concerns, please don't hesitate to call.         Sincerely,          Desirae Cifuentes MD        CC: No Recipients

## 2024-11-15 NOTE — PROGRESS NOTES
Assessment/Plan:  1. Lateral epicondylitis of left elbow                Subjective history, physical examination performed, diagnostic imaging reviewed at today's visit  Diagnosis was discussed,  Treatment options were discussed which included nonoperative treatment in the form of therapy and activity modification.    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  Continue home exercises.  The patient was given an opportunity to ask questions.  Questions were answered to the patient's satisfaction.    Follow up prn if symptoms worsen or fail to improve.        cc: left elbow pain    Subjective:   Alejandro Foster Jr. is a left hand dominant 29 y.o. male who presents today for follow up evaluation for left elbow pain. He reports having pain in the elbow for a few years. Pain is located over the lateral aspect of his elbow. In a new job, he started having pain over the lateral aspect of his elbow due to his work. He has been on restricted work duties in the past. Pain resolved, then recurred after returning to unrestricted duty. Patient was held out of work at his last visit and presents today for updated work restrictions. Patient is taking Aleve for pain as needed. He notes his elbow is achy in the mornings but overall has improved. He reports he discontinued PT. 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    In 2018 he had a work injury related to his head and neck. He notes having some pain in the left upper extremity following this injury. This current pain is different.    Review of Systems   Constitutional:  Negative for chills and fever.   HENT:  Negative for ear pain and sore throat.    Eyes:  Negative for pain and visual disturbance.   Respiratory:  Negative for cough and shortness of breath.    Cardiovascular:  Negative for chest pain and palpitations.   Gastrointestinal:  Negative for  abdominal pain and vomiting.   Genitourinary:  Negative for dysuria and hematuria.   Musculoskeletal:  Negative for arthralgias and back pain.   Skin:  Negative for color change and rash.   Neurological:  Negative for seizures and syncope.   All other systems reviewed and are negative.        Past Medical History:   Diagnosis Date    Dizziness     Frequent headaches     Heart burn     Hx of concussion     last assessed 3/28/17    Hypertension     Lyme disease     Palpitations     Postconcussive syndrome 2017    SOB (shortness of breath)     Swelling     Weakness        Past Surgical History:   Procedure Laterality Date    DENTAL SURGERY      [place] COLONOSCOPY FLX DX W/COLLJ SPEC WHEN PFRMD N/A 2019    Procedure: COLONOSCOPY;  Surgeon: Chapo Robbins MD;  Location: [facility];  Service: Gastroenterology    AR ESOPHAGOGASTRODUODENOSCOPY TRANSORAL DIAGNOSTIC N/A 2019    Procedure: ESOPHAGOGASTRODUODENOSCOPY (EGD);  Surgeon: Chapo Robbins MD;  Location: [facility];  Service: Gastroenterology       Family History   Problem Relation Age of Onset    Hypertension Father     Stroke Father     Hyperlipidemia Father     Coronary artery disease Father     Hypertension Maternal Grandfather     Coronary artery disease Maternal Grandfather     Cancer Paternal Grandfather     Hyperlipidemia Paternal Grandfather     Coronary artery disease Paternal Grandfather     Hypertension Paternal Uncle     Stroke Paternal Uncle     Substance Abuse Neg Hx     Mental illness Neg Hx        Social History     Occupational History    Not on file   Tobacco Use    Smoking status: Former     Current packs/day: 0.00     Average packs/day: 1 pack/day for 10.0 years (10.0 ttl pk-yrs)     Types: Cigars, Cigarettes     Start date:      Quit date: 2017     Years since quittin.8    Smokeless tobacco: Never    Tobacco comments:     Quit 2017   Vaping Use    Vaping status: Never Used   Substance and Sexual Activity    Alcohol use: Yes      Comment: social    Drug use: No    Sexual activity: Not Currently         Current Outpatient Medications:     methylPREDNISolone 4 MG tablet therapy pack, Use as directed on package (Patient not taking: Reported on 10/18/2024), Disp: 1 each, Rfl: 0    multivitamin (THERAGRAN) TABS, Take 1 tablet by mouth daily, Disp: , Rfl:     naproxen (Naprosyn) 500 mg tablet, Take 1 tablet (500 mg total) by mouth 2 (two) times a day with meals for 7 days, Disp: 14 tablet, Rfl: 0    No Known Allergies    Objective:  There were no vitals filed for this visit.      The patient was awake, alert, and oriented to person, place, and time.  No acute distress.  Normocephalic.  EOMI.  Mucous membranes moist.  Normal respiratory effort.    Examination of the affected extremity was compared to the unaffected extremity.  Skin was intact.  No swelling or ecchymosis.  No deformity.  Hand and fingers were warm and well-perfused.  Capillary refill was brisk.  Full active range of motion of the elbows, forearms, wrists, and fingers.  5/5 elbow flexors/extensors, wrist flexor/extensors, and .     No pain or break in strength with wrist extension  TTP common extensor tendon origin  TTP along radial tunnel    Imaging/Diagnostic Studies:    No new images studies.      This document was created using speech voice recognition software.   Grammatical errors, random word insertions, pronoun errors, and incomplete sentences are an occasional consequence of this system due to software limitations, ambient noise, and hardware issues.   Any formal questions or concerns about content, text, or information contained within the body of this dictation should be directly addressed to the provider for clarification.       Scribe Attestation      I,:  Licha Butler am acting as a scribe while in the presence of the attending physician.:       I,:  Desirae Cifuentes MD personally performed the services described in this documentation    as scribed in my  presence.:

## 2024-11-26 ENCOUNTER — TELEPHONE (OUTPATIENT)
Dept: OBGYN CLINIC | Facility: CLINIC | Age: 29
End: 2024-11-26

## 2024-11-26 NOTE — TELEPHONE ENCOUNTER
Received incoming fax from [facility] Requesting form to be filled out and faxed to [phone].  Faxed information received confirmation. (See Attached)

## 2025-02-19 ENCOUNTER — OFFICE VISIT (OUTPATIENT)
Dept: FAMILY MEDICINE CLINIC | Facility: HOSPITAL | Age: 30
End: 2025-02-19
Payer: COMMERCIAL

## 2025-02-19 VITALS
DIASTOLIC BLOOD PRESSURE: 70 MMHG | BODY MASS INDEX: 26.52 KG/M2 | OXYGEN SATURATION: 98 % | WEIGHT: 165 LBS | HEIGHT: 66 IN | HEART RATE: 82 BPM | SYSTOLIC BLOOD PRESSURE: 120 MMHG

## 2025-02-19 DIAGNOSIS — M54.2 NECK PAIN, CHRONIC: Primary | ICD-10-CM

## 2025-02-19 DIAGNOSIS — E78.5 DYSLIPIDEMIA (HIGH LDL; LOW HDL): ICD-10-CM

## 2025-02-19 DIAGNOSIS — G44.86 CERVICOGENIC HEADACHE: ICD-10-CM

## 2025-02-19 DIAGNOSIS — M79.18 MYOFASCIAL MUSCLE PAIN: ICD-10-CM

## 2025-02-19 DIAGNOSIS — G89.29 NECK PAIN, CHRONIC: Primary | ICD-10-CM

## 2025-02-19 DIAGNOSIS — Z82.49 FAMILY HISTORY OF CARDIAC DISORDER IN FATHER: ICD-10-CM

## 2025-02-19 PROCEDURE — 99214 OFFICE O/P EST MOD 30 MIN: CPT | Performed by: INTERNAL MEDICINE

## 2025-02-19 NOTE — ASSESSMENT & PLAN NOTE
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 Does get to sit at times.   Ongoing pain and stiffness- at times feels that he is slowly getting worse with  popping and cracking sounds in neck. Seen in June  by Dr. Toure- has pain radiates from mid neck and then into head.   He was given a steroid at that time but no real improvement

## 2025-02-19 NOTE — PROGRESS NOTES
Assessment/Plan:     Diagnosis ICD-10-CM Associated Orders   1. Neck pain, chronic  M54.2 MRI cervical spine wo contrast    G89.29 Ambulatory referral to Spine & Pain Management      2. Myofascial muscle pain  M79.18 MRI cervical spine wo contrast     Ambulatory referral to Spine & Pain Management     Comprehensive metabolic panel     CBC and differential     Vitamin D 25 hydroxy      3. Cervicogenic headache  G44.86 MRI cervical spine wo contrast     Ambulatory referral to Spine & Pain Management     Vitamin D 25 hydroxy     Vitamin B12      4. Dyslipidemia (high LDL; low HDL)  E78.5 Lipid Panel with Direct LDL reflex      5. 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 Does get to sit at times.   Ongoing pain and stiffness- at times feels that he is slowly getting worse with  popping and cracking sounds in neck. Seen in June  by Dr. Toure- has pain radiates from mid neck and then into head.   He was given a steroid at that time but no real improvement         Relevant Orders    MRI cervical spine wo contrast    Ambulatory referral to Spine & Pain Management    Comprehensive metabolic panel    CBC and differential    Vitamin D 25 hydroxy    Neck pain, chronic - Primary    Relevant Orders    MRI cervical spine wo contrast    Ambulatory referral to Spine & Pain Management   Other Visit Diagnoses       Cervicogenic headache        Relevant Orders    MRI cervical spine wo contrast    Ambulatory referral to Spine & Pain Management    Vitamin D 25 hydroxy    Vitamin B12      Dyslipidemia (high LDL; low HDL)        Relevant Orders    Lipid Panel with Direct LDL reflex      Family history of cardiac disorder in father        Relevant  "Orders    TSH, 3rd generation with Free T4 reflex    Vitamin B12            No follow-ups on file.      Subjective:    Patient ID: Alejandro Foster Jr. is a 29 y.o. male    Had seen Dr. Sr years ago with some nerve blocks with pain management.    Had mri in 2018  Feels issues are becoming more frequent - has episodes  weekly now- was more severe  2 days ago with  constant issues- felt like he had ht top of head with headache symptoms all day- tolerable today   Had some triggers with cold weather now that we are experiencing    Neck Pain   This is a chronic problem. Pertinent negatives include no chest pain or fever.       The following portions of the patient's history were reviewed and updated as appropriate: allergies, current medications and problem list.     Review of Systems   Constitutional:  Negative for chills, fatigue and fever.   HENT:  Negative for congestion.    Respiratory:  Negative for shortness of breath.    Cardiovascular:  Negative for chest pain and palpitations.        Father had atrial fib   Patient is improved with palpitations since changing to decaf and avoiding excess etoh   Gastrointestinal:  Positive for nausea.        Occasional nausea with headaches   Musculoskeletal:  Positive for neck pain. Negative for arthralgias.   Skin:  Negative for rash.   All other systems reviewed and are negative.        Objective:      Current Outpatient Medications:   •  multivitamin (THERAGRAN) TABS, Take 1 tablet by mouth daily, Disp: , Rfl:     Blood pressure 120/70, pulse 82, height 5' 6\" (1.676 m), weight 74.8 kg (165 lb), SpO2 98%.     Physical Exam  Vitals and nursing note reviewed.   Constitutional:       General: He is not in acute distress.  HENT:      Head: Normocephalic.      Right Ear: Tympanic membrane normal. There is no impacted cerumen.      Left Ear: Tympanic membrane normal. There is no impacted cerumen.      Nose: No congestion.      Mouth/Throat:      Pharynx: No posterior " oropharyngeal erythema.   Eyes:      General:         Right eye: No discharge.         Left eye: No discharge.   Neck:      Comments: Ome tightness with sidebending more to right- rotation slighlty restricted  Cardiovascular:      Rate and Rhythm: Normal rate.      Heart sounds: No murmur heard.  Pulmonary:      Breath sounds: No wheezing.   Abdominal:      Palpations: Abdomen is soft.   Musculoskeletal:         General: Tenderness present.      Cervical back: Rigidity present.      Right lower leg: No edema.      Left lower leg: No edema.      Comments: Limited sidebending on right and rotation is  restricted to left  - some trigger point at occipital base   Skin:     Findings: No erythema.   Neurological:      Mental Status: He is alert.   Psychiatric:         Mood and Affect: Mood normal.         Thought Content: Thought content normal.         Judgment: Judgment normal.

## 2025-03-01 LAB
25(OH)D3+25(OH)D2 SERPL-MCNC: 25.5 NG/ML (ref 30–100)
ALBUMIN SERPL-MCNC: 4.6 G/DL (ref 4.3–5.2)
ALP SERPL-CCNC: 54 IU/L (ref 44–121)
ALT SERPL-CCNC: 18 IU/L (ref 0–44)
AST SERPL-CCNC: 22 IU/L (ref 0–40)
BASOPHILS # BLD AUTO: 0 X10E3/UL (ref 0–0.2)
BASOPHILS NFR BLD AUTO: 0 %
BILIRUB SERPL-MCNC: 0.3 MG/DL (ref 0–1.2)
BUN SERPL-MCNC: 16 MG/DL (ref 6–20)
BUN/CREAT SERPL: 17 (ref 9–20)
CALCIUM SERPL-MCNC: 9.2 MG/DL (ref 8.7–10.2)
CHLORIDE SERPL-SCNC: 105 MMOL/L (ref 96–106)
CHOLEST SERPL-MCNC: 189 MG/DL (ref 100–199)
CO2 SERPL-SCNC: 23 MMOL/L (ref 20–29)
CREAT SERPL-MCNC: 0.96 MG/DL (ref 0.76–1.27)
EGFR: 109 ML/MIN/1.73
EOSINOPHIL # BLD AUTO: 0 X10E3/UL (ref 0–0.4)
EOSINOPHIL NFR BLD AUTO: 1 %
ERYTHROCYTE [DISTWIDTH] IN BLOOD BY AUTOMATED COUNT: 12.5 % (ref 11.6–15.4)
GLOBULIN SER-MCNC: 2.3 G/DL (ref 1.5–4.5)
GLUCOSE SERPL-MCNC: 85 MG/DL (ref 70–99)
HCT VFR BLD AUTO: 45.9 % (ref 37.5–51)
HDLC SERPL-MCNC: 52 MG/DL
HGB BLD-MCNC: 15.3 G/DL (ref 13–17.7)
IMM GRANULOCYTES # BLD: 0 X10E3/UL (ref 0–0.1)
IMM GRANULOCYTES NFR BLD: 0 %
LDLC SERPL CALC-MCNC: 127 MG/DL (ref 0–99)
LYMPHOCYTES # BLD AUTO: 1.7 X10E3/UL (ref 0.7–3.1)
LYMPHOCYTES NFR BLD AUTO: 32 %
MCH RBC QN AUTO: 30.8 PG (ref 26.6–33)
MCHC RBC AUTO-ENTMCNC: 33.3 G/DL (ref 31.5–35.7)
MCV RBC AUTO: 92 FL (ref 79–97)
MONOCYTES # BLD AUTO: 0.4 X10E3/UL (ref 0.1–0.9)
MONOCYTES NFR BLD AUTO: 7 %
NEUTROPHILS # BLD AUTO: 3.1 X10E3/UL (ref 1.4–7)
NEUTROPHILS NFR BLD AUTO: 60 %
PLATELET # BLD AUTO: 203 X10E3/UL (ref 150–450)
POTASSIUM SERPL-SCNC: 4.3 MMOL/L (ref 3.5–5.2)
PROT SERPL-MCNC: 6.9 G/DL (ref 6–8.5)
RBC # BLD AUTO: 4.97 X10E6/UL (ref 4.14–5.8)
SL AMB VLDL CHOLESTEROL CALC: 10 MG/DL (ref 5–40)
SODIUM SERPL-SCNC: 144 MMOL/L (ref 134–144)
TRIGL SERPL-MCNC: 50 MG/DL (ref 0–149)
TSH SERPL DL<=0.005 MIU/L-ACNC: 1.34 UIU/ML (ref 0.45–4.5)
VIT B12 SERPL-MCNC: 528 PG/ML (ref 232–1245)
WBC # BLD AUTO: 5.2 X10E3/UL (ref 3.4–10.8)

## 2025-03-06 ENCOUNTER — RESULTS FOLLOW-UP (OUTPATIENT)
Dept: FAMILY MEDICINE CLINIC | Facility: HOSPITAL | Age: 30
End: 2025-03-06

## 2025-03-10 ENCOUNTER — PATIENT MESSAGE (OUTPATIENT)
Dept: FAMILY MEDICINE CLINIC | Facility: HOSPITAL | Age: 30
End: 2025-03-10

## 2025-03-11 NOTE — PATIENT COMMUNICATION
Patient called back to state that he does have an upcoming appointment with pain management department    Thank you